# Patient Record
Sex: MALE | Race: WHITE | NOT HISPANIC OR LATINO | Employment: OTHER | URBAN - METROPOLITAN AREA
[De-identification: names, ages, dates, MRNs, and addresses within clinical notes are randomized per-mention and may not be internally consistent; named-entity substitution may affect disease eponyms.]

---

## 2017-01-05 ENCOUNTER — HOSPITAL ENCOUNTER (OUTPATIENT)
Dept: CARDIOLOGY | Facility: MEDICAL CENTER | Age: 72
End: 2017-01-05
Attending: INTERNAL MEDICINE
Payer: MEDICARE

## 2017-01-05 DIAGNOSIS — I48.4 ATYPICAL ATRIAL FLUTTER (HCC): ICD-10-CM

## 2017-01-05 LAB
LV EJECT FRACT  99904: 65
LV EJECT FRACT MOD 2C 99903: 55.7
LV EJECT FRACT MOD 4C 99902: 55.39
LV EJECT FRACT MOD BP 99901: 54.95

## 2017-01-05 PROCEDURE — 93306 TTE W/DOPPLER COMPLETE: CPT

## 2017-01-06 ENCOUNTER — TELEPHONE (OUTPATIENT)
Dept: CARDIOLOGY | Facility: MEDICAL CENTER | Age: 72
End: 2017-01-06

## 2017-01-07 NOTE — TELEPHONE ENCOUNTER
----- Message from Bere Larios M.D. sent at 1/6/2017  7:46 AM PST -----  Great heart function, only mild mitral leak - no concerns at all  F/u as planned

## 2017-02-09 ENCOUNTER — OFFICE VISIT (OUTPATIENT)
Dept: CARDIOLOGY | Facility: MEDICAL CENTER | Age: 72
End: 2017-02-09
Payer: MEDICARE

## 2017-02-09 VITALS
BODY MASS INDEX: 27.4 KG/M2 | SYSTOLIC BLOOD PRESSURE: 110 MMHG | HEART RATE: 78 BPM | HEIGHT: 69 IN | OXYGEN SATURATION: 94 % | DIASTOLIC BLOOD PRESSURE: 70 MMHG | WEIGHT: 185 LBS

## 2017-02-09 DIAGNOSIS — I48.4 ATYPICAL ATRIAL FLUTTER (HCC): ICD-10-CM

## 2017-02-09 PROCEDURE — G8420 CALC BMI NORM PARAMETERS: HCPCS | Performed by: INTERNAL MEDICINE

## 2017-02-09 PROCEDURE — 1101F PT FALLS ASSESS-DOCD LE1/YR: CPT | Mod: 8P | Performed by: INTERNAL MEDICINE

## 2017-02-09 PROCEDURE — G8482 FLU IMMUNIZE ORDER/ADMIN: HCPCS | Performed by: INTERNAL MEDICINE

## 2017-02-09 PROCEDURE — 3017F COLORECTAL CA SCREEN DOC REV: CPT | Mod: 8P | Performed by: INTERNAL MEDICINE

## 2017-02-09 PROCEDURE — 4040F PNEUMOC VAC/ADMIN/RCVD: CPT | Mod: 8P | Performed by: INTERNAL MEDICINE

## 2017-02-09 PROCEDURE — 1036F TOBACCO NON-USER: CPT | Performed by: INTERNAL MEDICINE

## 2017-02-09 PROCEDURE — 99214 OFFICE O/P EST MOD 30 MIN: CPT | Performed by: INTERNAL MEDICINE

## 2017-02-09 PROCEDURE — G8432 DEP SCR NOT DOC, RNG: HCPCS | Performed by: INTERNAL MEDICINE

## 2017-02-09 ASSESSMENT — ENCOUNTER SYMPTOMS
INSOMNIA: 0
DIZZINESS: 0
WEIGHT LOSS: 0
COUGH: 0
PALPITATIONS: 0
BRUISES/BLEEDS EASILY: 0
SHORTNESS OF BREATH: 0
GASTROINTESTINAL NEGATIVE: 1
EYES NEGATIVE: 1
LOSS OF CONSCIOUSNESS: 0
ABDOMINAL PAIN: 0

## 2017-02-09 NOTE — PROGRESS NOTES
Subjective:   Hardy Roca is a 71 y.o. male who presents today in follow-up in regards to his paroxysmal atrial flutter    Doing extremely well, golfing. No limitations and no rapid heart rates or flutters. Compliant with his medications which include aspirin    Past Medical History   Diagnosis Date   • Unspecified urinary incontinence      prostate enlargement , per patient   • CATARACT 2006, 2007     both done, one at a time   • BPH (benign prostatic hypertrophy)    • Atrial flutter (CMS-HCC)    • HLD (hyperlipidemia)      Past Surgical History   Procedure Laterality Date   • Cataract extraction with iol       bilateral   • Inguinal hernia repair  10/28/2009     Performed by ANGELI CHOI at SURGERY [facility] ORS   • Other  1982     tonsillectomy   • Hernia repair  2009   • Appendectomy     • Recovery  11/23/2015     Procedure: CATH LAB-CARDIOVERSION-KOZTOWSKI-ANESTHESIA-ICD 10: I48.91;  Surgeon: Recoveryonly Surgery;  Location: SURGERY PRE-POST PROC UNIT [facility] [place];  Service:      Family History   Problem Relation Age of Onset   • Heart Disease Neg Hx    • Heart Failure Neg Hx      History   Smoking status   • Never Smoker    Smokeless tobacco   • Never Used     No Known Allergies  Outpatient Encounter Prescriptions as of 2/9/2017   Medication Sig Dispense Refill   • desmopressin (DDAVP) 0.2 MG tablet every evening.     • terazosin (HYTRIN) 5 MG Cap every evening.     • simvastatin (ZOCOR) 20 MG Tab Take 1 Tab by mouth every evening. 30 Tab 11   • [DISCONTINUED] diltiazem (CARDIZEM) 30 MG Tab Take 1 Tab by mouth 2 Times a Day. 180 Tab 3     No facility-administered encounter medications on file as of 2/9/2017.     Review of Systems   Constitutional: Negative for weight loss and malaise/fatigue.   HENT: Negative for congestion.    Eyes: Negative.    Respiratory: Negative for cough and shortness of breath.    Cardiovascular: Negative for chest pain, palpitations and leg swelling.   Gastrointestinal: Negative.   "Negative for abdominal pain.   Genitourinary: Negative for dysuria.   Neurological: Negative for dizziness and loss of consciousness.   Endo/Heme/Allergies: Does not bruise/bleed easily.   Psychiatric/Behavioral: The patient does not have insomnia.    All other systems reviewed and are negative.       Objective:   /70 mmHg  Pulse 78  Ht 1.753 m (5' 9.02\")  Wt 83.915 kg (185 lb)  BMI 27.31 kg/m2  SpO2 94%    Physical Exam   Constitutional: He is oriented to person, place, and time. He appears well-developed.   HENT:   Mouth/Throat: Mucous membranes are normal.   Eyes: Conjunctivae and EOM are normal.   Neck: No JVD present. No thyroid mass present.   Cardiovascular: Normal rate, regular rhythm and intact distal pulses.    No murmur heard.  Pulmonary/Chest: Effort normal and breath sounds normal.   Musculoskeletal: Normal range of motion. He exhibits no edema.   Neurological: He is alert and oriented to person, place, and time. He has normal strength. He displays no tremor.   Skin: Skin is warm.   Psychiatric: He has a normal mood and affect. His behavior is normal.   Vitals reviewed.      Assessment:     1. Atypical atrial flutter (CMS-HCC)         Medical Decision Making:  Today's Assessment / Status / Plan:     Paroxysmal atrial flutter 2015. Symptomatic. Resolved    Atrial flutter 2015 at the same time due to rapid rates. We will review the echocardiogram images from last month. Normal and reassuring, mild mitral regurgitation. We will repeat in 1-2 years. Guidelines    Stent more than 20 minutes reviewing the chart and talking about his condition which is actually quite stable and in remission    Aspirin per guidelines, RTC one year  "

## 2017-02-09 NOTE — MR AVS SNAPSHOT
"        Hardy Roca   2017 7:45 AM   Office Visit   MRN: [id number]    Department:  [facility]   Dept Phone:  [phone]    Description:  Male : 1945   Provider:  Bere Larios M.D.           Reason for Visit     Follow-Up           Allergies as of 2017     No Known Allergies      You were diagnosed with     Atypical atrial flutter (CMS-HCC)   [023828]         Vital Signs     Blood Pressure Pulse Height Weight Body Mass Index Oxygen Saturation    110/70 mmHg 78 1.753 m (5' 9.02\") 83.915 kg (185 lb) 27.31 kg/m2 94%    Smoking Status                   Never Smoker            Basic Information     Date Of Birth Sex Race Ethnicity Preferred Language    1945 Male White Non- English      Problem List              ICD-10-CM Priority Class Noted - Resolved    Atrial flutter (CMS-HCC) I48.92 High  10/14/2015 - Present    Hyperlipidemia E78.5 High  10/26/2015 - Present    BPH (benign prostatic hypertrophy) N40.0 Low  10/26/2015 - Present      Health Maintenance        Date Due Completion Dates    IMM DTaP/Tdap/Td Vaccine (1 - Tdap) 11/15/1963 ---    COLONOSCOPY 11/15/1994 ---    IMM ZOSTER VACCINE 11/15/2004 ---    IMM PNEUMOCOCCAL 65+ (ADULT) LOW/MEDIUM RISK SERIES (1 of 2 - PCV13) 11/15/2009 ---            Current Immunizations     Influenza Vaccine Adult HD 10/11/2016  9:03 AM, 2015  9:22 AM      Below and/or attached are the medications your provider expects you to take. Review all of your home medications and newly ordered medications with your provider and/or pharmacist. Follow medication instructions as directed by your provider and/or pharmacist. Please keep your medication list with you and share with your provider. Update the information when medications are discontinued, doses are changed, or new medications (including over-the-counter products) are added; and carry medication information at all times in the event of emergency situations     Allergies:  No Known " Allergies          Medications  Valid as of: February 09, 2017 -  8:08 AM    Generic Name Brand Name Tablet Size Instructions for use    Desmopressin Acetate (Tab) DDAVP 0.2 MG every evening.        Simvastatin (Tab) ZOCOR 20 MG Take 1 Tab by mouth every evening.        Terazosin HCl (Cap) HYTRIN 5 MG every evening.        .                 Medicines prescribed today were sent to:     [facility] [address] [facility] [place] - [address] [facility]    [address] [facility] [place] [address]    Phone: [phone] Fax: [phone]    Open 24 Hours?: No      Medication refill instructions:       If your prescription bottle indicates you have medication refills left, it is not necessary to call your provider’s office. Please contact your pharmacy and they will refill your medication.    If your prescription bottle indicates you do not have any refills left, you may request refills at any time through one of the following ways: The online [employer] system (except Urgent Care), by calling your provider’s office, or by asking your pharmacy to contact your provider’s office with a refill request. Medication refills are processed only during regular business hours and may not be available until the next business day. Your provider may request additional information or to have a follow-up visit with you prior to refilling your medication.   *Please Note: Medication refills are assigned a new Rx number when refilled electronically. Your pharmacy may indicate that no refills were authorized even though a new prescription for the same medication is available at the pharmacy. Please request the medicine by name with the pharmacy before contacting your provider for a refill.           [employer] Access Code: [email or web address]  Expires: 3/11/2017  8:08 AM    [employer]  A secure, online tool to manage your health information     [employer]® is a secure, online tool that connects you to your personalized health information from the privacy of your  home -- day or night - making it very easy for you to manage your healthcare. Once the activation process is completed, you can even access your medical information using the [employer] pastora, which is available for free in the Apple Pastora store or Google Play store.     [employer] provides the following levels of access (as shown below):   My Chart Features   [facility] Primary [facility] Doctor [facility]  Specialists [facility]  Urgent  Care Non-[facility]  Primary Care  Doctor   Email your healthcare team securely and privately 24/7 X X X    Manage appointments: schedule your next appointment; view details of past/upcoming appointments X      Request prescription refills. X      View recent personal medical records, including lab and immunizations X X X X   View health record, including health history, allergies, medications X X X X   Read reports about your outpatient visits, procedures, consult and ER notes X X X X   See your discharge summary, which is a recap of your hospital and/or ER visit that includes your diagnosis, lab results, and care plan. X X       How to register for [employer]:  1. Go to  [email or web address].  2. Click on the Sign Up Now box, which takes you to the New Member Sign Up page. You will need to provide the following information:  a. Enter your [employer] Access Code exactly as it appears at the top of this page. (You will not need to use this code after you’ve completed the sign-up process. If you do not sign up before the expiration date, you must request a new code.)   b. Enter your date of birth.   c. Enter your home email address.   d. Click Submit, and follow the next screen’s instructions.  3. Create a [employer] ID. This will be your [employer] login ID and cannot be changed, so think of one that is secure and easy to remember.  4. Create a [employer] password. You can change your password at any time.  5. Enter your Password Reset Question and Answer. This can be used at a later time if you forget your password.    6. Enter your e-mail address. This allows you to receive e-mail notifications when new information is available in [employer].  7. Click Sign Up. You can now view your health information.    For assistance activating your [employer] account, call [phone]

## 2017-02-09 NOTE — Clinical Note
[facility]   [address]  [place] [address]  Phone: [phone]  Fax: [phone]              Hardy Roca  1945    Encounter Date: 2/9/2017    Bere Larios M.D.          PROGRESS NOTE:  Subjective:   Hardy Roca is a 71 y.o. male who presents today in follow-up in regards to his paroxysmal atrial flutter    Doing extremely well, golfing. No limitations and no rapid heart rates or flutters. Compliant with his medications which include aspirin    Past Medical History   Diagnosis Date   • Unspecified urinary incontinence      prostate enlargement , per patient   • CATARACT 2006, 2007     both done, one at a time   • BPH (benign prostatic hypertrophy)    • Atrial flutter (CMS-HCC)    • HLD (hyperlipidemia)      Past Surgical History   Procedure Laterality Date   • Cataract extraction with iol       bilateral   • Inguinal hernia repair  10/28/2009     Performed by ANGELI CHOI at SURGERY [facility] ORS   • Other  1982     tonsillectomy   • Hernia repair  2009   • Appendectomy     • Recovery  11/23/2015     Procedure: CATH LAB-CARDIOVERSION-KOZTOWSKI-ANESTHESIA-ICD 10: I48.91;  Surgeon: Recoveryonly Surgery;  [place] [facility] [place]  Service:      Family History   Problem Relation Age of Onset   • Heart Disease Neg Hx    • Heart Failure Neg Hx      History   Smoking status   • Never Smoker    Smokeless tobacco   • Never Used     No Known Allergies  Outpatient Encounter Prescriptions as of 2/9/2017   Medication Sig Dispense Refill   • desmopressin (DDAVP) 0.2 MG tablet every evening.     • terazosin (HYTRIN) 5 MG Cap every evening.     • simvastatin (ZOCOR) 20 MG Tab Take 1 Tab by mouth every evening. 30 Tab 11   • [DISCONTINUED] diltiazem (CARDIZEM) 30 MG Tab Take 1 Tab by mouth 2 Times a Day. 180 Tab 3     No facility-administered encounter medications on file as of 2/9/2017.     Review of Systems   Constitutional:  "Negative for weight loss and malaise/fatigue.   HENT: Negative for congestion.    Eyes: Negative.    Respiratory: Negative for cough and shortness of breath.    Cardiovascular: Negative for chest pain, palpitations and leg swelling.   Gastrointestinal: Negative.  Negative for abdominal pain.   Genitourinary: Negative for dysuria.   Neurological: Negative for dizziness and loss of consciousness.   Endo/Heme/Allergies: Does not bruise/bleed easily.   Psychiatric/Behavioral: The patient does not have insomnia.    All other systems reviewed and are negative.       Objective:   /70 mmHg  Pulse 78  Ht 1.753 m (5' 9.02\")  Wt 83.915 kg (185 lb)  BMI 27.31 kg/m2  SpO2 94%    Physical Exam   Constitutional: He is oriented to person, place, and time. He appears well-developed.   HENT:   Mouth/Throat: Mucous membranes are normal.   Eyes: Conjunctivae and EOM are normal.   Neck: No JVD present. No thyroid mass present.   Cardiovascular: Normal rate, regular rhythm and intact distal pulses.    No murmur heard.  Pulmonary/Chest: Effort normal and breath sounds normal.   Musculoskeletal: Normal range of motion. He exhibits no edema.   Neurological: He is alert and oriented to person, place, and time. He has normal strength. He displays no tremor.   Skin: Skin is warm.   Psychiatric: He has a normal mood and affect. His behavior is normal.   Vitals reviewed.      Assessment:     1. Atypical atrial flutter (CMS-[facility])         Medical Decision Making:  Today's Assessment / Status / Plan:     Paroxysmal atrial flutter 2015. Symptomatic. Resolved    Atrial flutter 2015 at the same time due to rapid rates. We will review the echocardiogram images from last month. Normal and reassuring, mild mitral regurgitation. We will repeat in 1-2 years. Guidelines    Stent more than 20 minutes reviewing the chart and talking about his condition which is actually quite stable and in remission    Aspirin per guidelines, RTC one year      Long " ARLEY Eddy D.O.  [address] [address]  VIA Facsimile: [phone]

## 2017-05-23 ENCOUNTER — HOSPITAL ENCOUNTER (OUTPATIENT)
Dept: LAB | Facility: MEDICAL CENTER | Age: 72
End: 2017-05-23
Attending: FAMILY MEDICINE
Payer: MEDICARE

## 2017-05-23 LAB
ALBUMIN SERPL BCP-MCNC: 4.1 G/DL (ref 3.2–4.9)
ALBUMIN/GLOB SERPL: 1.7 G/DL
ALP SERPL-CCNC: 30 U/L (ref 30–99)
ALT SERPL-CCNC: 12 U/L (ref 2–50)
ANION GAP SERPL CALC-SCNC: 7 MMOL/L (ref 0–11.9)
AST SERPL-CCNC: 16 U/L (ref 12–45)
BILIRUB SERPL-MCNC: 1.2 MG/DL (ref 0.1–1.5)
BUN SERPL-MCNC: 24 MG/DL (ref 8–22)
CALCIUM SERPL-MCNC: 9.1 MG/DL (ref 8.5–10.5)
CHLORIDE SERPL-SCNC: 107 MMOL/L (ref 96–112)
CHOLEST SERPL-MCNC: 106 MG/DL (ref 100–199)
CO2 SERPL-SCNC: 24 MMOL/L (ref 20–33)
CREAT SERPL-MCNC: 1.14 MG/DL (ref 0.5–1.4)
GFR SERPL CREATININE-BSD FRML MDRD: >60 ML/MIN/1.73 M 2
GLOBULIN SER CALC-MCNC: 2.4 G/DL (ref 1.9–3.5)
GLUCOSE SERPL-MCNC: 92 MG/DL (ref 65–99)
HDLC SERPL-MCNC: 34 MG/DL
LDLC SERPL CALC-MCNC: 51 MG/DL
POTASSIUM SERPL-SCNC: 4.1 MMOL/L (ref 3.6–5.5)
PROT SERPL-MCNC: 6.5 G/DL (ref 6–8.2)
SODIUM SERPL-SCNC: 138 MMOL/L (ref 135–145)
TRIGL SERPL-MCNC: 106 MG/DL (ref 0–149)

## 2017-05-23 PROCEDURE — 36415 COLL VENOUS BLD VENIPUNCTURE: CPT

## 2017-05-23 PROCEDURE — 80061 LIPID PANEL: CPT

## 2017-05-23 PROCEDURE — 80053 COMPREHEN METABOLIC PANEL: CPT

## 2017-11-14 ENCOUNTER — APPOINTMENT (OUTPATIENT)
Dept: SOCIAL WORK | Facility: CLINIC | Age: 72
End: 2017-11-14
Payer: MEDICARE

## 2017-11-14 PROCEDURE — G0008 ADMIN INFLUENZA VIRUS VAC: HCPCS | Performed by: REGISTERED NURSE

## 2017-11-14 PROCEDURE — 90662 IIV NO PRSV INCREASED AG IM: CPT | Performed by: REGISTERED NURSE

## 2017-11-21 ENCOUNTER — HOSPITAL ENCOUNTER (OUTPATIENT)
Dept: LAB | Facility: MEDICAL CENTER | Age: 72
End: 2017-11-21
Attending: FAMILY MEDICINE
Payer: MEDICARE

## 2017-11-21 LAB
ALBUMIN SERPL BCP-MCNC: 4 G/DL (ref 3.2–4.9)
ALBUMIN/GLOB SERPL: 1.5 G/DL
ALP SERPL-CCNC: 31 U/L (ref 30–99)
ALT SERPL-CCNC: 15 U/L (ref 2–50)
ANION GAP SERPL CALC-SCNC: 6 MMOL/L (ref 0–11.9)
AST SERPL-CCNC: 18 U/L (ref 12–45)
BILIRUB SERPL-MCNC: 1.1 MG/DL (ref 0.1–1.5)
BUN SERPL-MCNC: 24 MG/DL (ref 8–22)
CALCIUM SERPL-MCNC: 9.4 MG/DL (ref 8.5–10.5)
CHLORIDE SERPL-SCNC: 109 MMOL/L (ref 96–112)
CHOLEST SERPL-MCNC: 110 MG/DL (ref 100–199)
CO2 SERPL-SCNC: 25 MMOL/L (ref 20–33)
CREAT SERPL-MCNC: 1.18 MG/DL (ref 0.5–1.4)
GFR SERPL CREATININE-BSD FRML MDRD: >60 ML/MIN/1.73 M 2
GLOBULIN SER CALC-MCNC: 2.6 G/DL (ref 1.9–3.5)
GLUCOSE SERPL-MCNC: 92 MG/DL (ref 65–99)
HDLC SERPL-MCNC: 32 MG/DL
LDLC SERPL CALC-MCNC: 55 MG/DL
POTASSIUM SERPL-SCNC: 4.1 MMOL/L (ref 3.6–5.5)
PROT SERPL-MCNC: 6.6 G/DL (ref 6–8.2)
SODIUM SERPL-SCNC: 140 MMOL/L (ref 135–145)
TRIGL SERPL-MCNC: 114 MG/DL (ref 0–149)

## 2017-11-21 PROCEDURE — 36415 COLL VENOUS BLD VENIPUNCTURE: CPT

## 2017-11-21 PROCEDURE — 80061 LIPID PANEL: CPT

## 2017-11-21 PROCEDURE — 80053 COMPREHEN METABOLIC PANEL: CPT

## 2018-02-05 ENCOUNTER — HOSPITAL ENCOUNTER (OUTPATIENT)
Dept: LAB | Facility: MEDICAL CENTER | Age: 73
End: 2018-02-05
Attending: UROLOGY
Payer: MEDICARE

## 2018-02-05 LAB — PSA SERPL-MCNC: 7.38 NG/ML (ref 0–4)

## 2018-02-05 PROCEDURE — 84153 ASSAY OF PSA TOTAL: CPT

## 2018-02-05 PROCEDURE — 36415 COLL VENOUS BLD VENIPUNCTURE: CPT

## 2018-02-14 ENCOUNTER — OFFICE VISIT (OUTPATIENT)
Dept: CARDIOLOGY | Facility: MEDICAL CENTER | Age: 73
End: 2018-02-14
Payer: MEDICARE

## 2018-02-14 VITALS
OXYGEN SATURATION: 93 % | SYSTOLIC BLOOD PRESSURE: 110 MMHG | HEART RATE: 94 BPM | HEIGHT: 69 IN | BODY MASS INDEX: 26.66 KG/M2 | DIASTOLIC BLOOD PRESSURE: 70 MMHG | WEIGHT: 180 LBS

## 2018-02-14 DIAGNOSIS — I48.4 ATYPICAL ATRIAL FLUTTER (HCC): ICD-10-CM

## 2018-02-14 LAB — EKG IMPRESSION: NORMAL

## 2018-02-14 PROCEDURE — 99214 OFFICE O/P EST MOD 30 MIN: CPT | Performed by: INTERNAL MEDICINE

## 2018-02-14 PROCEDURE — 93000 ELECTROCARDIOGRAM COMPLETE: CPT | Performed by: INTERNAL MEDICINE

## 2018-02-14 ASSESSMENT — ENCOUNTER SYMPTOMS
COUGH: 0
GASTROINTESTINAL NEGATIVE: 1
SHORTNESS OF BREATH: 0
BRUISES/BLEEDS EASILY: 0
WEIGHT LOSS: 0
INSOMNIA: 0
DEPRESSION: 0
LOSS OF CONSCIOUSNESS: 0
PALPITATIONS: 0
ABDOMINAL PAIN: 0
EYES NEGATIVE: 1
DIZZINESS: 0

## 2018-02-14 NOTE — PROGRESS NOTES
Subjective:   Hardy Roca is a 72 y.o. male who presents today in follow-up in regards to his paroxysmal atrial flutter    Doing extremely well, golfing.   No limitations and no rapid heart rates or flutters.   Compliant with his medications which include aspirin - hasn't got sick, can walk 3-4 flights of stairs or a mile on the golf course without limiting symptoms    Past Medical History:   Diagnosis Date   • Atrial flutter (CMS-HCC)    • BPH (benign prostatic hypertrophy)    • CATARACT 2006, 2007    both done, one at a time   • HLD (hyperlipidemia)    • Unspecified urinary incontinence     prostate enlargement , per patient     Past Surgical History:   Procedure Laterality Date   • RECOVERY  11/23/2015    Procedure: CATH LAB-CARDIOVERSION-KOZTOWSKI-ANESTHESIA-ICD 10: I48.91;  Surgeon: Recoveryonly Surgery;  Location: SURGERY PRE-POST PROC UNIT [facility] [place];  Service:    • INGUINAL HERNIA REPAIR  10/28/2009    Performed by ANGELI CHOI at SURGERY [facility]   • HERNIA REPAIR  2009   • OTHER  1982    tonsillectomy   • APPENDECTOMY     • CATARACT EXTRACTION WITH IOL      bilateral     Family History   Problem Relation Age of Onset   • Heart Disease Neg Hx    • Heart Failure Neg Hx      History   Smoking Status   • Never Smoker   Smokeless Tobacco   • Never Used     No Known Allergies  Outpatient Encounter Prescriptions as of 2/14/2018   Medication Sig Dispense Refill   • aspirin EC (ECOTRIN) 81 MG Tablet Delayed Response Take 81 mg by mouth every day.     • desmopressin (DDAVP) 0.2 MG tablet every evening.     • terazosin (HYTRIN) 5 MG Cap every evening.     • simvastatin (ZOCOR) 20 MG Tab Take 1 Tab by mouth every evening. 30 Tab 11     No facility-administered encounter medications on file as of 2/14/2018.      Review of Systems   Constitutional: Negative for malaise/fatigue and weight loss.   HENT: Negative for congestion.    Eyes: Negative.    Respiratory: Negative for cough and shortness of breath.   "  Cardiovascular: Negative for chest pain, palpitations and leg swelling.   Gastrointestinal: Negative.  Negative for abdominal pain.   Genitourinary: Negative for dysuria.   Neurological: Negative for dizziness and loss of consciousness.   Endo/Heme/Allergies: Does not bruise/bleed easily.   Psychiatric/Behavioral: Negative for depression. The patient does not have insomnia.    All other systems reviewed and are negative.       Objective:   /70   Pulse 94   Ht 1.753 m (5' 9\")   Wt 81.6 kg (180 lb)   SpO2 93%   BMI 26.58 kg/m²     Physical Exam   Constitutional: He is oriented to person, place, and time. He appears well-developed and well-nourished.   HENT:   Head: Normocephalic and atraumatic.   Mouth/Throat: Mucous membranes are normal.   Eyes: EOM are normal. Pupils are equal, round, and reactive to light. No scleral icterus.   Neck: No JVD present. No thyroid mass and no thyromegaly present.   Cardiovascular: Normal rate, regular rhythm and intact distal pulses.    No murmur heard.  Pulmonary/Chest: Effort normal and breath sounds normal. He exhibits no tenderness.   Abdominal: Bowel sounds are normal.   Musculoskeletal: Normal range of motion. He exhibits no edema.   Neurological: He is alert and oriented to person, place, and time. He has normal strength. He displays no tremor. He exhibits normal muscle tone. Coordination normal.   Skin: Skin is warm. No rash noted.   Psychiatric: He has a normal mood and affect. His behavior is normal.   Vitals reviewed.      Assessment:     1. Atypical atrial flutter (CMS-[facility])  EKG       Medical Decision Making:  Today's Assessment / Status / Plan:     Paroxysmal atrial flutter 2015. Symptomatic. Resolved  Repeat EKG now  Aspirin   Echo last January 2017 normal outside mild mitral regurgitation, discussed. Repeat next year    Mitral regurgitation  Stable on last 2 echoes  Discussed physiology, discussed blood pressure and exercise  Echo as above, " reassurance    Reviewed labs, lipids excellent. He'll discuss lowering his statin with his PCP. LDL should be less than 100 and it is 55    RTC one year, questions answered  \

## 2018-02-14 NOTE — LETTER
[facility]   [address]  [place] [address]  Phone: [phone]  Fax: [phone]              Hardy Roca  1945    Encounter Date: 2/14/2018    Bere Larios M.D.          PROGRESS NOTE:  Subjective:   Hardy Roca is a 72 y.o. male who presents today in follow-up in regards to his paroxysmal atrial flutter    Doing extremely well, golfing.   No limitations and no rapid heart rates or flutters.   Compliant with his medications which include aspirin - hasn't got sick, can walk 3-4 flights of stairs or a mile on the golf course without limiting symptoms    Past Medical History:   Diagnosis Date   • Atrial flutter (CMS-HCC)    • BPH (benign prostatic hypertrophy)    • CATARACT 2006, 2007    both done, one at a time   • HLD (hyperlipidemia)    • Unspecified urinary incontinence     prostate enlargement , per patient     Past Surgical History:   Procedure Laterality Date   • RECOVERY  11/23/2015    Procedure: CATH LAB-CARDIOVERSION-KOZTOWSKI-ANESTHESIA-ICD 10: I48.91;  Surgeon: Recoveryonly Surgery;  [place] [facility] [place]  Service:    • INGUINAL HERNIA REPAIR  10/28/2009    Performed by ANGELI CHOI at SURGERY [facility] ORS   • HERNIA REPAIR  2009   • OTHER  1982    tonsillectomy   • APPENDECTOMY     • CATARACT EXTRACTION WITH IOL      bilateral     Family History   Problem Relation Age of Onset   • Heart Disease Neg Hx    • Heart Failure Neg Hx      History   Smoking Status   • Never Smoker   Smokeless Tobacco   • Never Used     No Known Allergies  Outpatient Encounter Prescriptions as of 2/14/2018   Medication Sig Dispense Refill   • aspirin EC (ECOTRIN) 81 MG Tablet Delayed Response Take 81 mg by mouth every day.     • desmopressin (DDAVP) 0.2 MG tablet every evening.     • terazosin (HYTRIN) 5 MG Cap every evening.     • simvastatin (ZOCOR) 20 MG Tab Take 1 Tab by mouth every evening. 30 Tab 11     No  "facility-administered encounter medications on file as of 2/14/2018.      Review of Systems   Constitutional: Negative for malaise/fatigue and weight loss.   HENT: Negative for congestion.    Eyes: Negative.    Respiratory: Negative for cough and shortness of breath.    Cardiovascular: Negative for chest pain, palpitations and leg swelling.   Gastrointestinal: Negative.  Negative for abdominal pain.   Genitourinary: Negative for dysuria.   Neurological: Negative for dizziness and loss of consciousness.   Endo/Heme/Allergies: Does not bruise/bleed easily.   Psychiatric/Behavioral: Negative for depression. The patient does not have insomnia.    All other systems reviewed and are negative.       Objective:   /70   Pulse 94   Ht 1.753 m (5' 9\")   Wt 81.6 kg (180 lb)   SpO2 93%   BMI 26.58 kg/m²      Physical Exam   Constitutional: He is oriented to person, place, and time. He appears well-developed and well-nourished.   HENT:   Head: Normocephalic and atraumatic.   Mouth/Throat: Mucous membranes are normal.   Eyes: EOM are normal. Pupils are equal, round, and reactive to light. No scleral icterus.   Neck: No JVD present. No thyroid mass and no thyromegaly present.   Cardiovascular: Normal rate, regular rhythm and intact distal pulses.    No murmur heard.  Pulmonary/Chest: Effort normal and breath sounds normal. He exhibits no tenderness.   Abdominal: Bowel sounds are normal.   Musculoskeletal: Normal range of motion. He exhibits no edema.   Neurological: He is alert and oriented to person, place, and time. He has normal strength. He displays no tremor. He exhibits normal muscle tone. Coordination normal.   Skin: Skin is warm. No rash noted.   Psychiatric: He has a normal mood and affect. His behavior is normal.   Vitals reviewed.      Assessment:     1. Atypical atrial flutter (CMS-[facility])  EKG       Medical Decision Making:  Today's Assessment / Status / Plan:     Paroxysmal atrial flutter 2015. Symptomatic. " Resolved  Repeat EKG now  Aspirin   Echo last January 2017 normal outside mild mitral regurgitation, discussed. Repeat next year    Mitral regurgitation  Stable on last 2 echoes  Discussed physiology, discussed blood pressure and exercise  Echo as above, reassurance    Reviewed labs, lipids excellent. He'll discuss lowering his statin with his PCP. LDL should be less than 100 and it is 55    RTC one year, questions answered  \      Long Eddy D.O.  [address] [address]  VIA Facsimile: [phone]

## 2018-06-07 ENCOUNTER — HOSPITAL ENCOUNTER (OUTPATIENT)
Dept: LAB | Facility: MEDICAL CENTER | Age: 73
End: 2018-06-07
Attending: FAMILY MEDICINE
Payer: MEDICARE

## 2018-06-07 LAB
ALBUMIN SERPL BCP-MCNC: 4.3 G/DL (ref 3.2–4.9)
ALBUMIN/GLOB SERPL: 1.7 G/DL
ALP SERPL-CCNC: 32 U/L (ref 30–99)
ALT SERPL-CCNC: 14 U/L (ref 2–50)
ANION GAP SERPL CALC-SCNC: 9 MMOL/L (ref 0–11.9)
AST SERPL-CCNC: 15 U/L (ref 12–45)
BILIRUB SERPL-MCNC: 1.1 MG/DL (ref 0.1–1.5)
BUN SERPL-MCNC: 21 MG/DL (ref 8–22)
CALCIUM SERPL-MCNC: 9.4 MG/DL (ref 8.5–10.5)
CHLORIDE SERPL-SCNC: 108 MMOL/L (ref 96–112)
CHOLEST SERPL-MCNC: 111 MG/DL (ref 100–199)
CO2 SERPL-SCNC: 24 MMOL/L (ref 20–33)
CREAT SERPL-MCNC: 1.15 MG/DL (ref 0.5–1.4)
GLOBULIN SER CALC-MCNC: 2.5 G/DL (ref 1.9–3.5)
GLUCOSE SERPL-MCNC: 100 MG/DL (ref 65–99)
HDLC SERPL-MCNC: 35 MG/DL
LDLC SERPL CALC-MCNC: 59 MG/DL
POTASSIUM SERPL-SCNC: 4.3 MMOL/L (ref 3.6–5.5)
PROT SERPL-MCNC: 6.8 G/DL (ref 6–8.2)
SODIUM SERPL-SCNC: 141 MMOL/L (ref 135–145)
TRIGL SERPL-MCNC: 87 MG/DL (ref 0–149)

## 2018-06-07 PROCEDURE — 36415 COLL VENOUS BLD VENIPUNCTURE: CPT

## 2018-06-07 PROCEDURE — 80061 LIPID PANEL: CPT

## 2018-06-07 PROCEDURE — 80053 COMPREHEN METABOLIC PANEL: CPT

## 2018-11-15 ENCOUNTER — IMMUNIZATION (OUTPATIENT)
Dept: SOCIAL WORK | Facility: CLINIC | Age: 73
End: 2018-11-15
Payer: MEDICARE

## 2018-11-15 DIAGNOSIS — Z23 NEED FOR VACCINATION: ICD-10-CM

## 2018-11-15 PROCEDURE — G0008 ADMIN INFLUENZA VIRUS VAC: HCPCS | Performed by: REGISTERED NURSE

## 2018-11-15 PROCEDURE — 90662 IIV NO PRSV INCREASED AG IM: CPT | Performed by: REGISTERED NURSE

## 2018-12-04 ENCOUNTER — HOSPITAL ENCOUNTER (OUTPATIENT)
Dept: LAB | Facility: MEDICAL CENTER | Age: 73
End: 2018-12-04
Attending: FAMILY MEDICINE
Payer: MEDICARE

## 2018-12-04 LAB
ALBUMIN SERPL BCP-MCNC: 4.2 G/DL (ref 3.2–4.9)
ALBUMIN/GLOB SERPL: 1.7 G/DL
ALP SERPL-CCNC: 34 U/L (ref 30–99)
ALT SERPL-CCNC: 14 U/L (ref 2–50)
ANION GAP SERPL CALC-SCNC: 7 MMOL/L (ref 0–11.9)
AST SERPL-CCNC: 15 U/L (ref 12–45)
BILIRUB SERPL-MCNC: 1 MG/DL (ref 0.1–1.5)
BUN SERPL-MCNC: 25 MG/DL (ref 8–22)
CALCIUM SERPL-MCNC: 9.4 MG/DL (ref 8.5–10.5)
CHLORIDE SERPL-SCNC: 107 MMOL/L (ref 96–112)
CHOLEST SERPL-MCNC: 120 MG/DL (ref 100–199)
CO2 SERPL-SCNC: 26 MMOL/L (ref 20–33)
CREAT SERPL-MCNC: 1.35 MG/DL (ref 0.5–1.4)
FASTING STATUS PATIENT QL REPORTED: NORMAL
GLOBULIN SER CALC-MCNC: 2.5 G/DL (ref 1.9–3.5)
GLUCOSE SERPL-MCNC: 90 MG/DL (ref 65–99)
HDLC SERPL-MCNC: 33 MG/DL
LDLC SERPL CALC-MCNC: 63 MG/DL
POTASSIUM SERPL-SCNC: 4.1 MMOL/L (ref 3.6–5.5)
PROT SERPL-MCNC: 6.7 G/DL (ref 6–8.2)
SODIUM SERPL-SCNC: 140 MMOL/L (ref 135–145)
TRIGL SERPL-MCNC: 119 MG/DL (ref 0–149)

## 2018-12-04 PROCEDURE — 80053 COMPREHEN METABOLIC PANEL: CPT

## 2018-12-04 PROCEDURE — 80061 LIPID PANEL: CPT

## 2018-12-04 PROCEDURE — 36415 COLL VENOUS BLD VENIPUNCTURE: CPT

## 2019-02-13 ENCOUNTER — OFFICE VISIT (OUTPATIENT)
Dept: CARDIOLOGY | Facility: MEDICAL CENTER | Age: 74
End: 2019-02-13
Payer: MEDICARE

## 2019-02-13 VITALS
DIASTOLIC BLOOD PRESSURE: 80 MMHG | HEIGHT: 69 IN | OXYGEN SATURATION: 95 % | SYSTOLIC BLOOD PRESSURE: 118 MMHG | HEART RATE: 88 BPM | WEIGHT: 180.89 LBS | BODY MASS INDEX: 26.79 KG/M2

## 2019-02-13 DIAGNOSIS — E78.2 MIXED HYPERLIPIDEMIA: ICD-10-CM

## 2019-02-13 DIAGNOSIS — I48.4 ATYPICAL ATRIAL FLUTTER (HCC): ICD-10-CM

## 2019-02-13 DIAGNOSIS — I34.0 MILD MITRAL REGURGITATION: ICD-10-CM

## 2019-02-13 PROCEDURE — 99214 OFFICE O/P EST MOD 30 MIN: CPT | Performed by: NURSE PRACTITIONER

## 2019-02-13 ASSESSMENT — ENCOUNTER SYMPTOMS
CLAUDICATION: 0
FEVER: 0
LOSS OF CONSCIOUSNESS: 0
SHORTNESS OF BREATH: 0
COUGH: 0
DIZZINESS: 0
WEIGHT LOSS: 0
NAUSEA: 0
CHILLS: 0
ORTHOPNEA: 0
WEAKNESS: 0
BLOOD IN STOOL: 0
PALPITATIONS: 0

## 2019-02-13 NOTE — PROGRESS NOTES
Chief Complaint   Patient presents with   • Atrial Fibrillation     follow up       Subjective:   Hardy Roca is a 73 y.o. male with history of paroxysmal atrial flutter s/p successful DCCV 2015, mild mitral regurgitation, HLD who presents today for annual follow up.      Last seen 1 year ago by Dr. KORINA Larios, he was stable, doing well.    Over the last year he has been well. No changes to his health. He is more active in the summer, golfing, although not walking the course. He has started using a stationary bike at home every other day.     Denies chest pain/pressure, palpitations, shortness of breath, dizziness, swelling.     Past Medical History:   Diagnosis Date   • Atrial flutter (HCC)    • BPH (benign prostatic hypertrophy)    • CATARACT 2006, 2007    both done, one at a time   • HLD (hyperlipidemia)    • Unspecified urinary incontinence     prostate enlargement , per patient     Past Surgical History:   Procedure Laterality Date   • RECOVERY  11/23/2015    Procedure: CATH LAB-CARDIOVERSION-KOZTOWSKI-ANESTHESIA-ICD 10: I48.91;  Surgeon: Recoveryonly Surgery;  Location: SURGERY PRE-POST PROC UNIT [place];  Service:    • INGUINAL HERNIA REPAIR  10/28/2009    Performed by ANGELI CHOI at SURGERY [facility]   • HERNIA REPAIR  2009   • OTHER  1982    tonsillectomy   • APPENDECTOMY     • CATARACT EXTRACTION WITH IOL      bilateral     Family History   Problem Relation Age of Onset   • Heart Disease Neg Hx    • Heart Failure Neg Hx      Social History     Social History   • Marital status:      Spouse name: N/A   • Number of children: N/A   • Years of education: N/A     Occupational History   • Not on file.     Social History Main Topics   • Smoking status: Never Smoker   • Smokeless tobacco: Never Used   • Alcohol use Yes      Comment: 2 per week   • Drug use: No   • Sexual activity: Not on file     Other Topics Concern   • Not on file     Social History Narrative   • No narrative on file     No  "Known Allergies  Outpatient Encounter Prescriptions as of 2/13/2019   Medication Sig Dispense Refill   • Multiple Vitamins-Minerals (OCUVITE PO) Take  by mouth.     • aspirin EC (ECOTRIN) 81 MG Tablet Delayed Response Take 81 mg by mouth every day.     • desmopressin (DDAVP) 0.2 MG tablet every evening.     • terazosin (HYTRIN) 5 MG Cap every evening.     • simvastatin (ZOCOR) 20 MG Tab Take 1 Tab by mouth every evening. 30 Tab 11     No facility-administered encounter medications on file as of 2/13/2019.      Review of Systems   Constitutional: Negative for chills, fever, malaise/fatigue and weight loss.   HENT: Negative for nosebleeds.    Respiratory: Negative for cough and shortness of breath.    Cardiovascular: Negative for chest pain, palpitations, orthopnea, claudication and leg swelling.   Gastrointestinal: Negative for blood in stool, melena and nausea.   Genitourinary:        BPH   Neurological: Negative for dizziness, loss of consciousness and weakness.        Objective:   /80 (BP Location: Left arm, Patient Position: Sitting, BP Cuff Size: Adult)   Pulse 88   Ht 1.753 m (5' 9\")   Wt 82.1 kg (180 lb 14.2 oz)   SpO2 95%   BMI 26.71 kg/m²     Physical Exam   Constitutional: He is oriented to person, place, and time. He appears well-developed and well-nourished. No distress.   HENT:   Head: Normocephalic and atraumatic.   Eyes: Conjunctivae are normal. No scleral icterus.   Neck: No JVD present.   Cardiovascular: Normal rate, regular rhythm and intact distal pulses.    Murmur heard.   Systolic murmur is present with a grade of 3/6   Pulses:       Radial pulses are 2+ on the right side, and 2+ on the left side.        Posterior tibial pulses are 2+ on the right side, and 2+ on the left side.   Pulmonary/Chest: Effort normal and breath sounds normal. No respiratory distress. He has no wheezes. He has no rales.   Abdominal: Soft. Bowel sounds are normal. He exhibits no distension. There is no " tenderness.   Musculoskeletal: He exhibits no edema.   Neurological: He is alert and oriented to person, place, and time.   Skin: Skin is warm and dry. He is not diaphoretic.   Psychiatric: Judgment normal.       Transthoracic Echo 1/5/2017  CONCLUSIONS  Left ventricular ejection fraction is visually estimated to be 65%.  Grade I diastolic dysfunction.  Normal inferior vena cava size and inspiratory collapse.  Structurally normal mitral valve without significant stenosis.   Mild mitral regurgitation. ERO by PISA method is 0.1  sq cm.  Mildly dilated left atrium. Left atrial volume index is 38  mL/sq m.  Estimated right ventricular systolic pressure  is 30 mmHg.      Lab Results   Component Value Date/Time    CHOLSTRLTOT 120 12/04/2018 07:14 AM    LDL 63 12/04/2018 07:14 AM    HDL 33 (A) 12/04/2018 07:14 AM    TRIGLYCERIDE 119 12/04/2018 07:14 AM       Lab Results   Component Value Date/Time    SODIUM 140 12/04/2018 07:14 AM    POTASSIUM 4.1 12/04/2018 07:14 AM    CHLORIDE 107 12/04/2018 07:14 AM    CO2 26 12/04/2018 07:14 AM    GLUCOSE 90 12/04/2018 07:14 AM    BUN 25 (H) 12/04/2018 07:14 AM    CREATININE 1.35 12/04/2018 07:14 AM     Lab Results   Component Value Date/Time    ALKPHOSPHAT 34 12/04/2018 07:14 AM    ASTSGOT 15 12/04/2018 07:14 AM    ALTSGPT 14 12/04/2018 07:14 AM    TBILIRUBIN 1.0 12/04/2018 07:14 AM        Assessment:     1. Atypical atrial flutter (HCC)     2. Mixed hyperlipidemia     3. Mild mitral regurgitation  EC-ECHOCARDIOGRAM COMPLETE W/O CONT       Medical Decision Making:  Today's Assessment / Status / Plan:   1. Atrial flutter, paroxysmal s/p successful DCCV  - CHADSVASc 1  - regular rhythm on exam today  - continue aspirin 81mg    2. Dyslipidemia  - continue simvastatin  - LDL 63 Dec 2018  - HDL 33  - reviewed AHA exercise recommendations of 150min/week of moderate-intensity exercise    3. Mild mitral regurg on echo 2017  -repeat echo in 1 year, follow up with Dr. KORINA Larios  afterwards    Collaborating MD: Dr. Raymond

## 2019-02-13 NOTE — LETTER
[facility]   [address], [address]  [place] [address]  Phone: [phone]  Fax: [phone]              Hardy Roca  1945    Encounter Date: 2/13/2019    Aziza Larios PA-C          PROGRESS NOTE:  Chief Complaint   Patient presents with   • Atrial Fibrillation     follow up       Subjective:   Hardy Roca is a 73 y.o. male with history of paroxysmal atrial flutter s/p successful DCCV 2015, mild mitral regurgitation, HLD who presents today for annual follow up.      Last seen 1 year ago by Dr. KORINA Larios, he was stable, doing well.    Over the last year he has been well. No changes to his health. He is more active in the summer, golfing, although not walking the course. He has started using a stationary bike at home every other day.     Denies chest pain/pressure, palpitations, shortness of breath, dizziness, swelling.     Past Medical History:   Diagnosis Date   • Atrial flutter (HCC)    • BPH (benign prostatic hypertrophy)    • CATARACT 2006, 2007    both done, one at a time   • HLD (hyperlipidemia)    • Unspecified urinary incontinence     prostate enlargement , per patient     Past Surgical History:   Procedure Laterality Date   • RECOVERY  11/23/2015    Procedure: CATH LAB-CARDIOVERSION-KOZTOWSKI-ANESTHESIA-ICD 10: I48.91;  Surgeon: Recoveryonly Surgery;  Location: SURGERY PRE-POST PROC UNIT [facility] [place];  Service:    • INGUINAL HERNIA REPAIR  10/28/2009    Performed by ANGELI CHOI at SURGERY [facility]   • HERNIA REPAIR  2009   • OTHER  1982    tonsillectomy   • APPENDECTOMY     • CATARACT EXTRACTION WITH IOL      bilateral     Family History   Problem Relation Age of Onset   • Heart Disease Neg Hx    • Heart Failure Neg Hx      Social History     Social History   • Marital status:      Spouse name: N/A   • Number of children: N/A   • Years of education: N/A     Occupational History   • Not on file.     Social History Main Topics    "  • Smoking status: Never Smoker   • Smokeless tobacco: Never Used   • Alcohol use Yes      Comment: 2 per week   • Drug use: No   • Sexual activity: Not on file     Other Topics Concern   • Not on file     Social History Narrative   • No narrative on file     No Known Allergies  Outpatient Encounter Prescriptions as of 2/13/2019   Medication Sig Dispense Refill   • Multiple Vitamins-Minerals (OCUVITE PO) Take  by mouth.     • aspirin EC (ECOTRIN) 81 MG Tablet Delayed Response Take 81 mg by mouth every day.     • desmopressin (DDAVP) 0.2 MG tablet every evening.     • terazosin (HYTRIN) 5 MG Cap every evening.     • simvastatin (ZOCOR) 20 MG Tab Take 1 Tab by mouth every evening. 30 Tab 11     No facility-administered encounter medications on file as of 2/13/2019.      Review of Systems   Constitutional: Negative for chills, fever, malaise/fatigue and weight loss.   HENT: Negative for nosebleeds.    Respiratory: Negative for cough and shortness of breath.    Cardiovascular: Negative for chest pain, palpitations, orthopnea, claudication and leg swelling.   Gastrointestinal: Negative for blood in stool, melena and nausea.   Genitourinary:        BPH   Neurological: Negative for dizziness, loss of consciousness and weakness.        Objective:   /80 (BP Location: Left arm, Patient Position: Sitting, BP Cuff Size: Adult)   Pulse 88   Ht 1.753 m (5' 9\")   Wt 82.1 kg (180 lb 14.2 oz)   SpO2 95%   BMI 26.71 kg/m²      Physical Exam   Constitutional: He is oriented to person, place, and time. He appears well-developed and well-nourished. No distress.   HENT:   Head: Normocephalic and atraumatic.   Eyes: Conjunctivae are normal. No scleral icterus.   Neck: No JVD present.   Cardiovascular: Normal rate, regular rhythm and intact distal pulses.    Murmur heard.   Systolic murmur is present with a grade of 3/6   Pulses:       Radial pulses are 2+ on the right side, and 2+ on the left side.        Posterior tibial " pulses are 2+ on the right side, and 2+ on the left side.   Pulmonary/Chest: Effort normal and breath sounds normal. No respiratory distress. He has no wheezes. He has no rales.   Abdominal: Soft. Bowel sounds are normal. He exhibits no distension. There is no tenderness.   Musculoskeletal: He exhibits no edema.   Neurological: He is alert and oriented to person, place, and time.   Skin: Skin is warm and dry. He is not diaphoretic.   Psychiatric: Judgment normal.       Transthoracic Echo 1/5/2017  CONCLUSIONS  Left ventricular ejection fraction is visually estimated to be 65%.  Grade I diastolic dysfunction.  Normal inferior vena cava size and inspiratory collapse.  Structurally normal mitral valve without significant stenosis.   Mild mitral regurgitation. ERO by PISA method is 0.1  sq cm.  Mildly dilated left atrium. Left atrial volume index is 38  mL/sq m.  Estimated right ventricular systolic pressure  is 30 mmHg.      Lab Results   Component Value Date/Time    CHOLSTRLTOT 120 12/04/2018 07:14 AM    LDL 63 12/04/2018 07:14 AM    HDL 33 (A) 12/04/2018 07:14 AM    TRIGLYCERIDE 119 12/04/2018 07:14 AM       Lab Results   Component Value Date/Time    SODIUM 140 12/04/2018 07:14 AM    POTASSIUM 4.1 12/04/2018 07:14 AM    CHLORIDE 107 12/04/2018 07:14 AM    CO2 26 12/04/2018 07:14 AM    GLUCOSE 90 12/04/2018 07:14 AM    BUN 25 (H) 12/04/2018 07:14 AM    CREATININE 1.35 12/04/2018 07:14 AM     Lab Results   Component Value Date/Time    ALKPHOSPHAT 34 12/04/2018 07:14 AM    ASTSGOT 15 12/04/2018 07:14 AM    ALTSGPT 14 12/04/2018 07:14 AM    TBILIRUBIN 1.0 12/04/2018 07:14 AM        Assessment:     1. Atypical atrial flutter (HCC)     2. Mixed hyperlipidemia     3. Mild mitral regurgitation  EC-ECHOCARDIOGRAM COMPLETE W/O CONT       Medical Decision Making:  Today's Assessment / Status / Plan:   1. Atrial flutter, paroxysmal s/p successful DCCV  - CHADSVASc 1  - regular rhythm on exam today  - continue aspirin 81mg    2.  Dyslipidemia  - continue simvastatin  - LDL 63 Dec 2018  - HDL 33  - reviewed AHA exercise recommendations of 150min/week of moderate-intensity exercise    3. Mild mitral regurg on echo 2017  -repeat echo in 1 year, follow up with Dr. KORINA Larios afterwards    Collaborating MD: Dr. Raymond      No Recipients

## 2019-02-27 ENCOUNTER — HOSPITAL ENCOUNTER (OUTPATIENT)
Dept: CARDIOLOGY | Facility: MEDICAL CENTER | Age: 74
End: 2019-02-27
Attending: PHYSICIAN ASSISTANT
Payer: MEDICARE

## 2019-02-27 ENCOUNTER — HOSPITAL ENCOUNTER (OUTPATIENT)
Dept: LAB | Facility: MEDICAL CENTER | Age: 74
End: 2019-02-27
Attending: FAMILY MEDICINE
Payer: MEDICARE

## 2019-02-27 DIAGNOSIS — I34.0 MILD MITRAL REGURGITATION: ICD-10-CM

## 2019-02-27 LAB
LV EJECT FRACT  99904: 60
LV EJECT FRACT MOD 2C 99903: 60.22
LV EJECT FRACT MOD 4C 99902: 63.73
LV EJECT FRACT MOD BP 99901: 62.59
PSA SERPL-MCNC: 7.54 NG/ML (ref 0–4)

## 2019-02-27 PROCEDURE — 36415 COLL VENOUS BLD VENIPUNCTURE: CPT

## 2019-02-27 PROCEDURE — 93306 TTE W/DOPPLER COMPLETE: CPT | Mod: 26 | Performed by: INTERNAL MEDICINE

## 2019-02-27 PROCEDURE — 93306 TTE W/DOPPLER COMPLETE: CPT

## 2019-02-27 PROCEDURE — 84153 ASSAY OF PSA TOTAL: CPT

## 2019-07-09 ENCOUNTER — HOSPITAL ENCOUNTER (OUTPATIENT)
Dept: LAB | Facility: MEDICAL CENTER | Age: 74
End: 2019-07-09
Attending: FAMILY MEDICINE
Payer: MEDICARE

## 2019-07-09 LAB
ALBUMIN SERPL BCP-MCNC: 4.2 G/DL (ref 3.2–4.9)
ALBUMIN/GLOB SERPL: 1.8 G/DL
ALP SERPL-CCNC: 30 U/L (ref 30–99)
ALT SERPL-CCNC: 12 U/L (ref 2–50)
ANION GAP SERPL CALC-SCNC: 6 MMOL/L (ref 0–11.9)
AST SERPL-CCNC: 14 U/L (ref 12–45)
BILIRUB SERPL-MCNC: 1.1 MG/DL (ref 0.1–1.5)
BUN SERPL-MCNC: 29 MG/DL (ref 8–22)
CALCIUM SERPL-MCNC: 9.7 MG/DL (ref 8.5–10.5)
CHLORIDE SERPL-SCNC: 109 MMOL/L (ref 96–112)
CHOLEST SERPL-MCNC: 115 MG/DL (ref 100–199)
CO2 SERPL-SCNC: 24 MMOL/L (ref 20–33)
CREAT SERPL-MCNC: 1.31 MG/DL (ref 0.5–1.4)
FASTING STATUS PATIENT QL REPORTED: NORMAL
GLOBULIN SER CALC-MCNC: 2.4 G/DL (ref 1.9–3.5)
GLUCOSE SERPL-MCNC: 100 MG/DL (ref 65–99)
HDLC SERPL-MCNC: 30 MG/DL
LDLC SERPL CALC-MCNC: 68 MG/DL
POTASSIUM SERPL-SCNC: 4.1 MMOL/L (ref 3.6–5.5)
PROT SERPL-MCNC: 6.6 G/DL (ref 6–8.2)
SODIUM SERPL-SCNC: 139 MMOL/L (ref 135–145)
TRIGL SERPL-MCNC: 85 MG/DL (ref 0–149)

## 2019-07-09 PROCEDURE — 36415 COLL VENOUS BLD VENIPUNCTURE: CPT

## 2019-07-09 PROCEDURE — 80061 LIPID PANEL: CPT

## 2019-07-09 PROCEDURE — 80053 COMPREHEN METABOLIC PANEL: CPT

## 2019-11-13 ENCOUNTER — IMMUNIZATION (OUTPATIENT)
Dept: SOCIAL WORK | Facility: CLINIC | Age: 74
End: 2019-11-13
Payer: MEDICARE

## 2019-11-13 DIAGNOSIS — Z23 NEED FOR VACCINATION: ICD-10-CM

## 2019-11-13 PROCEDURE — G0008 ADMIN INFLUENZA VIRUS VAC: HCPCS | Performed by: REGISTERED NURSE

## 2019-11-13 PROCEDURE — 90662 IIV NO PRSV INCREASED AG IM: CPT | Performed by: REGISTERED NURSE

## 2020-01-08 ASSESSMENT — ENCOUNTER SYMPTOMS
SHORTNESS OF BREATH: 0
NAUSEA: 0
WEIGHT LOSS: 0
COUGH: 0
BLOOD IN STOOL: 0
DIZZINESS: 0
CHILLS: 0
FEVER: 0
ORTHOPNEA: 0
PALPITATIONS: 0

## 2020-01-08 NOTE — PROGRESS NOTES
Chief Complaint   Patient presents with   • Atrial Flutter     F/V: 1 YR       Subjective:   Hardy Roca is a 73 y.o. male with history of paroxysmal atrial flutter s/p successful DCCV 2015, mild mitral regurgitation, HLD who presents today for annual follow up.      Last seen 1 year ago by by myself. Previous patient of Dr. KORINA Larios.     Over the last year he has been well. Denies chest pain/pressure, palpitations, shortness of breath, dizziness, swelling. He uses a stationary bike, goes for about 30min 3 times a week.    He doesn't check BP very often, blood pressure are usually 140/80s at home.     No changes to his health. He minimizes his salt intake and rarely uses alcohol.    Past Medical History:   Diagnosis Date   • Atrial flutter (HCC)    • BPH (benign prostatic hypertrophy)    • CATARACT 2006, 2007    both done, one at a time   • HLD (hyperlipidemia)    • Unspecified urinary incontinence     prostate enlargement , per patient     Past Surgical History:   Procedure Laterality Date   • RECOVERY  11/23/2015    Procedure: CATH LAB-CARDIOVERSION-KOZTOWSKI-ANESTHESIA-ICD 10: I48.91;  Surgeon: Recoveryonly Surgery;  [place] [facility] [place]:    • INGUINAL HERNIA REPAIR  10/28/2009    Performed by ANGELI CHOI at SURGERY [facility]   • HERNIA REPAIR  2009   • OTHER  1982    tonsillectomy   • APPENDECTOMY     • CATARACT EXTRACTION WITH IOL      bilateral     Family History   Problem Relation Age of Onset   • Heart Disease Neg Hx    • Heart Failure Neg Hx      Social History     Socioeconomic History   • Marital status:      Spouse name: Not on file   • Number of children: Not on file   • Years of education: Not on file   • Highest education level: Not on file   Occupational History   • Not on file   Social Needs   • Financial resource strain: Not on file   • Food insecurity:     Worry: Not on file     Inability: Not on file   • Transportation needs:     Medical: Not  on file     Non-medical: Not on file   Tobacco Use   • Smoking status: Never Smoker   • Smokeless tobacco: Never Used   Substance and Sexual Activity   • Alcohol use: Yes     Comment: 2 per week   • Drug use: No   • Sexual activity: Not on file   Lifestyle   • Physical activity:     Days per week: Not on file     Minutes per session: Not on file   • Stress: Not on file   Relationships   • Social connections:     Talks on phone: Not on file     Gets together: Not on file     Attends Jewish service: Not on file     Active member of club or organization: Not on file     Attends meetings of clubs or organizations: Not on file     Relationship status: Not on file   • Intimate partner violence:     Fear of current or ex partner: Not on file     Emotionally abused: Not on file     Physically abused: Not on file     Forced sexual activity: Not on file   Other Topics Concern   • Not on file   Social History Narrative   • Not on file     No Known Allergies  Outpatient Encounter Medications as of 1/9/2020   Medication Sig Dispense Refill   • Multiple Vitamins-Minerals (OCUVITE PO) Take  by mouth.     • aspirin EC (ECOTRIN) 81 MG Tablet Delayed Response Take 81 mg by mouth every day.     • desmopressin (DDAVP) 0.2 MG tablet every evening.     • terazosin (HYTRIN) 5 MG Cap every evening.     • simvastatin (ZOCOR) 20 MG Tab Take 1 Tab by mouth every evening. 30 Tab 11     No facility-administered encounter medications on file as of 1/9/2020.      Review of Systems   Constitutional: Negative for chills, fever and weight loss.   HENT: Negative for nosebleeds.    Respiratory: Negative for cough and shortness of breath.    Cardiovascular: Negative for chest pain, palpitations, orthopnea, leg swelling and PND.   Gastrointestinal: Negative for blood in stool, melena and nausea.   Genitourinary: Negative for hematuria.   Neurological: Negative for dizziness.   Psychiatric/Behavioral: Negative for depression.        Objective:   BP  "112/80 (BP Location: Left arm, Patient Position: Sitting, BP Cuff Size: Adult)   Pulse 74   Ht 1.753 m (5' 9\")   Wt 81.6 kg (180 lb)   SpO2 95%   BMI 26.58 kg/m²     Physical Exam   Constitutional: He is oriented to person, place, and time. He appears well-developed and well-nourished. No distress.   HENT:   Head: Normocephalic and atraumatic.   Eyes: Conjunctivae are normal. No scleral icterus.   Clear drainage from right eye   Neck: No JVD present.   Cardiovascular: Normal rate and intact distal pulses. An irregularly irregular rhythm present.   Murmur (2/6 apical systolic murmur) heard.  Pulmonary/Chest: Effort normal and breath sounds normal. No respiratory distress. He has no rales.   Abdominal: Soft. Bowel sounds are normal. He exhibits no distension. There is no tenderness.   Musculoskeletal:         General: No edema.   Neurological: He is alert and oriented to person, place, and time.   Skin: Skin is warm and dry. He is not diaphoretic.   Psychiatric: Judgment normal.   Vitals reviewed.      Transthoracic Echo 1/5/2017  CONCLUSIONS  Left ventricular ejection fraction is visually estimated to be 65%.  Grade I diastolic dysfunction.  Normal inferior vena cava size and inspiratory collapse.  Structurally normal mitral valve without significant stenosis.   Mild mitral regurgitation. ERO by PISA method is 0.1  sq cm.  Mildly dilated left atrium. Left atrial volume index is 38  mL/sq m.  Estimated right ventricular systolic pressure  is 30 mmHg.    TTE 2/27/19  Normal left ventricular chamber size.  Left ventricular ejection fraction is visually estimated to be 60%.  Severely dilated left atrium.  Mildly dilated right ventricle.  Normal right ventricular systolic function.  Mild aortic insufficiency.  Mild mitral regurgitation.  Estimated right ventricular systolic pressure  is 30 mmHg.  Normal inferior vena cava size and inspiratory collapse.  Assessment:     1. Paroxysmal atrial fibrillation (HCC)  EKG "   2. Nonrheumatic mitral valve regurgitation     3. Mixed hyperlipidemia         Medical Decision Making:  Today's Assessment / Status / Plan:   1. Atrial flutter, paroxysmal s/p successful DCCV  - CHADSVASc 2 (age)  - ekg in office today shows sinus rhythm with frequent ectopy.   - No documented episodes since 2015, continue aspirin 81mg. With LA enlargement, consider holter/OAC discussion at next visit    2. Dyslipidemia  - continue simvastatin  - LDL controlled  - reviewed AHA exercise recommendations of 150min/week of moderate-intensity exercise    3. Mild mitral regurg and aortic regurg on echo 2019  - repeat echo 2-3 years  - discussed blood pressure management, if BP is consistently >140 at home, recommend starting a BP medication with lifestyle changes, he will address this with his PCP next month.      Collaborating MD: Dr. Millard

## 2020-01-09 ENCOUNTER — OFFICE VISIT (OUTPATIENT)
Dept: CARDIOLOGY | Facility: MEDICAL CENTER | Age: 75
End: 2020-01-09
Payer: MEDICARE

## 2020-01-09 VITALS
SYSTOLIC BLOOD PRESSURE: 112 MMHG | HEART RATE: 74 BPM | WEIGHT: 180 LBS | OXYGEN SATURATION: 95 % | HEIGHT: 69 IN | BODY MASS INDEX: 26.66 KG/M2 | DIASTOLIC BLOOD PRESSURE: 80 MMHG

## 2020-01-09 DIAGNOSIS — I34.0 NONRHEUMATIC MITRAL VALVE REGURGITATION: ICD-10-CM

## 2020-01-09 DIAGNOSIS — I48.0 PAROXYSMAL ATRIAL FIBRILLATION (HCC): ICD-10-CM

## 2020-01-09 DIAGNOSIS — E78.2 MIXED HYPERLIPIDEMIA: ICD-10-CM

## 2020-01-09 LAB — EKG IMPRESSION: NORMAL

## 2020-01-09 PROCEDURE — 93000 ELECTROCARDIOGRAM COMPLETE: CPT | Performed by: INTERNAL MEDICINE

## 2020-01-09 PROCEDURE — 99213 OFFICE O/P EST LOW 20 MIN: CPT | Performed by: PHYSICIAN ASSISTANT

## 2020-01-09 ASSESSMENT — ENCOUNTER SYMPTOMS
PND: 0
DEPRESSION: 0

## 2020-01-28 ENCOUNTER — HOSPITAL ENCOUNTER (OUTPATIENT)
Dept: LAB | Facility: MEDICAL CENTER | Age: 75
End: 2020-01-28
Attending: FAMILY MEDICINE
Payer: MEDICARE

## 2020-01-28 LAB
ANISOCYTOSIS BLD QL SMEAR: ABNORMAL
BASOPHILS # BLD AUTO: 0 % (ref 0–1.8)
BASOPHILS # BLD: 0 K/UL (ref 0–0.12)
CHOLEST SERPL-MCNC: 109 MG/DL (ref 100–199)
DACRYOCYTES BLD QL SMEAR: NORMAL
EOSINOPHIL # BLD AUTO: 0.46 K/UL (ref 0–0.51)
EOSINOPHIL NFR BLD: 4.3 % (ref 0–6.9)
ERYTHROCYTE [DISTWIDTH] IN BLOOD BY AUTOMATED COUNT: 36.2 FL (ref 35.9–50)
FASTING STATUS PATIENT QL REPORTED: NORMAL
HCT VFR BLD AUTO: 41.9 % (ref 42–52)
HDLC SERPL-MCNC: 30 MG/DL
HGB BLD-MCNC: 12.4 G/DL (ref 14–18)
LDLC SERPL CALC-MCNC: 53 MG/DL
LYMPHOCYTES # BLD AUTO: 5.67 K/UL (ref 1–4.8)
LYMPHOCYTES NFR BLD: 53.5 % (ref 22–41)
MANUAL DIFF BLD: NORMAL
MCH RBC QN AUTO: 19.6 PG (ref 27–33)
MCHC RBC AUTO-ENTMCNC: 29.6 G/DL (ref 33.7–35.3)
MCV RBC AUTO: 66.2 FL (ref 81.4–97.8)
MICROCYTES BLD QL SMEAR: ABNORMAL
MONOCYTES # BLD AUTO: 0.73 K/UL (ref 0–0.85)
MONOCYTES NFR BLD AUTO: 6.9 % (ref 0–13.4)
MORPHOLOGY BLD-IMP: NORMAL
NEUTROPHILS # BLD AUTO: 3.74 K/UL (ref 1.82–7.42)
NEUTROPHILS NFR BLD: 35.3 % (ref 44–72)
NRBC # BLD AUTO: 0 K/UL
NRBC BLD-RTO: 0 /100 WBC
OVALOCYTES BLD QL SMEAR: NORMAL
PLATELET # BLD AUTO: 183 K/UL (ref 164–446)
PLATELET BLD QL SMEAR: NORMAL
POIKILOCYTOSIS BLD QL SMEAR: NORMAL
RBC # BLD AUTO: 6.33 M/UL (ref 4.7–6.1)
RBC BLD AUTO: PRESENT
TRIGL SERPL-MCNC: 132 MG/DL (ref 0–149)
TSH SERPL DL<=0.005 MIU/L-ACNC: 5.15 UIU/ML (ref 0.38–5.33)
VARIANT LYMPHS BLD QL SMEAR: NORMAL
WBC # BLD AUTO: 10.6 K/UL (ref 4.8–10.8)

## 2020-01-28 PROCEDURE — 84443 ASSAY THYROID STIM HORMONE: CPT

## 2020-01-28 PROCEDURE — 80061 LIPID PANEL: CPT

## 2020-01-28 PROCEDURE — 80053 COMPREHEN METABOLIC PANEL: CPT

## 2020-01-28 PROCEDURE — 85007 BL SMEAR W/DIFF WBC COUNT: CPT

## 2020-01-28 PROCEDURE — 85027 COMPLETE CBC AUTOMATED: CPT

## 2020-01-28 PROCEDURE — 36415 COLL VENOUS BLD VENIPUNCTURE: CPT

## 2020-01-29 LAB
ALBUMIN SERPL BCP-MCNC: 4.4 G/DL (ref 3.2–4.9)
ALBUMIN/GLOB SERPL: 1.6 G/DL
ALP SERPL-CCNC: 31 U/L (ref 30–99)
ALT SERPL-CCNC: 14 U/L (ref 2–50)
ANION GAP SERPL CALC-SCNC: 7 MMOL/L (ref 0–11.9)
AST SERPL-CCNC: 16 U/L (ref 12–45)
BILIRUB SERPL-MCNC: 1.2 MG/DL (ref 0.1–1.5)
BUN SERPL-MCNC: 25 MG/DL (ref 8–22)
CALCIUM SERPL-MCNC: 9.9 MG/DL (ref 8.5–10.5)
CHLORIDE SERPL-SCNC: 109 MMOL/L (ref 96–112)
CO2 SERPL-SCNC: 25 MMOL/L (ref 20–33)
CREAT SERPL-MCNC: 1.4 MG/DL (ref 0.5–1.4)
GLOBULIN SER CALC-MCNC: 2.7 G/DL (ref 1.9–3.5)
GLUCOSE SERPL-MCNC: 96 MG/DL (ref 65–99)
POTASSIUM SERPL-SCNC: 4.2 MMOL/L (ref 3.6–5.5)
PROT SERPL-MCNC: 7.1 G/DL (ref 6–8.2)
SODIUM SERPL-SCNC: 141 MMOL/L (ref 135–145)

## 2020-08-04 ENCOUNTER — HOSPITAL ENCOUNTER (OUTPATIENT)
Dept: LAB | Facility: MEDICAL CENTER | Age: 75
End: 2020-08-04
Attending: FAMILY MEDICINE
Payer: MEDICARE

## 2020-08-04 LAB
ALBUMIN SERPL BCP-MCNC: 4.3 G/DL (ref 3.2–4.9)
ALBUMIN/GLOB SERPL: 1.9 G/DL
ALP SERPL-CCNC: 36 U/L (ref 30–99)
ALT SERPL-CCNC: 13 U/L (ref 2–50)
ANION GAP SERPL CALC-SCNC: 10 MMOL/L (ref 7–16)
AST SERPL-CCNC: 15 U/L (ref 12–45)
BILIRUB SERPL-MCNC: 1 MG/DL (ref 0.1–1.5)
BUN SERPL-MCNC: 19 MG/DL (ref 8–22)
CALCIUM SERPL-MCNC: 9.3 MG/DL (ref 8.4–10.2)
CHLORIDE SERPL-SCNC: 108 MMOL/L (ref 96–112)
CHOLEST SERPL-MCNC: 110 MG/DL (ref 100–199)
CO2 SERPL-SCNC: 23 MMOL/L (ref 20–33)
CREAT SERPL-MCNC: 1.25 MG/DL (ref 0.5–1.4)
FASTING STATUS PATIENT QL REPORTED: NORMAL
GLOBULIN SER CALC-MCNC: 2.3 G/DL (ref 1.9–3.5)
GLUCOSE SERPL-MCNC: 104 MG/DL (ref 65–99)
HDLC SERPL-MCNC: 35 MG/DL
LDLC SERPL CALC-MCNC: 57 MG/DL
POTASSIUM SERPL-SCNC: 4.6 MMOL/L (ref 3.6–5.5)
PROT SERPL-MCNC: 6.6 G/DL (ref 6–8.2)
SODIUM SERPL-SCNC: 141 MMOL/L (ref 135–145)
T4 FREE SERPL-MCNC: 1.27 NG/DL (ref 0.93–1.7)
TRIGL SERPL-MCNC: 92 MG/DL (ref 0–149)
TSH SERPL DL<=0.005 MIU/L-ACNC: 3.95 UIU/ML (ref 0.38–5.33)

## 2020-08-04 PROCEDURE — 36415 COLL VENOUS BLD VENIPUNCTURE: CPT

## 2020-08-04 PROCEDURE — 80053 COMPREHEN METABOLIC PANEL: CPT

## 2020-08-04 PROCEDURE — 84443 ASSAY THYROID STIM HORMONE: CPT

## 2020-08-04 PROCEDURE — 84439 ASSAY OF FREE THYROXINE: CPT

## 2020-08-04 PROCEDURE — 80061 LIPID PANEL: CPT

## 2020-10-09 ENCOUNTER — HOSPITAL ENCOUNTER (OUTPATIENT)
Dept: LAB | Facility: MEDICAL CENTER | Age: 75
End: 2020-10-09
Attending: PHYSICIAN ASSISTANT
Payer: MEDICARE

## 2020-10-09 PROCEDURE — 84153 ASSAY OF PSA TOTAL: CPT

## 2020-10-09 PROCEDURE — 36415 COLL VENOUS BLD VENIPUNCTURE: CPT

## 2020-10-09 PROCEDURE — 84154 ASSAY OF PSA FREE: CPT

## 2020-10-10 ENCOUNTER — IMMUNIZATION (OUTPATIENT)
Dept: SOCIAL WORK | Facility: CLINIC | Age: 75
End: 2020-10-10
Payer: MEDICARE

## 2020-10-10 DIAGNOSIS — Z23 NEED FOR VACCINATION: ICD-10-CM

## 2020-10-10 PROCEDURE — G0008 ADMIN INFLUENZA VIRUS VAC: HCPCS | Performed by: REGISTERED NURSE

## 2020-10-10 PROCEDURE — 90662 IIV NO PRSV INCREASED AG IM: CPT | Performed by: REGISTERED NURSE

## 2020-10-12 LAB
PSA FREE MFR SERPL: 31 %
PSA FREE SERPL-MCNC: 2.5 NG/ML
PSA SERPL-MCNC: 8 NG/ML (ref 0–4)

## 2021-01-08 ENCOUNTER — OFFICE VISIT (OUTPATIENT)
Dept: CARDIOLOGY | Facility: MEDICAL CENTER | Age: 76
End: 2021-01-08
Payer: MEDICARE

## 2021-01-08 VITALS
BODY MASS INDEX: 26.57 KG/M2 | HEART RATE: 82 BPM | SYSTOLIC BLOOD PRESSURE: 130 MMHG | WEIGHT: 179.4 LBS | DIASTOLIC BLOOD PRESSURE: 70 MMHG | RESPIRATION RATE: 16 BRPM | OXYGEN SATURATION: 95 % | HEIGHT: 69 IN

## 2021-01-08 DIAGNOSIS — E78.2 MIXED HYPERLIPIDEMIA: ICD-10-CM

## 2021-01-08 DIAGNOSIS — I48.92 PAROXYSMAL ATRIAL FLUTTER (HCC): ICD-10-CM

## 2021-01-08 DIAGNOSIS — Z79.82 ENCOUNTER FOR MONITORING OF CHRONIC ASPIRIN THERAPY: ICD-10-CM

## 2021-01-08 DIAGNOSIS — Z51.81 ENCOUNTER FOR MONITORING OF CHRONIC ASPIRIN THERAPY: ICD-10-CM

## 2021-01-08 PROCEDURE — 99214 OFFICE O/P EST MOD 30 MIN: CPT | Performed by: INTERNAL MEDICINE

## 2021-01-08 SDOH — HEALTH STABILITY: MENTAL HEALTH: HOW OFTEN DO YOU HAVE A DRINK CONTAINING ALCOHOL?: 2-4 TIMES A MONTH

## 2021-01-08 ASSESSMENT — ENCOUNTER SYMPTOMS
MYALGIAS: 0
SHORTNESS OF BREATH: 0
PALPITATIONS: 0
DIZZINESS: 0
COUGH: 0
LOSS OF CONSCIOUSNESS: 0

## 2021-01-08 ASSESSMENT — FIBROSIS 4 INDEX: FIB4 SCORE: 1.73

## 2021-01-08 NOTE — PROGRESS NOTES
Chief Complaint   Patient presents with   • Atrial Fibrillation   • Hyperlipidemia       Subjective:   Hardy Roca is a 75 y.o. male who presents today for follow-up evaluation of paroxysmal atrial flutter, chronic aspirin therapy, S/P cardioversion and dyslipidemia.    Last seen 1/9/2020 Aziza GRIMALDO    Since 1/9/2020 the patient denies any cardiac problems or symptoms.  No palpitations.  No bleeding problems.    Past Medical History:   Diagnosis Date   • Atrial flutter (HCC)    • BPH (benign prostatic hypertrophy)    • CATARACT 2006, 2007    both done, one at a time   • HLD (hyperlipidemia)    • Unspecified urinary incontinence     prostate enlargement , per patient     Past Surgical History:   Procedure Laterality Date   • RECOVERY  11/23/2015    Procedure: CATH LAB-CARDIOVERSION-KOZTOWSKI-ANESTHESIA-ICD 10: I48.91;  Surgeon: [facility];  Location: SURGERY PRE-POST PROC UNIT [facility] [place];  Service:    • INGUINAL HERNIA REPAIR  10/28/2009    Performed by ANGELI CHOI at SURGERY [facility]   • HERNIA REPAIR  2009   • OTHER  1982    tonsillectomy   • APPENDECTOMY     • CATARACT EXTRACTION WITH IOL      bilateral     Family History   Problem Relation Age of Onset   • Heart Disease Neg Hx    • Heart Failure Neg Hx      Social History     Socioeconomic History   • Marital status:      Spouse name: Not on file   • Number of children: Not on file   • Years of education: Not on file   • Highest education level: Not on file   Occupational History   • Not on file   Social Needs   • Financial resource strain: Not on file   • Food insecurity     Worry: Not on file     Inability: Not on file   • Transportation needs     Medical: Not on file     Non-medical: Not on file   Tobacco Use   • Smoking status: Never Smoker   • Smokeless tobacco: Never Used   Substance and Sexual Activity   • Alcohol use: Yes     Alcohol/week: 1.2 oz     Types: 2 Cans of beer per week     Frequency: 2-4 times a month      "Comment: 2 per month   • Drug use: No   • Sexual activity: Not on file   Lifestyle   • Physical activity     Days per week: Not on file     Minutes per session: Not on file   • Stress: Not on file   Relationships   • Social connections     Talks on phone: Not on file     Gets together: Not on file     Attends Christian service: Not on file     Active member of club or organization: Not on file     Attends meetings of clubs or organizations: Not on file     Relationship status: Not on file   • Intimate partner violence     Fear of current or ex partner: Not on file     Emotionally abused: Not on file     Physically abused: Not on file     Forced sexual activity: Not on file   Other Topics Concern   • Not on file   Social History Narrative   • Not on file     No Known Allergies  Outpatient Encounter Medications as of 1/8/2021   Medication Sig Dispense Refill   • Multiple Vitamins-Minerals (OCUVITE PO) Take  by mouth.     • aspirin EC (ECOTRIN) 81 MG Tablet Delayed Response Take 81 mg by mouth every day.     • terazosin (HYTRIN) 5 MG Cap every evening.     • simvastatin (ZOCOR) 20 MG Tab Take 1 Tab by mouth every evening. 30 Tab 11   • desmopressin (DDAVP) 0.2 MG tablet every evening.       No facility-administered encounter medications on file as of 1/8/2021.      Review of Systems   Respiratory: Negative for cough and shortness of breath.    Cardiovascular: Negative for chest pain and palpitations.   Musculoskeletal: Negative for myalgias.   Neurological: Negative for dizziness and loss of consciousness.        Objective:   /70 (BP Location: Left arm, Patient Position: Sitting, BP Cuff Size: Adult)   Pulse 82   Resp 16   Ht 1.753 m (5' 9\")   Wt 81.4 kg (179 lb 6.4 oz)   SpO2 95%   BMI 26.49 kg/m²     Physical Exam   Constitutional: He is oriented to person, place, and time. He appears well-developed and well-nourished. No distress.   Eyes: Pupils are equal, round, and reactive to light. Conjunctivae and " EOM are normal.   Neck: Normal range of motion. Neck supple. No JVD present.   Cardiovascular: Normal rate, regular rhythm, normal heart sounds and intact distal pulses. Exam reveals no gallop and no friction rub.   No murmur heard.  Pulses:       Carotid pulses are 2+ on the right side and 2+ on the left side.  Pulmonary/Chest: Effort normal and breath sounds normal. No accessory muscle usage. No respiratory distress. He has no wheezes. He has no rales.   Abdominal: Soft. He exhibits no distension and no mass. There is no hepatosplenomegaly. There is no abdominal tenderness.   Musculoskeletal:         General: No edema.   Neurological: He is alert and oriented to person, place, and time.   Skin: Skin is warm, dry and intact. No rash noted. No cyanosis. Nails show no clubbing.   Psychiatric: He has a normal mood and affect. His behavior is normal.   Vitals reviewed.    EKG 1/9/2020 normal sinus rhythm, rate 60.  PVC.  PAC.  Personally reviewed.    Assessment:     1. Paroxysmal atrial flutter (HCC)     2. Encounter for monitoring of chronic aspirin therapy     3. Mixed hyperlipidemia         Medical Decision Making:  Today's Assessment / Status / Plan:     Assessment  1.  Paroxysmal atrial flutter.  2.  S/P electrical cardioversion 2015.  3.  Chronic aspirin therapy.  4.  Dyslipidemia.  5.  Elevated PSA.    Recommendation Discussion  1.  The patient is clinically doing well with regards to his PAF and dyslipidemia.  2.  [id number] Score is 2 and reviewed using the scoring system to explain the National Recommendation for anticoagulation in his particular case.  Discussed risks and benefits, thromboembolic protection versus bleeding of anticoagulation versus aspirin therapy.  The patient ultimately decided to start anticoagulation.  Instructed to discontinue aspirin.  3.  Eliquis 5 mg twice daily.  4.  Reviewed recent blood work LDL at goal.  5.  RTC 1 year.

## 2021-01-11 DIAGNOSIS — Z23 NEED FOR VACCINATION: ICD-10-CM

## 2021-01-19 ENCOUNTER — HOSPITAL ENCOUNTER (OUTPATIENT)
Dept: LAB | Facility: MEDICAL CENTER | Age: 76
End: 2021-01-19
Attending: FAMILY MEDICINE
Payer: MEDICARE

## 2021-01-19 LAB
ALBUMIN SERPL BCP-MCNC: 4.3 G/DL (ref 3.2–4.9)
ALBUMIN/GLOB SERPL: 1.8 G/DL
ALP SERPL-CCNC: 40 U/L (ref 30–99)
ALT SERPL-CCNC: 16 U/L (ref 2–50)
ANION GAP SERPL CALC-SCNC: 11 MMOL/L (ref 7–16)
AST SERPL-CCNC: 15 U/L (ref 12–45)
BILIRUB SERPL-MCNC: 0.9 MG/DL (ref 0.1–1.5)
BUN SERPL-MCNC: 21 MG/DL (ref 8–22)
CALCIUM SERPL-MCNC: 9.4 MG/DL (ref 8.4–10.2)
CHLORIDE SERPL-SCNC: 105 MMOL/L (ref 96–112)
CHOLEST SERPL-MCNC: 116 MG/DL (ref 100–199)
CO2 SERPL-SCNC: 24 MMOL/L (ref 20–33)
CREAT SERPL-MCNC: 1.2 MG/DL (ref 0.5–1.4)
FASTING STATUS PATIENT QL REPORTED: NORMAL
GLOBULIN SER CALC-MCNC: 2.4 G/DL (ref 1.9–3.5)
GLUCOSE SERPL-MCNC: 101 MG/DL (ref 65–99)
HDLC SERPL-MCNC: 36 MG/DL
LDLC SERPL CALC-MCNC: 62 MG/DL
POTASSIUM SERPL-SCNC: 4.3 MMOL/L (ref 3.6–5.5)
PROT SERPL-MCNC: 6.7 G/DL (ref 6–8.2)
SODIUM SERPL-SCNC: 140 MMOL/L (ref 135–145)
TRIGL SERPL-MCNC: 90 MG/DL (ref 0–149)

## 2021-01-19 PROCEDURE — 80053 COMPREHEN METABOLIC PANEL: CPT

## 2021-01-19 PROCEDURE — 80061 LIPID PANEL: CPT

## 2021-01-19 PROCEDURE — 36415 COLL VENOUS BLD VENIPUNCTURE: CPT

## 2021-01-23 ENCOUNTER — IMMUNIZATION (OUTPATIENT)
Dept: FAMILY PLANNING/WOMEN'S HEALTH CLINIC | Facility: IMMUNIZATION CENTER | Age: 76
End: 2021-01-23
Attending: INTERNAL MEDICINE
Payer: MEDICARE

## 2021-01-23 DIAGNOSIS — Z23 ENCOUNTER FOR VACCINATION: Primary | ICD-10-CM

## 2021-01-23 DIAGNOSIS — Z23 NEED FOR VACCINATION: ICD-10-CM

## 2021-01-23 PROCEDURE — 91300 PFIZER SARS-COV-2 VACCINE: CPT

## 2021-01-23 PROCEDURE — 0001A PFIZER SARS-COV-2 VACCINE: CPT

## 2021-02-13 ENCOUNTER — IMMUNIZATION (OUTPATIENT)
Dept: FAMILY PLANNING/WOMEN'S HEALTH CLINIC | Facility: IMMUNIZATION CENTER | Age: 76
End: 2021-02-13
Attending: INTERNAL MEDICINE
Payer: MEDICARE

## 2021-02-13 DIAGNOSIS — Z23 ENCOUNTER FOR VACCINATION: Primary | ICD-10-CM

## 2021-02-13 PROCEDURE — 91300 PFIZER SARS-COV-2 VACCINE: CPT

## 2021-02-13 PROCEDURE — 0002A PFIZER SARS-COV-2 VACCINE: CPT

## 2021-03-19 ENCOUNTER — TELEPHONE (OUTPATIENT)
Dept: CARDIOLOGY | Facility: MEDICAL CENTER | Age: 76
End: 2021-03-19

## 2021-03-19 DIAGNOSIS — I48.0 PAROXYSMAL ATRIAL FIBRILLATION (HCC): ICD-10-CM

## 2021-03-19 DIAGNOSIS — I48.92 PAROXYSMAL ATRIAL FLUTTER (HCC): ICD-10-CM

## 2021-03-19 NOTE — TELEPHONE ENCOUNTER
Called and spoke to Joanna and she stated he has not started the medication yet he wanted to wait until he received his vaccinations.    RX ordered to preferred pharmacy per last OV note.  Advised to discontinue ASA.   Verbalized understanding and appreciative of call.

## 2021-03-19 NOTE — TELEPHONE ENCOUNTER
SW    Pts wife Joanna called asking if Pts prescription of Eliquis could be sent [facility].  Please call Joanna back when the prescription has been sent at [phone].    Thank you

## 2021-07-14 ENCOUNTER — HOSPITAL ENCOUNTER (OUTPATIENT)
Dept: LAB | Facility: MEDICAL CENTER | Age: 76
End: 2021-07-14
Attending: FAMILY MEDICINE
Payer: MEDICARE

## 2021-07-14 LAB
ALBUMIN SERPL BCP-MCNC: 4.5 G/DL (ref 3.2–4.9)
ALBUMIN/GLOB SERPL: 2 G/DL
ALP SERPL-CCNC: 35 U/L (ref 30–99)
ALT SERPL-CCNC: 11 U/L (ref 2–50)
ANION GAP SERPL CALC-SCNC: 10 MMOL/L (ref 7–16)
AST SERPL-CCNC: 14 U/L (ref 12–45)
BILIRUB SERPL-MCNC: 1.1 MG/DL (ref 0.1–1.5)
BUN SERPL-MCNC: 22 MG/DL (ref 8–22)
CALCIUM SERPL-MCNC: 9.2 MG/DL (ref 8.4–10.2)
CHLORIDE SERPL-SCNC: 107 MMOL/L (ref 96–112)
CHOLEST SERPL-MCNC: 115 MG/DL (ref 100–199)
CO2 SERPL-SCNC: 23 MMOL/L (ref 20–33)
CREAT SERPL-MCNC: 1.41 MG/DL (ref 0.5–1.4)
FASTING STATUS PATIENT QL REPORTED: NORMAL
GLOBULIN SER CALC-MCNC: 2.2 G/DL (ref 1.9–3.5)
GLUCOSE SERPL-MCNC: 104 MG/DL (ref 65–99)
HDLC SERPL-MCNC: 35 MG/DL
LDLC SERPL CALC-MCNC: 61 MG/DL
POTASSIUM SERPL-SCNC: 4.3 MMOL/L (ref 3.6–5.5)
PROT SERPL-MCNC: 6.7 G/DL (ref 6–8.2)
SODIUM SERPL-SCNC: 140 MMOL/L (ref 135–145)
TRIGL SERPL-MCNC: 97 MG/DL (ref 0–149)

## 2021-07-14 PROCEDURE — 80053 COMPREHEN METABOLIC PANEL: CPT

## 2021-07-14 PROCEDURE — 36415 COLL VENOUS BLD VENIPUNCTURE: CPT

## 2021-07-14 PROCEDURE — 80061 LIPID PANEL: CPT

## 2021-08-24 ENCOUNTER — PATIENT MESSAGE (OUTPATIENT)
Dept: HEALTH INFORMATION MANAGEMENT | Facility: OTHER | Age: 76
End: 2021-08-24

## 2021-10-14 ENCOUNTER — HOSPITAL ENCOUNTER (OUTPATIENT)
Dept: LAB | Facility: MEDICAL CENTER | Age: 76
End: 2021-10-14
Attending: PHYSICIAN ASSISTANT
Payer: MEDICARE

## 2021-10-14 PROCEDURE — 84153 ASSAY OF PSA TOTAL: CPT

## 2021-10-14 PROCEDURE — 84154 ASSAY OF PSA FREE: CPT

## 2021-10-14 PROCEDURE — 36415 COLL VENOUS BLD VENIPUNCTURE: CPT

## 2021-10-16 LAB
PSA FREE MFR SERPL: 32 %
PSA FREE SERPL-MCNC: 2.7 NG/ML
PSA SERPL-MCNC: 8.5 NG/ML (ref 0–4)

## 2021-11-30 ENCOUNTER — TELEPHONE (OUTPATIENT)
Dept: HEALTH INFORMATION MANAGEMENT | Facility: OTHER | Age: 76
End: 2021-11-30

## 2022-01-13 ENCOUNTER — HOSPITAL ENCOUNTER (OUTPATIENT)
Dept: LAB | Facility: MEDICAL CENTER | Age: 77
End: 2022-01-13
Attending: PHYSICIAN ASSISTANT
Payer: MEDICARE

## 2022-01-13 LAB
ALBUMIN SERPL BCP-MCNC: 4.4 G/DL (ref 3.2–4.9)
ALBUMIN/GLOB SERPL: 1.8 G/DL
ALP SERPL-CCNC: 39 U/L (ref 30–99)
ALT SERPL-CCNC: 20 U/L (ref 2–50)
ANION GAP SERPL CALC-SCNC: 8 MMOL/L (ref 7–16)
AST SERPL-CCNC: 18 U/L (ref 12–45)
BASOPHILS # BLD AUTO: 1 % (ref 0–1.8)
BASOPHILS # BLD: 0.12 K/UL (ref 0–0.12)
BILIRUB SERPL-MCNC: 0.9 MG/DL (ref 0.1–1.5)
BUN SERPL-MCNC: 24 MG/DL (ref 8–22)
CALCIUM SERPL-MCNC: 9 MG/DL (ref 8.4–10.2)
CHLORIDE SERPL-SCNC: 106 MMOL/L (ref 96–112)
CHOLEST SERPL-MCNC: 116 MG/DL (ref 100–199)
CO2 SERPL-SCNC: 24 MMOL/L (ref 20–33)
CREAT SERPL-MCNC: 1.3 MG/DL (ref 0.5–1.4)
EOSINOPHIL # BLD AUTO: 0 K/UL (ref 0–0.51)
EOSINOPHIL NFR BLD: 0 % (ref 0–6.9)
ERYTHROCYTE [DISTWIDTH] IN BLOOD BY AUTOMATED COUNT: 36.3 FL (ref 35.9–50)
EST. AVERAGE GLUCOSE BLD GHB EST-MCNC: 114 MG/DL
FASTING STATUS PATIENT QL REPORTED: NORMAL
GLOBULIN SER CALC-MCNC: 2.4 G/DL (ref 1.9–3.5)
GLUCOSE SERPL-MCNC: 105 MG/DL (ref 65–99)
HBA1C MFR BLD: 5.6 % (ref 4–5.6)
HCT VFR BLD AUTO: 38.9 % (ref 42–52)
HDLC SERPL-MCNC: 37 MG/DL
HGB BLD-MCNC: 11.7 G/DL (ref 14–18)
HYPOCHROMIA BLD QL SMEAR: ABNORMAL
LDLC SERPL CALC-MCNC: 63 MG/DL
LYMPHOCYTES # BLD AUTO: 8.47 K/UL (ref 1–4.8)
LYMPHOCYTES NFR BLD: 73 % (ref 22–41)
MANUAL DIFF BLD: NORMAL
MCH RBC QN AUTO: 19.4 PG (ref 27–33)
MCHC RBC AUTO-ENTMCNC: 30.1 G/DL (ref 33.7–35.3)
MCV RBC AUTO: 64.6 FL (ref 81.4–97.8)
MONOCYTES # BLD AUTO: 0.35 K/UL (ref 0–0.85)
MONOCYTES NFR BLD AUTO: 3 % (ref 0–13.4)
NEUTROPHILS # BLD AUTO: 2.67 K/UL (ref 1.82–7.42)
NEUTROPHILS NFR BLD: 23 % (ref 44–72)
NRBC # BLD AUTO: 0 K/UL
NRBC BLD-RTO: 0 /100 WBC
PLATELET # BLD AUTO: 194 K/UL (ref 164–446)
PLATELET BLD QL SMEAR: NORMAL
PMV BLD AUTO: 10.7 FL (ref 9–12.9)
POLYCHROMASIA BLD QL SMEAR: NORMAL
POTASSIUM SERPL-SCNC: 4.2 MMOL/L (ref 3.6–5.5)
PROT SERPL-MCNC: 6.8 G/DL (ref 6–8.2)
PSA SERPL-MCNC: 6.79 NG/ML (ref 0–4)
RBC # BLD AUTO: 6.02 M/UL (ref 4.7–6.1)
RBC BLD AUTO: PRESENT
SODIUM SERPL-SCNC: 138 MMOL/L (ref 135–145)
TRIGL SERPL-MCNC: 80 MG/DL (ref 0–149)
TSH SERPL DL<=0.005 MIU/L-ACNC: 3.99 UIU/ML (ref 0.38–5.33)
VARIANT LYMPHS BLD QL SMEAR: NORMAL
WBC # BLD AUTO: 11.6 K/UL (ref 4.8–10.8)

## 2022-01-13 PROCEDURE — 83036 HEMOGLOBIN GLYCOSYLATED A1C: CPT

## 2022-01-13 PROCEDURE — 36415 COLL VENOUS BLD VENIPUNCTURE: CPT

## 2022-01-13 PROCEDURE — 80053 COMPREHEN METABOLIC PANEL: CPT

## 2022-01-13 PROCEDURE — 85027 COMPLETE CBC AUTOMATED: CPT

## 2022-01-13 PROCEDURE — 85007 BL SMEAR W/DIFF WBC COUNT: CPT

## 2022-01-13 PROCEDURE — 80061 LIPID PANEL: CPT

## 2022-01-13 PROCEDURE — 84153 ASSAY OF PSA TOTAL: CPT

## 2022-01-13 PROCEDURE — 84443 ASSAY THYROID STIM HORMONE: CPT

## 2022-02-08 DIAGNOSIS — I48.0 PAROXYSMAL ATRIAL FIBRILLATION (HCC): ICD-10-CM

## 2022-02-08 DIAGNOSIS — I48.92 PAROXYSMAL ATRIAL FLUTTER (HCC): ICD-10-CM

## 2022-02-11 RX ORDER — APIXABAN 5 MG/1
TABLET, FILM COATED ORAL
Qty: 180 TABLET | Refills: 0 | Status: SHIPPED | OUTPATIENT
Start: 2022-02-11 | End: 2022-02-15 | Stop reason: SDUPTHER

## 2022-02-15 ENCOUNTER — OFFICE VISIT (OUTPATIENT)
Dept: CARDIOLOGY | Facility: MEDICAL CENTER | Age: 77
End: 2022-02-15
Payer: MEDICARE

## 2022-02-15 VITALS
RESPIRATION RATE: 16 BRPM | WEIGHT: 180 LBS | HEIGHT: 68 IN | BODY MASS INDEX: 27.28 KG/M2 | SYSTOLIC BLOOD PRESSURE: 112 MMHG | HEART RATE: 92 BPM | OXYGEN SATURATION: 96 % | DIASTOLIC BLOOD PRESSURE: 64 MMHG

## 2022-02-15 DIAGNOSIS — I48.0 PAROXYSMAL ATRIAL FIBRILLATION (HCC): ICD-10-CM

## 2022-02-15 DIAGNOSIS — E78.2 MIXED HYPERLIPIDEMIA: ICD-10-CM

## 2022-02-15 DIAGNOSIS — Z79.01 ANTICOAGULATED: ICD-10-CM

## 2022-02-15 DIAGNOSIS — I48.92 PAROXYSMAL ATRIAL FLUTTER (HCC): ICD-10-CM

## 2022-02-15 PROBLEM — Z51.81 ENCOUNTER FOR MONITORING OF CHRONIC ASPIRIN THERAPY: Status: RESOLVED | Noted: 2021-01-08 | Resolved: 2022-02-15

## 2022-02-15 PROBLEM — Z79.82 ENCOUNTER FOR MONITORING OF CHRONIC ASPIRIN THERAPY: Status: RESOLVED | Noted: 2021-01-08 | Resolved: 2022-02-15

## 2022-02-15 PROCEDURE — 99214 OFFICE O/P EST MOD 30 MIN: CPT | Performed by: INTERNAL MEDICINE

## 2022-02-15 ASSESSMENT — ENCOUNTER SYMPTOMS
BRUISES/BLEEDS EASILY: 0
SHORTNESS OF BREATH: 0
MYALGIAS: 0
DIZZINESS: 0
COUGH: 0
PALPITATIONS: 0
LOSS OF CONSCIOUSNESS: 0

## 2022-02-15 ASSESSMENT — FIBROSIS 4 INDEX: FIB4 SCORE: 1.6

## 2022-02-15 NOTE — PROGRESS NOTES
Chief Complaint   Patient presents with   • Atrial Flutter     F/V Dx: Paroxysmal atrial flutter (HCC)   • Hyperlipidemia       Subjective     Hardy Roca is a 76 y.o. male who presents today for follow-up evaluation of paroxysmal atrial flutter, chronic aspirin therapy, S/P cardioversion and dyslipidemia.     Since 1/8/2021 appointment the patient denies any cardiac problems or symptoms including chest pain, shortness of breath or palpitations.  No bleeding problems on apixaban.    Past Medical History:   Diagnosis Date   • Atrial flutter (HCC)    • BPH (benign prostatic hypertrophy)    • CATARACT 2006, 2007    both done, one at a time   • HLD (hyperlipidemia)    • Unspecified urinary incontinence     prostate enlargement , per patient     Past Surgical History:   Procedure Laterality Date   • RECOVERY  11/23/2015    Procedure: CATH LAB-CARDIOVERSION-KOZTOWSKI-ANESTHESIA-ICD 10: I48.91;  Surgeon: [facility];  Location: SURGERY PRE-POST PROC UNIT [facility];  Service:    • INGUINAL HERNIA REPAIR  10/28/2009    Performed by ANGELI CHOI at SURGERY [facility]   • HERNIA REPAIR  2009   • OTHER  1982    tonsillectomy   • APPENDECTOMY     • CATARACT EXTRACTION WITH IOL      bilateral     Family History   Problem Relation Age of Onset   • Heart Disease Neg Hx    • Heart Failure Neg Hx      Social History     Socioeconomic History   • Marital status:      Spouse name: Not on file   • Number of children: Not on file   • Years of education: Not on file   • Highest education level: Not on file   Occupational History   • Not on file   Tobacco Use   • Smoking status: Never Smoker   • Smokeless tobacco: Never Used   Substance and Sexual Activity   • Alcohol use: Yes     Comment: rarely   • Drug use: No   • Sexual activity: Not on file   Other Topics Concern   • Not on file   Social History Narrative   • Not on file     Social Determinants of Health     Financial Resource Strain:    • Difficulty of Paying  Living Expenses: Not on file   Food Insecurity:    • Worried About Running Out of Food in the Last Year: Not on file   • Ran Out of Food in the Last Year: Not on file   Transportation Needs:    • Lack of Transportation (Medical): Not on file   • Lack of Transportation (Non-Medical): Not on file   Physical Activity:    • Days of Exercise per Week: Not on file   • Minutes of Exercise per Session: Not on file   Stress:    • Feeling of Stress : Not on file   Social Connections:    • Frequency of Communication with Friends and Family: Not on file   • Frequency of Social Gatherings with Friends and Family: Not on file   • Attends Gnosticism Services: Not on file   • Active Member of Clubs or Organizations: Not on file   • Attends Club or Organization Meetings: Not on file   • Marital Status: Not on file   Intimate Partner Violence:    • Fear of Current or Ex-Partner: Not on file   • Emotionally Abused: Not on file   • Physically Abused: Not on file   • Sexually Abused: Not on file   Housing Stability:    • Unable to Pay for Housing in the Last Year: Not on file   • Number of Places Lived in the Last Year: Not on file   • Unstable Housing in the Last Year: Not on file     No Known Allergies  Outpatient Encounter Medications as of 2/15/2022   Medication Sig Dispense Refill   • apixaban (ELIQUIS) 5mg Tab Take 1 Tablet by mouth 2 times a day. 180 Tablet 3   • Multiple Vitamins-Minerals (OCUVITE PO) Take  by mouth.     • terazosin (HYTRIN) 5 MG Cap Take 5 mg by mouth every evening.     • simvastatin (ZOCOR) 20 MG Tab Take 1 Tab by mouth every evening. 30 Tab 11   • [DISCONTINUED] ELIQUIS 5 MG Tab TAKE ONE TABLET BY MOUTH TWICE A  Tablet 0   • [DISCONTINUED] desmopressin (DDAVP) 0.2 MG tablet every evening. (Patient not taking: Reported on 2/15/2022)       No facility-administered encounter medications on file as of 2/15/2022.     Review of Systems   Respiratory: Negative for cough and shortness of breath.   "  Cardiovascular: Negative for chest pain and palpitations.   Musculoskeletal: Negative for myalgias.   Neurological: Negative for dizziness and loss of consciousness.   Endo/Heme/Allergies: Does not bruise/bleed easily.              Objective     /64 (BP Location: Left arm, Patient Position: Sitting, BP Cuff Size: Adult)   Pulse 92   Resp 16   Ht 1.727 m (5' 8\")   Wt 81.6 kg (180 lb)   SpO2 96%   BMI 27.37 kg/m²     Physical Exam  Vitals reviewed.   Constitutional:       General: He is not in acute distress.     Appearance: He is well-developed.   Eyes:      Conjunctiva/sclera: Conjunctivae normal.      Pupils: Pupils are equal, round, and reactive to light.   Neck:      Vascular: No JVD.   Cardiovascular:      Rate and Rhythm: Normal rate and regular rhythm.      Pulses:           Carotid pulses are 2+ on the right side and 2+ on the left side.     Heart sounds: Normal heart sounds. No murmur heard.  No friction rub. No gallop.    Pulmonary:      Effort: Pulmonary effort is normal. No accessory muscle usage or respiratory distress.      Breath sounds: Normal breath sounds. No wheezing or rales.   Abdominal:      General: There is no distension.      Palpations: Abdomen is soft. There is no mass.      Tenderness: There is no abdominal tenderness.   Musculoskeletal:      Cervical back: Normal range of motion and neck supple.      Right lower leg: No edema.      Left lower leg: No edema.   Skin:     General: Skin is warm and dry.      Findings: No rash.      Nails: There is no clubbing.   Neurological:      Mental Status: He is alert and oriented to person, place, and time.   Psychiatric:         Behavior: Behavior normal.            EKG 1/9/2020 normal sinus rhythm, rate 60. PVC. PAC. Personally reviewed.       Assessment & Plan     1. Paroxysmal atrial flutter (HCC)  apixaban (ELIQUIS) 5mg Tab   2. Paroxysmal atrial fibrillation (HCC)  apixaban (ELIQUIS) 5mg Tab   3. Anticoagulated     4. Mixed " hyperlipidemia         Medical Decision Making: Today's Assessment/Status/Plan:   Assessment  1.  Paroxysmal atrial flutter.  2.  S/P electrical cardioversion 2015.  3.  Anticoagulation, apixaban  4.  Dyslipidemia.  5.  Elevated PSA.     Recommendation Discussion  1.  PAF (flutter): Clinically stable, no recurrence, continue Eliquis  2.  Anticoagulation: Continue Eliquis dosed appropriately for renal function, reviewed lab creatinine 1.3.  3.  Dyslipidemia: LDL 64, at goal, continue simvastatin.  4.  RTC 1 year, sooner if necessary.

## 2022-03-03 ENCOUNTER — PATIENT MESSAGE (OUTPATIENT)
Dept: HEALTH INFORMATION MANAGEMENT | Facility: OTHER | Age: 77
End: 2022-03-03
Payer: MEDICARE

## 2022-07-05 ENCOUNTER — TELEPHONE (OUTPATIENT)
Dept: HEALTH INFORMATION MANAGEMENT | Facility: OTHER | Age: 77
End: 2022-07-05

## 2022-07-06 ENCOUNTER — HOSPITAL ENCOUNTER (OUTPATIENT)
Dept: LAB | Facility: MEDICAL CENTER | Age: 77
End: 2022-07-06
Attending: PHYSICIAN ASSISTANT
Payer: MEDICARE

## 2022-07-06 LAB
ALBUMIN SERPL BCP-MCNC: 4.4 G/DL (ref 3.2–4.9)
ALBUMIN/GLOB SERPL: 1.9 G/DL
ALP SERPL-CCNC: 35 U/L (ref 30–99)
ALT SERPL-CCNC: 12 U/L (ref 2–50)
ANION GAP SERPL CALC-SCNC: 9 MMOL/L (ref 7–16)
AST SERPL-CCNC: 15 U/L (ref 12–45)
BILIRUB SERPL-MCNC: 0.8 MG/DL (ref 0.1–1.5)
BUN SERPL-MCNC: 23 MG/DL (ref 8–22)
CALCIUM SERPL-MCNC: 9.1 MG/DL (ref 8.4–10.2)
CHLORIDE SERPL-SCNC: 107 MMOL/L (ref 96–112)
CHOLEST SERPL-MCNC: 112 MG/DL (ref 100–199)
CO2 SERPL-SCNC: 22 MMOL/L (ref 20–33)
CREAT SERPL-MCNC: 1.35 MG/DL (ref 0.5–1.4)
FASTING STATUS PATIENT QL REPORTED: NORMAL
GFR SERPLBLD CREATININE-BSD FMLA CKD-EPI: 54 ML/MIN/1.73 M 2
GLOBULIN SER CALC-MCNC: 2.3 G/DL (ref 1.9–3.5)
GLUCOSE SERPL-MCNC: 107 MG/DL (ref 65–99)
HDLC SERPL-MCNC: 34 MG/DL
LDLC SERPL CALC-MCNC: 63 MG/DL
POTASSIUM SERPL-SCNC: 4.3 MMOL/L (ref 3.6–5.5)
PROT SERPL-MCNC: 6.7 G/DL (ref 6–8.2)
SODIUM SERPL-SCNC: 138 MMOL/L (ref 135–145)
TRIGL SERPL-MCNC: 76 MG/DL (ref 0–149)

## 2022-07-06 PROCEDURE — 80053 COMPREHEN METABOLIC PANEL: CPT

## 2022-07-06 PROCEDURE — 80061 LIPID PANEL: CPT

## 2022-07-06 PROCEDURE — 36415 COLL VENOUS BLD VENIPUNCTURE: CPT

## 2022-10-07 ENCOUNTER — HOSPITAL ENCOUNTER (OUTPATIENT)
Dept: LAB | Facility: MEDICAL CENTER | Age: 77
End: 2022-10-07
Attending: PHYSICIAN ASSISTANT
Payer: MEDICARE

## 2022-10-07 PROCEDURE — 36415 COLL VENOUS BLD VENIPUNCTURE: CPT

## 2022-10-07 PROCEDURE — 84153 ASSAY OF PSA TOTAL: CPT

## 2022-10-07 PROCEDURE — 84154 ASSAY OF PSA FREE: CPT

## 2022-10-09 LAB
PSA FREE MFR SERPL: 33 %
PSA FREE SERPL-MCNC: 2.8 NG/ML
PSA SERPL-MCNC: 8.5 NG/ML (ref 0–4)

## 2022-10-11 ENCOUNTER — DOCUMENTATION (OUTPATIENT)
Dept: HEALTH INFORMATION MANAGEMENT | Facility: OTHER | Age: 77
End: 2022-10-11
Payer: MEDICARE

## 2023-01-03 ENCOUNTER — HOSPITAL ENCOUNTER (OUTPATIENT)
Dept: LAB | Facility: MEDICAL CENTER | Age: 78
End: 2023-01-03
Attending: PHYSICIAN ASSISTANT
Payer: MEDICARE

## 2023-01-03 LAB
ALBUMIN SERPL BCP-MCNC: 4.5 G/DL (ref 3.2–4.9)
ALBUMIN/GLOB SERPL: 1.9 G/DL
ALP SERPL-CCNC: 38 U/L (ref 30–99)
ALT SERPL-CCNC: 15 U/L (ref 2–50)
ANION GAP SERPL CALC-SCNC: 10 MMOL/L (ref 7–16)
AST SERPL-CCNC: 15 U/L (ref 12–45)
BILIRUB SERPL-MCNC: 1 MG/DL (ref 0.1–1.5)
BUN SERPL-MCNC: 21 MG/DL (ref 8–22)
CALCIUM ALBUM COR SERPL-MCNC: 9.1 MG/DL (ref 8.5–10.5)
CALCIUM SERPL-MCNC: 9.5 MG/DL (ref 8.4–10.2)
CHLORIDE SERPL-SCNC: 108 MMOL/L (ref 96–112)
CHOLEST SERPL-MCNC: 117 MG/DL (ref 100–199)
CO2 SERPL-SCNC: 22 MMOL/L (ref 20–33)
CREAT SERPL-MCNC: 1.47 MG/DL (ref 0.5–1.4)
FASTING STATUS PATIENT QL REPORTED: NORMAL
GFR SERPLBLD CREATININE-BSD FMLA CKD-EPI: 48 ML/MIN/1.73 M 2
GLOBULIN SER CALC-MCNC: 2.4 G/DL (ref 1.9–3.5)
GLUCOSE SERPL-MCNC: 104 MG/DL (ref 65–99)
HDLC SERPL-MCNC: 30 MG/DL
LDLC SERPL CALC-MCNC: 59 MG/DL
POTASSIUM SERPL-SCNC: 4.2 MMOL/L (ref 3.6–5.5)
PROT SERPL-MCNC: 6.9 G/DL (ref 6–8.2)
SODIUM SERPL-SCNC: 140 MMOL/L (ref 135–145)
TRIGL SERPL-MCNC: 138 MG/DL (ref 0–149)

## 2023-01-03 PROCEDURE — 36415 COLL VENOUS BLD VENIPUNCTURE: CPT

## 2023-01-03 PROCEDURE — 80053 COMPREHEN METABOLIC PANEL: CPT

## 2023-01-03 PROCEDURE — 80061 LIPID PANEL: CPT

## 2023-02-23 DIAGNOSIS — I48.92 PAROXYSMAL ATRIAL FLUTTER (HCC): ICD-10-CM

## 2023-02-23 DIAGNOSIS — I48.0 PAROXYSMAL ATRIAL FIBRILLATION (HCC): ICD-10-CM

## 2023-03-01 RX ORDER — APIXABAN 5 MG/1
TABLET, FILM COATED ORAL
Qty: 180 TABLET | Refills: 3 | Status: SHIPPED | OUTPATIENT
Start: 2023-03-01 | End: 2024-01-04 | Stop reason: SDUPTHER

## 2023-03-03 ENCOUNTER — OFFICE VISIT (OUTPATIENT)
Dept: CARDIOLOGY | Facility: MEDICAL CENTER | Age: 78
End: 2023-03-03
Payer: MEDICARE

## 2023-03-03 VITALS
RESPIRATION RATE: 16 BRPM | SYSTOLIC BLOOD PRESSURE: 110 MMHG | DIASTOLIC BLOOD PRESSURE: 70 MMHG | OXYGEN SATURATION: 95 % | BODY MASS INDEX: 27.25 KG/M2 | WEIGHT: 184 LBS | HEIGHT: 69 IN | HEART RATE: 83 BPM

## 2023-03-03 DIAGNOSIS — E78.5 DYSLIPIDEMIA: ICD-10-CM

## 2023-03-03 DIAGNOSIS — I49.1 PAC (PREMATURE ATRIAL CONTRACTION): ICD-10-CM

## 2023-03-03 DIAGNOSIS — Z79.01 ANTICOAGULATED: ICD-10-CM

## 2023-03-03 DIAGNOSIS — I48.92 PAROXYSMAL ATRIAL FLUTTER (HCC): ICD-10-CM

## 2023-03-03 DIAGNOSIS — I49.3 PVCS (PREMATURE VENTRICULAR CONTRACTIONS): ICD-10-CM

## 2023-03-03 DIAGNOSIS — D68.59 HYPERCOAGULABLE STATE (HCC): ICD-10-CM

## 2023-03-03 PROCEDURE — 99214 OFFICE O/P EST MOD 30 MIN: CPT | Performed by: INTERNAL MEDICINE

## 2023-03-03 ASSESSMENT — ENCOUNTER SYMPTOMS
LOSS OF CONSCIOUSNESS: 0
MYALGIAS: 0
PALPITATIONS: 0
COUGH: 0
SHORTNESS OF BREATH: 0
BRUISES/BLEEDS EASILY: 0
DIZZINESS: 0

## 2023-03-03 ASSESSMENT — FIBROSIS 4 INDEX: FIB4 SCORE: 1.56

## 2023-03-03 NOTE — PROGRESS NOTES
Chief Complaint   Patient presents with    Atrial Flutter     F/v dx: Paroxysmal atrial flutter (HCC)       Subjective     Hardy Roca is a 77 y.o. male who presents today for follow-up cardiac care    The patient has PAF (flutter), OAC on apixaban, DCCV 11/23/2015, dyslipidemia, CKD, elevated PSA, microcytic anemia apparently due to thalassemia.    Since 2/15/2022 appointment the patient has had no cardiac symptoms including chest pain, palpitations, shortness of breath.  No bleeding problems.  Has microcytic anemia.  He thinks he has been told he has had thalassemia in the past.  Prior colonoscopies 1994 in 2013 apparently unremarkable.    Past Medical History:   Diagnosis Date    Atrial flutter (HCC)     BPH (benign prostatic hypertrophy)     CATARACT 2006, 2007    both done, one at a time    HLD (hyperlipidemia)     Unspecified urinary incontinence     prostate enlargement , per patient     Past Surgical History:   Procedure Laterality Date    RECOVERY  11/23/2015    Procedure: CATH LAB-CARDIOVERSION-KOZTOWSKI-ANESTHESIA-ICD 10: I48.91;  [facility];  [place] [facility] [place]  Service:     INGUINAL HERNIA REPAIR  10/28/2009    Performed by ANGELI CHOI at SURGERY [facility]    HERNIA REPAIR  2009    OTHER  1982    tonsillectomy    APPENDECTOMY      CATARACT EXTRACTION WITH IOL      bilateral     Family History   Problem Relation Age of Onset    Heart Disease Neg Hx     Heart Failure Neg Hx      Social History     Socioeconomic History    Marital status:      Spouse name: Not on file    Number of children: Not on file    Years of education: Not on file    Highest education level: Not on file   Occupational History    Not on file   Tobacco Use    Smoking status: Never    Smokeless tobacco: Never   Substance and Sexual Activity    Alcohol use: Yes     Comment: rarely    Drug use: No    Sexual activity: Not on file   Other Topics Concern    Not on file   Social  "History Narrative    Not on file     Social Determinants of Health     Financial Resource Strain: Not on file   Food Insecurity: Not on file   Transportation Needs: Not on file   Physical Activity: Not on file   Stress: Not on file   Social Connections: Not on file   Intimate Partner Violence: Not on file   Housing Stability: Not on file     No Known Allergies  Outpatient Encounter Medications as of 3/3/2023   Medication Sig Dispense Refill    ELIQUIS 5 MG Tab TAKE ONE TABLET BY MOUTH TWICE A  Tablet 3    Multiple Vitamins-Minerals (OCUVITE PO) Take  by mouth.      terazosin (HYTRIN) 5 MG Cap Take 5 mg by mouth every evening.      simvastatin (ZOCOR) 20 MG Tab Take 1 Tab by mouth every evening. 30 Tab 11     No facility-administered encounter medications on file as of 3/3/2023.     Review of Systems   Respiratory:  Negative for cough and shortness of breath.    Cardiovascular:  Negative for chest pain and palpitations.   Musculoskeletal:  Negative for myalgias.   Neurological:  Negative for dizziness and loss of consciousness.   Endo/Heme/Allergies:  Does not bruise/bleed easily.            Objective     /70 (BP Location: Left arm, Patient Position: Sitting, BP Cuff Size: Adult)   Pulse 83   Resp 16   Ht 1.753 m (5' 9\")   Wt 83.5 kg (184 lb)   SpO2 95%   BMI 27.17 kg/m²     Physical Exam  Vitals reviewed.   Constitutional:       General: He is not in acute distress.     Appearance: He is well-developed.   Neck:      Vascular: No carotid bruit or JVD.   Cardiovascular:      Rate and Rhythm: Normal rate and regular rhythm.      Pulses:           Carotid pulses are 2+ on the right side and 2+ on the left side.       Radial pulses are 2+ on the right side and 2+ on the left side.      Heart sounds: Normal heart sounds. No murmur heard.    No friction rub. No gallop.   Pulmonary:      Effort: Pulmonary effort is normal. No accessory muscle usage or respiratory distress.      Breath sounds: Normal " breath sounds. No wheezing or rales.   Abdominal:      General: There is no distension.      Palpations: Abdomen is soft. There is no mass.      Tenderness: There is no abdominal tenderness.   Musculoskeletal:      Right lower leg: No edema.      Left lower leg: No edema.   Skin:     General: Skin is warm and dry.      Findings: No rash.      Nails: There is no clubbing.   Neurological:      Mental Status: He is alert and oriented to person, place, and time.   Psychiatric:         Behavior: Behavior normal.          ECHOCARDIOGRAM 2/27/2019  Normal left ventricular chamber size.  Left ventricular ejection fraction is visually estimated to be 60%.  Severely dilated left atrium.  Mildly dilated right ventricle.  Normal right ventricular systolic function.  Mild aortic insufficiency.  Mild mitral regurgitation.  Estimated right ventricular systolic pressure  is 30 mmHg.  Normal inferior vena cava size and inspiratory collapse.    EKG 1/9/2020 normal sinus rhythm, rate 60. PVC. PAC. Personally reviewed.       Assessment & Plan     1. Paroxysmal atrial flutter (HCC)        2. Hypercoagulable state (HCC)        3. Anticoagulated        4. PVCs (premature ventricular contractions)        5. PAC (premature atrial contraction)        6. Dyslipidemia            Medical Decision Making: Today's Assessment/Status/Plan:   Assessment  Paroxysmal atrial flutter.  S/P electrical cardioversion 2015.  Anticoagulation, with apixaban  PVCs  PACs  Dyslipidemia.  CKD  Elevated PSA.  Thalassemia     Recommendation Discussion  PAF (flutter): Clinically stable, no recurrence, continue apixaban  Anticoagulation: [id number] score 2, continue apixaban dosed appropriately for renal function, reviewed lab creatinine 1.3.  PVCs, PACs, asymptomatic  Dyslipidemia: LDL 59, at goal, continue simvastatin.  RTC 1 year, sooner if necessary.

## 2023-06-28 ENCOUNTER — HOSPITAL ENCOUNTER (OUTPATIENT)
Dept: LAB | Facility: MEDICAL CENTER | Age: 78
End: 2023-06-28
Attending: FAMILY MEDICINE
Payer: MEDICARE

## 2023-06-28 LAB
ALBUMIN SERPL BCP-MCNC: 4.3 G/DL (ref 3.2–4.9)
ALBUMIN/GLOB SERPL: 2 G/DL
ALP SERPL-CCNC: 36 U/L (ref 30–99)
ALT SERPL-CCNC: 13 U/L (ref 2–50)
ANION GAP SERPL CALC-SCNC: 12 MMOL/L (ref 7–16)
AST SERPL-CCNC: 11 U/L (ref 12–45)
BILIRUB SERPL-MCNC: 0.9 MG/DL (ref 0.1–1.5)
BUN SERPL-MCNC: 29 MG/DL (ref 8–22)
CALCIUM ALBUM COR SERPL-MCNC: 9.1 MG/DL (ref 8.5–10.5)
CALCIUM SERPL-MCNC: 9.3 MG/DL (ref 8.4–10.2)
CHLORIDE SERPL-SCNC: 108 MMOL/L (ref 96–112)
CHOLEST SERPL-MCNC: 116 MG/DL (ref 100–199)
CO2 SERPL-SCNC: 20 MMOL/L (ref 20–33)
CREAT SERPL-MCNC: 1.3 MG/DL (ref 0.5–1.4)
FASTING STATUS PATIENT QL REPORTED: NORMAL
GFR SERPLBLD CREATININE-BSD FMLA CKD-EPI: 56 ML/MIN/1.73 M 2
GLOBULIN SER CALC-MCNC: 2.2 G/DL (ref 1.9–3.5)
GLUCOSE SERPL-MCNC: 103 MG/DL (ref 65–99)
HDLC SERPL-MCNC: 33 MG/DL
LDLC SERPL CALC-MCNC: 64 MG/DL
POTASSIUM SERPL-SCNC: 4.1 MMOL/L (ref 3.6–5.5)
PROT SERPL-MCNC: 6.5 G/DL (ref 6–8.2)
SODIUM SERPL-SCNC: 140 MMOL/L (ref 135–145)
TRIGL SERPL-MCNC: 95 MG/DL (ref 0–149)

## 2023-06-28 PROCEDURE — 80053 COMPREHEN METABOLIC PANEL: CPT

## 2023-06-28 PROCEDURE — 36415 COLL VENOUS BLD VENIPUNCTURE: CPT

## 2023-06-28 PROCEDURE — 80061 LIPID PANEL: CPT

## 2023-07-14 ENCOUNTER — HOSPITAL ENCOUNTER (OUTPATIENT)
Dept: LAB | Facility: MEDICAL CENTER | Age: 78
End: 2023-07-14
Attending: FAMILY MEDICINE
Payer: MEDICARE

## 2023-07-14 LAB — TSH SERPL DL<=0.005 MIU/L-ACNC: 3.61 UIU/ML (ref 0.38–5.33)

## 2023-07-14 PROCEDURE — 84443 ASSAY THYROID STIM HORMONE: CPT

## 2023-07-14 PROCEDURE — 36415 COLL VENOUS BLD VENIPUNCTURE: CPT

## 2023-10-09 ENCOUNTER — TELEPHONE (OUTPATIENT)
Dept: HEALTH INFORMATION MANAGEMENT | Facility: OTHER | Age: 78
End: 2023-10-09

## 2023-10-19 ENCOUNTER — HOSPITAL ENCOUNTER (OUTPATIENT)
Dept: LAB | Facility: MEDICAL CENTER | Age: 78
End: 2023-10-19
Attending: PHYSICIAN ASSISTANT
Payer: MEDICARE

## 2023-10-19 LAB — PSA SERPL-MCNC: 6.86 NG/ML (ref 0–4)

## 2023-10-19 PROCEDURE — 36415 COLL VENOUS BLD VENIPUNCTURE: CPT

## 2023-10-19 PROCEDURE — 84153 ASSAY OF PSA TOTAL: CPT

## 2023-12-28 ENCOUNTER — HOSPITAL ENCOUNTER (OUTPATIENT)
Dept: LAB | Facility: MEDICAL CENTER | Age: 78
End: 2023-12-28
Attending: PHYSICIAN ASSISTANT
Payer: MEDICARE

## 2023-12-28 LAB
ALBUMIN SERPL BCP-MCNC: 4.3 G/DL (ref 3.2–4.9)
ALBUMIN/GLOB SERPL: 1.7 G/DL
ALP SERPL-CCNC: 34 U/L (ref 30–99)
ALT SERPL-CCNC: 16 U/L (ref 2–50)
ANION GAP SERPL CALC-SCNC: 11 MMOL/L (ref 7–16)
AST SERPL-CCNC: 15 U/L (ref 12–45)
BILIRUB SERPL-MCNC: 0.7 MG/DL (ref 0.1–1.5)
BUN SERPL-MCNC: 34 MG/DL (ref 8–22)
CALCIUM ALBUM COR SERPL-MCNC: 9 MG/DL (ref 8.5–10.5)
CALCIUM SERPL-MCNC: 9.2 MG/DL (ref 8.4–10.2)
CHLORIDE SERPL-SCNC: 105 MMOL/L (ref 96–112)
CHOLEST SERPL-MCNC: 120 MG/DL (ref 100–199)
CO2 SERPL-SCNC: 21 MMOL/L (ref 20–33)
CREAT SERPL-MCNC: 1.33 MG/DL (ref 0.5–1.4)
FASTING STATUS PATIENT QL REPORTED: NORMAL
GFR SERPLBLD CREATININE-BSD FMLA CKD-EPI: 54 ML/MIN/1.73 M 2
GLOBULIN SER CALC-MCNC: 2.5 G/DL (ref 1.9–3.5)
GLUCOSE SERPL-MCNC: 99 MG/DL (ref 65–99)
HDLC SERPL-MCNC: 35 MG/DL
LDLC SERPL CALC-MCNC: 65 MG/DL
POTASSIUM SERPL-SCNC: 4.1 MMOL/L (ref 3.6–5.5)
PROT SERPL-MCNC: 6.8 G/DL (ref 6–8.2)
SODIUM SERPL-SCNC: 137 MMOL/L (ref 135–145)
TRIGL SERPL-MCNC: 100 MG/DL (ref 0–149)

## 2023-12-28 PROCEDURE — 80061 LIPID PANEL: CPT

## 2023-12-28 PROCEDURE — 80053 COMPREHEN METABOLIC PANEL: CPT

## 2023-12-28 PROCEDURE — 36415 COLL VENOUS BLD VENIPUNCTURE: CPT

## 2024-01-04 DIAGNOSIS — I48.0 PAROXYSMAL ATRIAL FIBRILLATION (HCC): ICD-10-CM

## 2024-01-04 DIAGNOSIS — I48.92 PAROXYSMAL ATRIAL FLUTTER (HCC): ICD-10-CM

## 2024-01-08 DIAGNOSIS — I48.0 PAROXYSMAL ATRIAL FIBRILLATION (HCC): ICD-10-CM

## 2024-01-08 DIAGNOSIS — I48.92 PAROXYSMAL ATRIAL FLUTTER (HCC): ICD-10-CM

## 2024-03-14 ENCOUNTER — OFFICE VISIT (OUTPATIENT)
Dept: CARDIOLOGY | Facility: MEDICAL CENTER | Age: 79
End: 2024-03-14
Attending: INTERNAL MEDICINE
Payer: MEDICARE

## 2024-03-14 VITALS
WEIGHT: 204 LBS | SYSTOLIC BLOOD PRESSURE: 112 MMHG | HEART RATE: 92 BPM | RESPIRATION RATE: 16 BRPM | BODY MASS INDEX: 30.92 KG/M2 | HEIGHT: 68 IN | OXYGEN SATURATION: 95 % | DIASTOLIC BLOOD PRESSURE: 62 MMHG

## 2024-03-14 DIAGNOSIS — R00.2 PALPITATIONS: ICD-10-CM

## 2024-03-14 DIAGNOSIS — R42 ORTHOSTATIC LIGHTHEADEDNESS: ICD-10-CM

## 2024-03-14 DIAGNOSIS — E78.5 DYSLIPIDEMIA: ICD-10-CM

## 2024-03-14 DIAGNOSIS — Z79.01 ANTICOAGULATED: ICD-10-CM

## 2024-03-14 DIAGNOSIS — I48.92 PAROXYSMAL ATRIAL FLUTTER (HCC): ICD-10-CM

## 2024-03-14 DIAGNOSIS — I48.0 PAROXYSMAL ATRIAL FIBRILLATION (HCC): ICD-10-CM

## 2024-03-14 PROBLEM — D68.69 SECONDARY HYPERCOAGULABLE STATE (HCC): Status: ACTIVE | Noted: 2023-03-03

## 2024-03-14 LAB — EKG IMPRESSION: NORMAL

## 2024-03-14 PROCEDURE — 93005 ELECTROCARDIOGRAM TRACING: CPT | Performed by: INTERNAL MEDICINE

## 2024-03-14 PROCEDURE — 99211 OFF/OP EST MAY X REQ PHY/QHP: CPT | Performed by: INTERNAL MEDICINE

## 2024-03-14 PROCEDURE — 99214 OFFICE O/P EST MOD 30 MIN: CPT | Performed by: INTERNAL MEDICINE

## 2024-03-14 PROCEDURE — 93010 ELECTROCARDIOGRAM REPORT: CPT | Performed by: INTERNAL MEDICINE

## 2024-03-14 PROCEDURE — 3078F DIAST BP <80 MM HG: CPT | Performed by: INTERNAL MEDICINE

## 2024-03-14 PROCEDURE — 3074F SYST BP LT 130 MM HG: CPT | Performed by: INTERNAL MEDICINE

## 2024-03-14 ASSESSMENT — ENCOUNTER SYMPTOMS
MYALGIAS: 0
DIZZINESS: 0
BRUISES/BLEEDS EASILY: 0
LOSS OF CONSCIOUSNESS: 0
SHORTNESS OF BREATH: 0
COUGH: 0
PALPITATIONS: 0

## 2024-03-14 NOTE — PROGRESS NOTES
Chief Complaint   Patient presents with    Atrial Fibrillation     F/V Dx: Paroxysmal atrial flutter (HCC)    Atrial Flutter    Premature Ventricular Contractions (PVCs)       Subjective     Hardy Roca is a 78 y.o. male who presents today for follow-up cardiac care    Here today with his wife    The patient has PAF (flutter), OAC on apixaban, DCCV 11/23/2015, dyslipidemia, CKD, elevated PSA, microcytic anemia apparently due to thalassemia.    Since 3/3/2023 appointment the patient has had some intermittent palpitations possibly once a week, brief but causes him to lie down to rest in the past.  No associated lightheadedness.  He does however have positional lightheadedness when he gets up to go to the bathroom at night or when he bends over and stands up.  These have resulted in him falling.  No injury.  Also takes terazosin.  Also has developed an anxiety syndrome with a slight aversion to driving started on antianxiety therapy    Since 2/15/2022 appointment the patient has had no cardiac symptoms including chest pain, palpitations, shortness of breath.  No bleeding problems.  Has microcytic anemia.  He thinks he has been told he has had thalassemia in the past.  Prior colonoscopies 1994 in 2013 apparently unremarkable.    Past Medical History:   Diagnosis Date    Atrial flutter (HCC)     BPH (benign prostatic hypertrophy)     CATARACT 2006, 2007    both done, one at a time    HLD (hyperlipidemia)     Unspecified urinary incontinence     prostate enlargement , per patient     Past Surgical History:   Procedure Laterality Date    RECOVERY  11/23/2015    Procedure: CATH LAB-CARDIOVERSION-KOZTOWSKI-ANESTHESIA-ICD 10: I48.91;  Surgeon: [facility];  Location: SURGERY PRE-POST PROC UNIT [facility] [place];  Service:     INGUINAL HERNIA REPAIR  10/28/2009    Performed by ANGELI CHOI at SURGERY [facility]    HERNIA REPAIR  2009    OTHER  1982    tonsillectomy    APPENDECTOMY      CATARACT EXTRACTION WITH IOL       "bilateral     Family History   Problem Relation Age of Onset    Heart Disease Neg Hx     Heart Failure Neg Hx      Social History     Socioeconomic History    Marital status:      Spouse name: Not on file    Number of children: Not on file    Years of education: Not on file    Highest education level: Not on file   Occupational History    Not on file   Tobacco Use    Smoking status: Never    Smokeless tobacco: Never   Substance and Sexual Activity    Alcohol use: Yes     Comment: rarely    Drug use: No    Sexual activity: Not on file   Other Topics Concern    Not on file   Social History Narrative    Not on file     Social Determinants of Health     Financial Resource Strain: Not on file   Food Insecurity: Not on file   Transportation Needs: Not on file   Physical Activity: Not on file   Stress: Not on file   Social Connections: Not on file   Intimate Partner Violence: Not on file   Housing Stability: Not on file     No Known Allergies  Outpatient Encounter Medications as of 3/14/2024   Medication Sig Dispense Refill    apixaban (ELIQUIS) 5mg Tab Take 1 Tablet by mouth 2 times a day. 200 Tablet 3    Multiple Vitamins-Minerals (OCUVITE PO) Take  by mouth.      terazosin (HYTRIN) 5 MG Cap Take 5 mg by mouth every evening.      simvastatin (ZOCOR) 20 MG Tab Take 1 Tab by mouth every evening. 30 Tab 11     No facility-administered encounter medications on file as of 3/14/2024.     Review of Systems   Respiratory:  Negative for cough and shortness of breath.    Cardiovascular:  Negative for chest pain and palpitations.   Musculoskeletal:  Negative for myalgias.   Neurological:  Negative for dizziness and loss of consciousness.   Endo/Heme/Allergies:  Does not bruise/bleed easily.              Objective     /62 (BP Location: Left arm, Patient Position: Sitting, BP Cuff Size: Adult)   Pulse 92   Resp 16   Ht 1.727 m (5' 8\")   Wt 92.5 kg (204 lb)   SpO2 95%   BMI 31.02 kg/m²     Physical Exam  Vitals " reviewed.   Constitutional:       General: He is not in acute distress.     Appearance: He is well-developed.   Neck:      Vascular: No carotid bruit or JVD.   Cardiovascular:      Rate and Rhythm: Normal rate and regular rhythm.      Pulses:           Carotid pulses are 2+ on the right side and 2+ on the left side.       Radial pulses are 2+ on the right side and 2+ on the left side.      Heart sounds: Normal heart sounds. No murmur heard.     No friction rub. No gallop.   Pulmonary:      Effort: Pulmonary effort is normal. No accessory muscle usage or respiratory distress.      Breath sounds: Normal breath sounds. No wheezing or rales.   Musculoskeletal:      Right lower leg: No edema.      Left lower leg: No edema.   Skin:     General: Skin is warm and dry.      Findings: No rash.      Nails: There is no clubbing.   Neurological:      Mental Status: He is alert and oriented to person, place, and time.   Psychiatric:         Behavior: Behavior normal.            ECHOCARDIOGRAM 2/27/2019  Normal left ventricular chamber size.  Left ventricular ejection fraction is visually estimated to be 60%.  Severely dilated left atrium.  Mildly dilated right ventricle.  Normal right ventricular systolic function.  Mild aortic insufficiency.  Mild mitral regurgitation.  Estimated right ventricular systolic pressure  is 30 mmHg.  Normal inferior vena cava size and inspiratory collapse.    EKG 1/9/2020 normal sinus rhythm, rate 60. PVC. PAC. Personally reviewed.     EKG 3/14/2024 sinus rhythm left anterior fascicular block, personally interpreted    Assessment & Plan     1. Palpitations  Cardiac Event Monitor      2. Orthostatic lightheadedness        3. Paroxysmal atrial fibrillation (HCC)  EKG    Cardiac Event Monitor      4. Paroxysmal atrial flutter (HCC)  Cardiac Event Monitor      5. Anticoagulated        6. Dyslipidemia            Medical Decision Making: Today's  Assessment/Status/Plan:   Assessment  Palpitations  Orthostatic dizziness  Paroxysmal atrial flutter.  S/P electrical cardioversion 2015.  Anticoagulation, with apixaban  PVCs  PACs  Dyslipidemia.  CKD  Elevated PSA.  Thalassemia     Recommendation Discussion  Palpitations, EKG today shows sinus rhythm, rate 79, after further discussion we will get a 14-day Zio patch monitor.  PAF (flutter), being evaluated with a Zio patch monitor continue apixaban  Anticoagulation: [id number] score 2, on apixaban  PVCs, PAC.  Dyslipidemia: LDL 65, at goal, continue simvastatin.  Orthostatic dizziness, reviewed reasons for this occurring, encouraged early and daily consistent hydration with electrolyte supplements  RTC 1 year, sooner if necessary with further recommendations based on Zio patch monitor

## 2024-03-18 ENCOUNTER — NON-PROVIDER VISIT (OUTPATIENT)
Dept: CARDIOLOGY | Facility: MEDICAL CENTER | Age: 79
End: 2024-03-18
Attending: INTERNAL MEDICINE
Payer: MEDICARE

## 2024-03-18 DIAGNOSIS — I48.0 PAROXYSMAL ATRIAL FIBRILLATION (HCC): ICD-10-CM

## 2024-03-18 DIAGNOSIS — R00.2 PALPITATIONS: ICD-10-CM

## 2024-03-18 DIAGNOSIS — I48.92 PAROXYSMAL ATRIAL FLUTTER (HCC): ICD-10-CM

## 2024-03-18 PROCEDURE — 93246 EXT ECG>7D<15D RECORDING: CPT

## 2024-03-18 NOTE — PROGRESS NOTES
Patient enrolled in the 14 day Zio Holter monitoring program per Eamon Raymond MD.  >Office hook-up, serial # [id number]  >Currently pending EOS.

## 2024-04-05 ENCOUNTER — TELEPHONE (OUTPATIENT)
Dept: CARDIOLOGY | Facility: MEDICAL CENTER | Age: 79
End: 2024-04-05
Payer: MEDICARE

## 2024-04-15 DIAGNOSIS — I48.0 PAROXYSMAL ATRIAL FIBRILLATION (HCC): ICD-10-CM

## 2024-04-15 RX ORDER — METOPROLOL SUCCINATE 25 MG/1
25 TABLET, EXTENDED RELEASE ORAL DAILY
Qty: 90 TABLET | Refills: 3 | Status: SHIPPED | OUTPATIENT
Start: 2024-04-15

## 2024-04-16 ENCOUNTER — APPOINTMENT (OUTPATIENT)
Dept: RADIOLOGY | Facility: MEDICAL CENTER | Age: 79
DRG: 149 | End: 2024-04-16
Attending: EMERGENCY MEDICINE
Payer: MEDICARE

## 2024-04-16 ENCOUNTER — HOSPITAL ENCOUNTER (INPATIENT)
Facility: MEDICAL CENTER | Age: 79
LOS: 5 days | DRG: 149 | End: 2024-04-24
Attending: EMERGENCY MEDICINE | Admitting: INTERNAL MEDICINE
Payer: MEDICARE

## 2024-04-16 DIAGNOSIS — R42 ORTHOSTATIC LIGHTHEADEDNESS: ICD-10-CM

## 2024-04-16 DIAGNOSIS — E78.5 DYSLIPIDEMIA: ICD-10-CM

## 2024-04-16 DIAGNOSIS — I49.3 PVCS (PREMATURE VENTRICULAR CONTRACTIONS): ICD-10-CM

## 2024-04-16 DIAGNOSIS — R42 DIZZINESS: ICD-10-CM

## 2024-04-16 DIAGNOSIS — I48.92 PAROXYSMAL ATRIAL FLUTTER (HCC): ICD-10-CM

## 2024-04-16 DIAGNOSIS — D68.69 SECONDARY HYPERCOAGULABLE STATE (HCC): ICD-10-CM

## 2024-04-16 DIAGNOSIS — Z97.8 FOLEY CATHETER IN PLACE: ICD-10-CM

## 2024-04-16 DIAGNOSIS — I49.1 PAC (PREMATURE ATRIAL CONTRACTION): ICD-10-CM

## 2024-04-16 DIAGNOSIS — D72.829 LEUKOCYTOSIS, UNSPECIFIED TYPE: ICD-10-CM

## 2024-04-16 DIAGNOSIS — Z79.01 ANTICOAGULATED: ICD-10-CM

## 2024-04-16 DIAGNOSIS — Z86.79 HISTORY OF ATRIAL FIBRILLATION: ICD-10-CM

## 2024-04-16 DIAGNOSIS — R55 NEAR SYNCOPE: ICD-10-CM

## 2024-04-16 LAB
ALBUMIN SERPL BCP-MCNC: 4 G/DL (ref 3.2–4.9)
ALBUMIN/GLOB SERPL: 1.7 G/DL
ALP SERPL-CCNC: 35 U/L (ref 30–99)
ALT SERPL-CCNC: 12 U/L (ref 2–50)
ANION GAP SERPL CALC-SCNC: 12 MMOL/L (ref 7–16)
ANISOCYTOSIS BLD QL SMEAR: ABNORMAL
APPEARANCE UR: CLEAR
APTT PPP: 29.9 SEC (ref 24.7–36)
AST SERPL-CCNC: 13 U/L (ref 12–45)
BASOPHILS # BLD AUTO: 0 % (ref 0–1.8)
BASOPHILS # BLD: 0 K/UL (ref 0–0.12)
BILIRUB SERPL-MCNC: 0.4 MG/DL (ref 0.1–1.5)
BILIRUB UR QL STRIP.AUTO: NEGATIVE
BUN SERPL-MCNC: 31 MG/DL (ref 8–22)
CALCIUM ALBUM COR SERPL-MCNC: 9.2 MG/DL (ref 8.5–10.5)
CALCIUM SERPL-MCNC: 9.2 MG/DL (ref 8.4–10.2)
CHLORIDE SERPL-SCNC: 105 MMOL/L (ref 96–112)
CO2 SERPL-SCNC: 20 MMOL/L (ref 20–33)
COLOR UR: YELLOW
CREAT SERPL-MCNC: 1.27 MG/DL (ref 0.5–1.4)
EKG IMPRESSION: NORMAL
EOSINOPHIL # BLD AUTO: 0.31 K/UL (ref 0–0.51)
EOSINOPHIL NFR BLD: 2 % (ref 0–6.9)
ERYTHROCYTE [DISTWIDTH] IN BLOOD BY AUTOMATED COUNT: 34.7 FL (ref 35.9–50)
GFR SERPLBLD CREATININE-BSD FMLA CKD-EPI: 57 ML/MIN/1.73 M 2
GLOBULIN SER CALC-MCNC: 2.3 G/DL (ref 1.9–3.5)
GLUCOSE SERPL-MCNC: 107 MG/DL (ref 65–99)
GLUCOSE UR STRIP.AUTO-MCNC: NEGATIVE MG/DL
HCT VFR BLD AUTO: 36.9 % (ref 42–52)
HGB BLD-MCNC: 11.6 G/DL (ref 14–18)
INR PPP: 1.21 (ref 0.87–1.13)
KETONES UR STRIP.AUTO-MCNC: NEGATIVE MG/DL
LEUKOCYTE ESTERASE UR QL STRIP.AUTO: ABNORMAL
LG PLATELETS BLD QL SMEAR: NORMAL
LYMPHOCYTES # BLD AUTO: 8.53 K/UL (ref 1–4.8)
LYMPHOCYTES NFR BLD: 55 % (ref 22–41)
MANUAL DIFF BLD: NORMAL
MCH RBC QN AUTO: 20.1 PG (ref 27–33)
MCHC RBC AUTO-ENTMCNC: 31.4 G/DL (ref 32.3–36.5)
MCV RBC AUTO: 64 FL (ref 81.4–97.8)
MICRO URNS: ABNORMAL
MICROCYTES BLD QL SMEAR: ABNORMAL
MONOCYTES # BLD AUTO: 1.55 K/UL (ref 0–0.85)
MONOCYTES NFR BLD AUTO: 10 % (ref 0–13.4)
NEUTROPHILS # BLD AUTO: 5.12 K/UL (ref 1.82–7.42)
NEUTROPHILS NFR BLD: 33 % (ref 44–72)
NITRITE UR QL STRIP.AUTO: NEGATIVE
NRBC # BLD AUTO: 0 K/UL
NRBC BLD-RTO: 0 /100 WBC (ref 0–0.2)
PH UR STRIP.AUTO: 6 [PH] (ref 5–8)
PLATELET # BLD AUTO: 202 K/UL (ref 164–446)
PLATELET BLD QL SMEAR: NORMAL
PMV BLD AUTO: 11.4 FL (ref 9–12.9)
POTASSIUM SERPL-SCNC: 3.8 MMOL/L (ref 3.6–5.5)
PROT SERPL-MCNC: 6.3 G/DL (ref 6–8.2)
PROT UR QL STRIP: NEGATIVE MG/DL
PROTHROMBIN TIME: 15.9 SEC (ref 12–14.6)
RBC # BLD AUTO: 5.77 M/UL (ref 4.7–6.1)
RBC # URNS HPF: ABNORMAL /HPF
RBC BLD AUTO: PRESENT
RBC UR QL AUTO: NEGATIVE
SODIUM SERPL-SCNC: 137 MMOL/L (ref 135–145)
SP GR UR STRIP.AUTO: 1.01
T4 FREE SERPL-MCNC: 1.03 NG/DL (ref 0.93–1.7)
TROPONIN T SERPL-MCNC: 19 NG/L (ref 6–19)
TSH SERPL DL<=0.005 MIU/L-ACNC: 3.05 UIU/ML (ref 0.38–5.33)
VARIANT LYMPHS BLD QL SMEAR: NORMAL
WBC # BLD AUTO: 15.5 K/UL (ref 4.8–10.8)
WBC #/AREA URNS HPF: ABNORMAL /HPF

## 2024-04-16 PROCEDURE — 85027 COMPLETE CBC AUTOMATED: CPT

## 2024-04-16 PROCEDURE — 80053 COMPREHEN METABOLIC PANEL: CPT

## 2024-04-16 PROCEDURE — A9270 NON-COVERED ITEM OR SERVICE: HCPCS | Performed by: EMERGENCY MEDICINE

## 2024-04-16 PROCEDURE — 700102 HCHG RX REV CODE 250 W/ 637 OVERRIDE(OP): Performed by: HOSPITALIST

## 2024-04-16 PROCEDURE — 99285 EMERGENCY DEPT VISIT HI MDM: CPT

## 2024-04-16 PROCEDURE — 99223 1ST HOSP IP/OBS HIGH 75: CPT | Mod: AI | Performed by: HOSPITALIST

## 2024-04-16 PROCEDURE — 36415 COLL VENOUS BLD VENIPUNCTURE: CPT

## 2024-04-16 PROCEDURE — 85730 THROMBOPLASTIN TIME PARTIAL: CPT

## 2024-04-16 PROCEDURE — 700105 HCHG RX REV CODE 258: Performed by: HOSPITALIST

## 2024-04-16 PROCEDURE — 93005 ELECTROCARDIOGRAM TRACING: CPT | Performed by: EMERGENCY MEDICINE

## 2024-04-16 PROCEDURE — 700102 HCHG RX REV CODE 250 W/ 637 OVERRIDE(OP): Performed by: EMERGENCY MEDICINE

## 2024-04-16 PROCEDURE — 81001 URINALYSIS AUTO W/SCOPE: CPT

## 2024-04-16 PROCEDURE — 85007 BL SMEAR W/DIFF WBC COUNT: CPT

## 2024-04-16 PROCEDURE — 94760 N-INVAS EAR/PLS OXIMETRY 1: CPT

## 2024-04-16 PROCEDURE — 84443 ASSAY THYROID STIM HORMONE: CPT

## 2024-04-16 PROCEDURE — 70496 CT ANGIOGRAPHY HEAD: CPT

## 2024-04-16 PROCEDURE — 84484 ASSAY OF TROPONIN QUANT: CPT

## 2024-04-16 PROCEDURE — G0378 HOSPITAL OBSERVATION PER HR: HCPCS

## 2024-04-16 PROCEDURE — 85610 PROTHROMBIN TIME: CPT

## 2024-04-16 PROCEDURE — A9270 NON-COVERED ITEM OR SERVICE: HCPCS | Performed by: HOSPITALIST

## 2024-04-16 PROCEDURE — 700117 HCHG RX CONTRAST REV CODE 255: Performed by: EMERGENCY MEDICINE

## 2024-04-16 PROCEDURE — 71045 X-RAY EXAM CHEST 1 VIEW: CPT

## 2024-04-16 PROCEDURE — 84439 ASSAY OF FREE THYROXINE: CPT

## 2024-04-16 RX ORDER — POLYETHYLENE GLYCOL 3350 17 G/17G
1 POWDER, FOR SOLUTION ORAL
Status: DISCONTINUED | OUTPATIENT
Start: 2024-04-16 | End: 2024-04-24 | Stop reason: HOSPADM

## 2024-04-16 RX ORDER — ACETAMINOPHEN 325 MG/1
650 TABLET ORAL EVERY 6 HOURS PRN
Status: DISCONTINUED | OUTPATIENT
Start: 2024-04-16 | End: 2024-04-24 | Stop reason: HOSPADM

## 2024-04-16 RX ORDER — MECLIZINE HYDROCHLORIDE 25 MG/1
50 TABLET ORAL ONCE
Status: COMPLETED | OUTPATIENT
Start: 2024-04-16 | End: 2024-04-16

## 2024-04-16 RX ORDER — SODIUM CHLORIDE 9 MG/ML
INJECTION, SOLUTION INTRAVENOUS ONCE
Status: COMPLETED | OUTPATIENT
Start: 2024-04-16 | End: 2024-04-17

## 2024-04-16 RX ORDER — ONDANSETRON 2 MG/ML
4 INJECTION INTRAMUSCULAR; INTRAVENOUS EVERY 4 HOURS PRN
Status: DISCONTINUED | OUTPATIENT
Start: 2024-04-16 | End: 2024-04-24 | Stop reason: HOSPADM

## 2024-04-16 RX ORDER — SIMVASTATIN 20 MG
20 TABLET ORAL EVERY EVENING
Status: DISCONTINUED | OUTPATIENT
Start: 2024-04-16 | End: 2024-04-17

## 2024-04-16 RX ORDER — ONDANSETRON 4 MG/1
4 TABLET, ORALLY DISINTEGRATING ORAL EVERY 4 HOURS PRN
Status: DISCONTINUED | OUTPATIENT
Start: 2024-04-16 | End: 2024-04-24 | Stop reason: HOSPADM

## 2024-04-16 RX ORDER — AMOXICILLIN 250 MG
2 CAPSULE ORAL 2 TIMES DAILY
Status: DISCONTINUED | OUTPATIENT
Start: 2024-04-16 | End: 2024-04-24 | Stop reason: HOSPADM

## 2024-04-16 RX ADMIN — SODIUM CHLORIDE: 9 INJECTION, SOLUTION INTRAVENOUS at 23:15

## 2024-04-16 RX ADMIN — APIXABAN 5 MG: 5 TABLET, FILM COATED ORAL at 23:01

## 2024-04-16 RX ADMIN — SIMVASTATIN 20 MG: 20 TABLET, FILM COATED ORAL at 23:01

## 2024-04-16 RX ADMIN — MECLIZINE HYDROCHLORIDE 50 MG: 25 TABLET ORAL at 19:43

## 2024-04-16 RX ADMIN — IOHEXOL 91 ML: 350 INJECTION, SOLUTION INTRAVENOUS at 20:58

## 2024-04-16 ASSESSMENT — CHA2DS2 SCORE
CHA2DS2 VASC SCORE: 2
AGE 65 TO 74: NO
SEX: MALE
AGE 75 OR GREATER: YES
VASCULAR DISEASE: NO
CHF OR LEFT VENTRICULAR DYSFUNCTION: NO
PRIOR STROKE OR TIA OR THROMBOEMBOLISM: NO
HYPERTENSION: NO
DIABETES: NO

## 2024-04-17 ENCOUNTER — APPOINTMENT (OUTPATIENT)
Dept: CARDIOLOGY | Facility: MEDICAL CENTER | Age: 79
DRG: 149 | End: 2024-04-17
Attending: INTERNAL MEDICINE
Payer: MEDICARE

## 2024-04-17 ENCOUNTER — APPOINTMENT (OUTPATIENT)
Dept: RADIOLOGY | Facility: MEDICAL CENTER | Age: 79
DRG: 149 | End: 2024-04-17
Attending: HOSPITALIST
Payer: MEDICARE

## 2024-04-17 PROBLEM — Z71.89 ADVANCE CARE PLANNING: Status: ACTIVE | Noted: 2024-04-17

## 2024-04-17 PROBLEM — D50.9 MICROCYTIC ANEMIA: Status: ACTIVE | Noted: 2024-04-17

## 2024-04-17 PROBLEM — D72.829 LEUKOCYTOSIS: Status: ACTIVE | Noted: 2024-04-17

## 2024-04-17 LAB
ANION GAP SERPL CALC-SCNC: 10 MMOL/L (ref 7–16)
APPEARANCE UR: CLEAR
BACTERIA #/AREA URNS HPF: ABNORMAL /HPF
BILIRUB UR QL STRIP.AUTO: NEGATIVE
BUN SERPL-MCNC: 28 MG/DL (ref 8–22)
CALCIUM SERPL-MCNC: 8.7 MG/DL (ref 8.4–10.2)
CHLORIDE SERPL-SCNC: 104 MMOL/L (ref 96–112)
CO2 SERPL-SCNC: 21 MMOL/L (ref 20–33)
COLOR UR: YELLOW
CREAT SERPL-MCNC: 1.27 MG/DL (ref 0.5–1.4)
EPI CELLS #/AREA URNS HPF: ABNORMAL /HPF
ERYTHROCYTE [DISTWIDTH] IN BLOOD BY AUTOMATED COUNT: 34.8 FL (ref 35.9–50)
EST. AVERAGE GLUCOSE BLD GHB EST-MCNC: 117 MG/DL
FLUAV RNA SPEC QL NAA+PROBE: NEGATIVE
FLUBV RNA SPEC QL NAA+PROBE: NEGATIVE
GFR SERPLBLD CREATININE-BSD FMLA CKD-EPI: 57 ML/MIN/1.73 M 2
GLUCOSE SERPL-MCNC: 101 MG/DL (ref 65–99)
GLUCOSE UR STRIP.AUTO-MCNC: NEGATIVE MG/DL
HBA1C MFR BLD: 5.7 % (ref 4–5.6)
HCT VFR BLD AUTO: 35 % (ref 42–52)
HGB BLD-MCNC: 11 G/DL (ref 14–18)
KETONES UR STRIP.AUTO-MCNC: NEGATIVE MG/DL
LEUKOCYTE ESTERASE UR QL STRIP.AUTO: ABNORMAL
LV EJECT FRACT  99904: 72
LV EJECT FRACT MOD 2C 99903: 76.2
LV EJECT FRACT MOD 4C 99902: 68.3
LV EJECT FRACT MOD BP 99901: 71.83
MCH RBC QN AUTO: 20 PG (ref 27–33)
MCHC RBC AUTO-ENTMCNC: 31.4 G/DL (ref 32.3–36.5)
MCV RBC AUTO: 63.6 FL (ref 81.4–97.8)
MICRO URNS: ABNORMAL
MUCOUS THREADS #/AREA URNS HPF: ABNORMAL /HPF
NITRITE UR QL STRIP.AUTO: NEGATIVE
PH UR STRIP.AUTO: 5.5 [PH] (ref 5–8)
PLATELET # BLD AUTO: 179 K/UL (ref 164–446)
PMV BLD AUTO: 11.5 FL (ref 9–12.9)
POTASSIUM SERPL-SCNC: 3.9 MMOL/L (ref 3.6–5.5)
PROCALCITONIN SERPL-MCNC: 0.07 NG/ML
PROT UR QL STRIP: NEGATIVE MG/DL
RBC # BLD AUTO: 5.5 M/UL (ref 4.7–6.1)
RBC # URNS HPF: ABNORMAL /HPF
RBC UR QL AUTO: ABNORMAL
RSV RNA SPEC QL NAA+PROBE: NEGATIVE
SARS-COV-2 RNA RESP QL NAA+PROBE: NOTDETECTED
SODIUM SERPL-SCNC: 135 MMOL/L (ref 135–145)
SP GR UR STRIP.AUTO: 1.02
SPECIMEN SOURCE: NORMAL
T4 FREE SERPL-MCNC: 1.16 NG/DL (ref 0.93–1.7)
TSH SERPL DL<=0.005 MIU/L-ACNC: 6.05 UIU/ML (ref 0.38–5.33)
WBC # BLD AUTO: 17.2 K/UL (ref 4.8–10.8)
WBC #/AREA URNS HPF: ABNORMAL /HPF

## 2024-04-17 PROCEDURE — 70551 MRI BRAIN STEM W/O DYE: CPT

## 2024-04-17 PROCEDURE — 81001 URINALYSIS AUTO W/SCOPE: CPT

## 2024-04-17 PROCEDURE — 80048 BASIC METABOLIC PNL TOTAL CA: CPT

## 2024-04-17 PROCEDURE — 93306 TTE W/DOPPLER COMPLETE: CPT | Mod: 26 | Performed by: INTERNAL MEDICINE

## 2024-04-17 PROCEDURE — G0378 HOSPITAL OBSERVATION PER HR: HCPCS

## 2024-04-17 PROCEDURE — 700102 HCHG RX REV CODE 250 W/ 637 OVERRIDE(OP): Performed by: HOSPITALIST

## 2024-04-17 PROCEDURE — 36415 COLL VENOUS BLD VENIPUNCTURE: CPT

## 2024-04-17 PROCEDURE — 700105 HCHG RX REV CODE 258: Performed by: INTERNAL MEDICINE

## 2024-04-17 PROCEDURE — 97162 PT EVAL MOD COMPLEX 30 MIN: CPT

## 2024-04-17 PROCEDURE — 87040 BLOOD CULTURE FOR BACTERIA: CPT | Mod: 91

## 2024-04-17 PROCEDURE — A9270 NON-COVERED ITEM OR SERVICE: HCPCS | Performed by: HOSPITALIST

## 2024-04-17 PROCEDURE — 0241U HCHG SARS-COV-2 COVID-19 NFCT DS RESP RNA 4 TRGT MIC: CPT

## 2024-04-17 PROCEDURE — 84439 ASSAY OF FREE THYROXINE: CPT

## 2024-04-17 PROCEDURE — 99233 SBSQ HOSP IP/OBS HIGH 50: CPT | Mod: 25 | Performed by: INTERNAL MEDICINE

## 2024-04-17 PROCEDURE — A9270 NON-COVERED ITEM OR SERVICE: HCPCS | Performed by: INTERNAL MEDICINE

## 2024-04-17 PROCEDURE — 85027 COMPLETE CBC AUTOMATED: CPT

## 2024-04-17 PROCEDURE — 99497 ADVNCD CARE PLAN 30 MIN: CPT | Performed by: INTERNAL MEDICINE

## 2024-04-17 PROCEDURE — 97535 SELF CARE MNGMENT TRAINING: CPT

## 2024-04-17 PROCEDURE — 84443 ASSAY THYROID STIM HORMONE: CPT

## 2024-04-17 PROCEDURE — 700102 HCHG RX REV CODE 250 W/ 637 OVERRIDE(OP): Performed by: INTERNAL MEDICINE

## 2024-04-17 PROCEDURE — 93306 TTE W/DOPPLER COMPLETE: CPT

## 2024-04-17 PROCEDURE — 84145 PROCALCITONIN (PCT): CPT

## 2024-04-17 PROCEDURE — 83036 HEMOGLOBIN GLYCOSYLATED A1C: CPT

## 2024-04-17 RX ORDER — HYDRALAZINE HYDROCHLORIDE 20 MG/ML
10 INJECTION INTRAMUSCULAR; INTRAVENOUS
Status: DISCONTINUED | OUTPATIENT
Start: 2024-04-17 | End: 2024-04-24 | Stop reason: HOSPADM

## 2024-04-17 RX ORDER — ASPIRIN 81 MG/1
81 TABLET ORAL DAILY
Status: DISCONTINUED | OUTPATIENT
Start: 2024-04-17 | End: 2024-04-17

## 2024-04-17 RX ORDER — SODIUM CHLORIDE 9 MG/ML
500 INJECTION, SOLUTION INTRAVENOUS
Status: DISCONTINUED | OUTPATIENT
Start: 2024-04-17 | End: 2024-04-18

## 2024-04-17 RX ORDER — ESCITALOPRAM OXALATE 10 MG/1
10 TABLET ORAL DAILY
COMMUNITY

## 2024-04-17 RX ORDER — ASPIRIN 325 MG
325 TABLET ORAL EVERY 6 HOURS PRN
COMMUNITY
End: 2024-04-26 | Stop reason: ALTCHOICE

## 2024-04-17 RX ORDER — LABETALOL HYDROCHLORIDE 5 MG/ML
10 INJECTION, SOLUTION INTRAVENOUS
Status: DISCONTINUED | OUTPATIENT
Start: 2024-04-17 | End: 2024-04-24 | Stop reason: HOSPADM

## 2024-04-17 RX ORDER — SODIUM CHLORIDE 9 MG/ML
500 INJECTION, SOLUTION INTRAVENOUS ONCE
Status: COMPLETED | OUTPATIENT
Start: 2024-04-17 | End: 2024-04-17

## 2024-04-17 RX ORDER — ATORVASTATIN CALCIUM 40 MG/1
40 TABLET, FILM COATED ORAL EVERY EVENING
Status: DISCONTINUED | OUTPATIENT
Start: 2024-04-17 | End: 2024-04-24 | Stop reason: HOSPADM

## 2024-04-17 RX ADMIN — ATORVASTATIN CALCIUM 40 MG: 40 TABLET, FILM COATED ORAL at 17:05

## 2024-04-17 RX ADMIN — APIXABAN 5 MG: 5 TABLET, FILM COATED ORAL at 17:06

## 2024-04-17 RX ADMIN — ASPIRIN 81 MG: 81 TABLET, COATED ORAL at 09:55

## 2024-04-17 RX ADMIN — SODIUM CHLORIDE 500 ML: 9 INJECTION, SOLUTION INTRAVENOUS at 14:44

## 2024-04-17 RX ADMIN — APIXABAN 5 MG: 5 TABLET, FILM COATED ORAL at 06:00

## 2024-04-17 RX ADMIN — DOCUSATE SODIUM 50MG AND SENNOSIDES 8.6MG 2 TABLET: 8.6; 5 TABLET, FILM COATED ORAL at 17:06

## 2024-04-17 ASSESSMENT — FIBROSIS 4 INDEX: FIB4 SCORE: 1.66

## 2024-04-17 ASSESSMENT — ENCOUNTER SYMPTOMS
FEVER: 0
VOMITING: 0
SHORTNESS OF BREATH: 0
BLURRED VISION: 0
DEPRESSION: 0
NERVOUS/ANXIOUS: 0
DOUBLE VISION: 0
BACK PAIN: 0
INSOMNIA: 0
DIZZINESS: 1
COUGH: 0
WEAKNESS: 0
HEADACHES: 0
MYALGIAS: 0
NECK PAIN: 0
FALLS: 0
CHILLS: 0
ABDOMINAL PAIN: 0
NAUSEA: 0
SORE THROAT: 0
HEARTBURN: 0
PALPITATIONS: 0
BRUISES/BLEEDS EASILY: 0

## 2024-04-17 ASSESSMENT — COGNITIVE AND FUNCTIONAL STATUS - GENERAL
STANDING UP FROM CHAIR USING ARMS: A LITTLE
MOVING FROM LYING ON BACK TO SITTING ON SIDE OF FLAT BED: A LITTLE
CLIMB 3 TO 5 STEPS WITH RAILING: A LITTLE
MOBILITY SCORE: 19
MOBILITY SCORE: 20
CLIMB 3 TO 5 STEPS WITH RAILING: A LITTLE
DAILY ACTIVITIY SCORE: 24
SUGGESTED CMS G CODE MODIFIER MOBILITY: CJ
SUGGESTED CMS G CODE MODIFIER MOBILITY: CK
WALKING IN HOSPITAL ROOM: A LITTLE
WALKING IN HOSPITAL ROOM: A LITTLE
SUGGESTED CMS G CODE MODIFIER DAILY ACTIVITY: CH
MOVING TO AND FROM BED TO CHAIR: A LITTLE
MOVING TO AND FROM BED TO CHAIR: A LITTLE
STANDING UP FROM CHAIR USING ARMS: A LITTLE

## 2024-04-17 ASSESSMENT — LIFESTYLE VARIABLES
SUBSTANCE_ABUSE: 0
ALCOHOL_USE: NO
AVERAGE NUMBER OF DAYS PER WEEK YOU HAVE A DRINK CONTAINING ALCOHOL: 0
HOW MANY TIMES IN THE PAST YEAR HAVE YOU HAD 5 OR MORE DRINKS IN A DAY: 0
HAVE PEOPLE ANNOYED YOU BY CRITICIZING YOUR DRINKING: NO
TOTAL SCORE: 0
CONSUMPTION TOTAL: NEGATIVE
TOTAL SCORE: 0
EVER HAD A DRINK FIRST THING IN THE MORNING TO STEADY YOUR NERVES TO GET RID OF A HANGOVER: NO
HAVE YOU EVER FELT YOU SHOULD CUT DOWN ON YOUR DRINKING: NO
ON A TYPICAL DAY WHEN YOU DRINK ALCOHOL HOW MANY DRINKS DO YOU HAVE: 0
TOTAL SCORE: 0
EVER FELT BAD OR GUILTY ABOUT YOUR DRINKING: NO

## 2024-04-17 ASSESSMENT — GAIT ASSESSMENTS
DISTANCE (FEET): 100
DEVIATION: STEP TO
GAIT LEVEL OF ASSIST: STANDBY ASSIST

## 2024-04-17 ASSESSMENT — PATIENT HEALTH QUESTIONNAIRE - PHQ9
SUM OF ALL RESPONSES TO PHQ9 QUESTIONS 1 AND 2: 0
2. FEELING DOWN, DEPRESSED, IRRITABLE, OR HOPELESS: NOT AT ALL
1. LITTLE INTEREST OR PLEASURE IN DOING THINGS: NOT AT ALL

## 2024-04-17 NOTE — ASSESSMENT & PLAN NOTE
No improvement with digoxin, discontinue    Continue patient on amiodarone p.o., metoprolol and apixaban.

## 2024-04-17 NOTE — DISCHARGE PLANNING
Care Transition Team Assessment    At 1425,  RN met with patient and his wife at bedside to complete assessment.   Joanna Roca, spouse, is his emergency contact. [phone] cell. Updated face sheet.   Conf[place] [address]. Patient phone number is [phone].   PCP is Dr. Long Eddy.   Pharmacy is [facility].   Patient has a cane. Wife is borrowing a walker from a friend. I advised we can provide this if patient needs it.       Information Source  Orientation Level: Oriented to person, Oriented to place, Oriented to situation, Disoriented to time  Information Given By: Patient  Who is responsible for making decisions for patient? : Patient    Readmission Evaluation  Is this a readmission?: No    Elopement Risk  Legal Hold: No  Ambulatory or Self Mobile in Wheelchair: Yes  Disoriented: No  Psychiatric Symptoms: None  History of Wandering: No  Elopement this Admit: No  Vocalizing Wanting to Leave: No  Displays Behaviors, Body Language Wanting to Leave: No-Not at Risk for Elopement    Interdisciplinary Discharge Planning  Lives with - Patient's Self Care Capacity: Significant Other  Patient or legal guardian wants to designate a caregiver: No  Support Systems: Spouse / Significant Other  Housing / Facility: 2 Story House  Durable Medical Equipment: Not Applicable    Discharge Preparedness  What is your plan after discharge?: Home with help  What are your discharge supports?: Spouse  Prior Functional Level: Ambulatory  Difficulity with ADLs: None  Difficulity with IADLs: None    Functional Assesment  Prior Functional Level: Ambulatory    Finances  Financial Barriers to Discharge: No  Prescription Coverage: Yes    Vision / Hearing Impairment  Vision Impairment : No  Hearing Impairment : No              Domestic Abuse  Have you ever been the victim of abuse or violence?: No  Physical Abuse or Sexual Abuse: No  Verbal Abuse or Emotional Abuse:  No  Possible Abuse/Neglect Reported to:: Not Applicable    Psychological Assessment  History of Substance Abuse: None  History of Psychiatric Problems: No  Non-compliant with Treatment: No  Newly Diagnosed Illness: Yes         Anticipated Discharge Information  Discharge Disposition: Discharged to home/self care (01)  Discharge Address: [address]; [place] [address]  Discharge Contact Phone Number: [phone]

## 2024-04-17 NOTE — THERAPY
Physical Therapy   Initial Evaluation     Patient Name: Hardy Roca  Age:  78 y.o., Sex:  male  Medical Record #: [id number]  Today's Date: 4/17/2024     Precautions  Precautions: (P) Fall Risk  Comments: (P) mod    Assessment  Patient is 78 y.o. male with a diagnosis of vertigo.Pt lives at home with wife and is usually active.Pt is safe with transfers and ambulation with supervision.Pt was positive for L post BPPV. Epley maneuver applied and Pt and wife taught how to do with written instructions      Plan    DC Equipment Recommendations: (P) None  Discharge Recommendations: (P)  (out Pt vestibular rehab if does not resolve)        Objective       04/17/24 1600   Charge Group   PT Evaluation PT Evaluation Mod   PT Self Care / Home Evaluation (Units) 1   Total Time Spent   PT Evaluation Time Spent (Mins) 30   Initial Contact Note    Initial Contact Note Order Received and Verified, Physical Therapy Evaluation in Progress with Full Report to Follow.   Precautions   Precautions Fall Risk   Comments mod   Prior Living Situation   Prior Services None   Housing / Facility 2 Story House   Steps Into Home 0   Steps In Home 14   Equipment Owned None   Lives with - Patient's Self Care Capacity Spouse   Prior Level of Functional Mobility   Bed Mobility Independent   Transfer Status Independent   Ambulation Independent   Ambulation Distance community amb   Assistive Devices Used None   History of Falls   History of Falls No   Cognition    Cognition / Consciousness WDL   Passive ROM Lower Body   Passive ROM Lower Body WDL   Active ROM Lower Body    Active ROM Lower Body  WDL   Strength Lower Body   Lower Body Strength  WDL   Coordination Lower Body    Coordination Lower Body  WDL   Balance Assessment   Sitting Balance (Static) Good   Sitting Balance (Dynamic) Fair +   Standing Balance (Static) Fair +   Standing Balance (Dynamic) Fair   Weight Shift Sitting Good   Weight Shift Standing Good   Bed Mobility    Supine to Sit  Modified Independent   Sit to Supine Modified Independent   Scooting Modified Independent   Gait Analysis   Gait Level Of Assist Standby Assist   Assistive Device None   Distance (Feet) 100   # of Times Distance was Traveled 2   Deviation Step To   # of Stairs Climbed 0   Weight Bearing Status full   Functional Mobility   Sit to Stand Standby Assist   Bed, Chair, Wheelchair Transfer Standby Assist   Toilet Transfers Standby Assist   6 Clicks Assessment - How much HELP from from another person do you currently need... (If the patient hasn't done an activity recently, how much help from another person do you think he/she would need if he/she tried?)   Turning from your back to your side while in a flat bed without using bedrails? 4   Moving from lying on your back to sitting on the side of a flat bed without using bedrails? 4   Moving to and from a bed to a chair (including a wheelchair)? 3   Standing up from a chair using your arms (e.g., wheelchair, or bedside chair)? 3   Walking in hospital room? 3   Climbing 3-5 steps with a railing? 3   6 clicks Mobility Score 20   Activity Tolerance   Sitting Edge of Bed 10   Standing 2 x 4 mins   Patient / Family Goals    Patient / Family Goal #1 Home   Anticipated Discharge Equipment and Recommendations   DC Equipment Recommendations None   Discharge Recommendations   (out Pt vestibular rehab if does not resolve)   Interdisciplinary Plan of Care Collaboration   IDT Collaboration with  Nursing   Session Information   Date / Session Number  4/17   Priority 0

## 2024-04-17 NOTE — ED PROVIDER NOTES
ER Provider Note    Scribed for Dr. Vernell Hagan D.O. by Sanchez Christianson. 4/16/2024  6:55 PM    Primary Care Provider: Long Eddy D.O.    CHIEF COMPLAINT  Chief Complaint   Patient presents with    Dizziness     Woke up this am dizzy and lightheaded. Laid in bed all day. Pt gets more dizzy with movement. Denies neuro deficits, unilateral weakness, no recent falls, trauma or illness. Pt has had vertigo in past but this feels different per patient.      EXTERNAL RECORDS REVIEWED  Outpatient Notes Seen by cardiology in March 2024 for palpitations    HPI/ROS    LIMITATION TO HISTORY   Select: : None  OUTSIDE HISTORIAN(S):  Significant other at bedside    Hardy Roca is a 78 y.o. male who presents to the ED for dizziness onset earlier today. He states that all day he has been in bed, he has been unable to do anything. He tried to get out of bed in the morning but could not due to the dizziness. Wife states that he does not have any strength in his legs. He denies any facial droop, slurred speech, headache, chest pain, or shortness of breath. He denies any recent nausea, vomiting, fevers, chills or other flu like symptoms. The dizziness is described as a lightheadedness, when he moves his head side to side he does not have an increase in his symptoms. He does take Eliquis but does not take an anti-hypertensive. He is followed by cardiology for Afib/Aflutter. He recently had a heart monitor that did not show anything. He was just seen yesterday and placed on metoprolol, he has not started this yet. No history of diabetes, cancer or ulcers. He currently takes Eliquis, terazosin, simvastatin, Ecitalopram and hydroxyzine.    PAST MEDICAL HISTORY  Past Medical History:   Diagnosis Date    Atrial flutter (HCC)     BPH (benign prostatic hypertrophy)     CATARACT 2006, 2007    both done, one at a time    HLD (hyperlipidemia)     Unspecified urinary incontinence     prostate enlargement , per patient       SURGICAL  HISTORY  Past Surgical History:   Procedure Laterality Date    RECOVERY  11/23/2015    Procedure: CATH LAB-CARDIOVERSION-KOZTOWSKI-ANESTHESIA-ICD 10: I48.91;  Surgeon: Recoveryonly Surgery;  Location: SURGERY PRE-POST PROC UNIT [facility] [place];  Service:     INGUINAL HERNIA REPAIR  10/28/2009    Performed by ANGELI CHOI at SURGERY [facility] ORS    HERNIA REPAIR  2009    OTHER  1982    tonsillectomy    APPENDECTOMY      CATARACT EXTRACTION WITH IOL      bilateral       FAMILY HISTORY  Family History   Problem Relation Age of Onset    Heart Disease Neg Hx     Heart Failure Neg Hx        SOCIAL HISTORY   reports that he has never smoked. He has never used smokeless tobacco. He reports current alcohol use. He reports that he does not use drugs.    CURRENT MEDICATIONS  Previous Medications    APIXABAN (ELIQUIS) 5MG TAB    Take 1 Tablet by mouth 2 times a day.    METOPROLOL SR (TOPROL XL) 25 MG TABLET SR 24 HR    Take 1 Tablet by mouth every day.    MULTIPLE VITAMINS-MINERALS (OCUVITE PO)    Take  by mouth.    SIMVASTATIN (ZOCOR) 20 MG TAB    Take 1 Tab by mouth every evening.    TERAZOSIN (HYTRIN) 5 MG CAP    Take 5 mg by mouth every evening.       ALLERGIES  Patient has no known allergies.    PHYSICAL EXAM  BP (!) 151/71   Pulse 64   Temp 37.2 °C (98.9 °F) (Temporal)   Resp 18   Wt 90.7 kg (200 lb)   SpO2 94%   BMI 30.41 kg/m²   Constitutional: Patient is well developed, well nourished. Non-toxic appearing. Resting comfortably  HENT: Normocephalic, atraumatic.  Moist oral mucosa, nares are patent and clear.  Cardiovascular: Normal heart rate and Regular rhythm. No murmur,  Thorax & Lungs: Clear and equal breath sounds with good excursion. No respiratory distress, no rhonchi, wheezing or rales.   Abdomen: Bowel sounds normal in all four quadrants. Soft,nontender, no rebound , guarding, palpable masses.   Skin: Warm, Dry, No rashes.    Extremities: Peripheral pulses 4/4 No edema, No tenderness,     Neurologic: Alert &  oriented x 3, Normal motor function, Normal sensory function, No lateralizing or focal deficits noted. No facial droop, no slurred speech. Normal biceps-triceps, normal dorsi plantar flexion and extension.   Psychiatric: Affect normal, Judgment normal, Mood normal.     DIAGNOSTIC STUDIES & PROCEDURES    Labs:   Results for orders placed or performed during the hospital encounter of 04/16/24   COMP METABOLIC PANEL   Result Value Ref Range    Sodium 137 135 - 145 mmol/L    Potassium 3.8 3.6 - 5.5 mmol/L    Chloride 105 96 - 112 mmol/L    Co2 20 20 - 33 mmol/L    Anion Gap 12.0 7.0 - 16.0    Glucose 107 (H) 65 - 99 mg/dL    Bun 31 (H) 8 - 22 mg/dL    Creatinine 1.27 0.50 - 1.40 mg/dL    Calcium 9.2 8.4 - 10.2 mg/dL    Correct Calcium 9.2 8.5 - 10.5 mg/dL    AST(SGOT) 13 12 - 45 U/L    ALT(SGPT) 12 2 - 50 U/L    Alkaline Phosphatase 35 30 - 99 U/L    Total Bilirubin 0.4 0.1 - 1.5 mg/dL    Albumin 4.0 3.2 - 4.9 g/dL    Total Protein 6.3 6.0 - 8.2 g/dL    Globulin 2.3 1.9 - 3.5 g/dL    A-G Ratio 1.7 g/dL   TROPONIN   Result Value Ref Range    Troponin T 19 6 - 19 ng/L   APTT   Result Value Ref Range    APTT 29.9 24.7 - 36.0 sec   PROTHROMBIN TIME (INR)   Result Value Ref Range    PT 15.9 (H) 12.0 - 14.6 sec    INR 1.21 (H) 0.87 - 1.13   CBC WITH DIFFERENTIAL   Result Value Ref Range    WBC 15.5 (H) 4.8 - 10.8 K/uL    RBC 5.77 4.70 - 6.10 M/uL    Hemoglobin 11.6 (L) 14.0 - 18.0 g/dL    Hematocrit 36.9 (L) 42.0 - 52.0 %    MCV 64.0 (L) 81.4 - 97.8 fL    MCH 20.1 (L) 27.0 - 33.0 pg    MCHC 31.4 (L) 32.3 - 36.5 g/dL    RDW 34.7 (L) 35.9 - 50.0 fL    Platelet Count 202 164 - 446 K/uL    MPV 11.4 9.0 - 12.9 fL    Neutrophils-Polys 33.00 (L) 44.00 - 72.00 %    Lymphocytes 55.00 (H) 22.00 - 41.00 %    Monocytes 10.00 0.00 - 13.40 %    Eosinophils 2.00 0.00 - 6.90 %    Basophils 0.00 0.00 - 1.80 %    Nucleated RBC 0.00 0.00 - 0.20 /100 WBC    Neutrophils (Absolute) 5.12 1.82 - 7.42 K/uL    Lymphs (Absolute) 8.53 (H) 1.00 - 4.80  K/uL    Monos (Absolute) 1.55 (H) 0.00 - 0.85 K/uL    Eos (Absolute) 0.31 0.00 - 0.51 K/uL    Baso (Absolute) 0.00 0.00 - 0.12 K/uL    NRBC (Absolute) 0.00 K/uL    Anisocytosis 1+     Microcytosis 2+ (A)    ESTIMATED GFR   Result Value Ref Range    GFR (CKD-EPI) 57 (A) >60 mL/min/1.73 m 2   FREE THYROXINE   Result Value Ref Range    Free T-4 1.03 0.93 - 1.70 ng/dL   TSH   Result Value Ref Range    TSH 3.050 0.380 - 5.330 uIU/mL   URINALYSIS (UA)    Specimen: Urine   Result Value Ref Range    Color Yellow     Character Clear     Specific Gravity 1.015 <1.035    Ph 6.0 5.0 - 8.0    Glucose Negative Negative mg/dL    Ketones Negative Negative mg/dL    Protein Negative Negative mg/dL    Bilirubin Negative Negative    Nitrite Negative Negative    Leukocyte Esterase Trace (A) Negative    Occult Blood Negative Negative    Micro Urine Req Microscopic    DIFFERENTIAL MANUAL   Result Value Ref Range    Manual Diff Status PERFORMED    PLATELET ESTIMATE   Result Value Ref Range    Plt Estimation Normal    MORPHOLOGY   Result Value Ref Range    RBC Morphology Present     Large Platelets 1+     Reactive Lymphocytes Moderate    URINE MICROSCOPIC (W/UA)   Result Value Ref Range    WBC 0-2 (A) /hpf    RBC 0-2 (A) /hpf      Results for orders placed or performed during the hospital encounter of 04/16/24   COMP METABOLIC PANEL   Result Value Ref Range    Sodium 137 135 - 145 mmol/L    Potassium 3.8 3.6 - 5.5 mmol/L    Chloride 105 96 - 112 mmol/L    Co2 20 20 - 33 mmol/L    Anion Gap 12.0 7.0 - 16.0    Glucose 107 (H) 65 - 99 mg/dL    Bun 31 (H) 8 - 22 mg/dL    Creatinine 1.27 0.50 - 1.40 mg/dL    Calcium 9.2 8.4 - 10.2 mg/dL    Correct Calcium 9.2 8.5 - 10.5 mg/dL    AST(SGOT) 13 12 - 45 U/L    ALT(SGPT) 12 2 - 50 U/L    Alkaline Phosphatase 35 30 - 99 U/L    Total Bilirubin 0.4 0.1 - 1.5 mg/dL    Albumin 4.0 3.2 - 4.9 g/dL    Total Protein 6.3 6.0 - 8.2 g/dL    Globulin 2.3 1.9 - 3.5 g/dL    A-G Ratio 1.7 g/dL   TROPONIN   Result  Value Ref Range    Troponin T 19 6 - 19 ng/L   APTT   Result Value Ref Range    APTT 29.9 24.7 - 36.0 sec   PROTHROMBIN TIME (INR)   Result Value Ref Range    PT 15.9 (H) 12.0 - 14.6 sec    INR 1.21 (H) 0.87 - 1.13   CBC WITH DIFFERENTIAL   Result Value Ref Range    WBC 15.5 (H) 4.8 - 10.8 K/uL    RBC 5.77 4.70 - 6.10 M/uL    Hemoglobin 11.6 (L) 14.0 - 18.0 g/dL    Hematocrit 36.9 (L) 42.0 - 52.0 %    MCV 64.0 (L) 81.4 - 97.8 fL    MCH 20.1 (L) 27.0 - 33.0 pg    MCHC 31.4 (L) 32.3 - 36.5 g/dL    RDW 34.7 (L) 35.9 - 50.0 fL    Platelet Count 202 164 - 446 K/uL    MPV 11.4 9.0 - 12.9 fL    Neutrophils-Polys 33.00 (L) 44.00 - 72.00 %    Lymphocytes 55.00 (H) 22.00 - 41.00 %    Monocytes 10.00 0.00 - 13.40 %    Eosinophils 2.00 0.00 - 6.90 %    Basophils 0.00 0.00 - 1.80 %    Nucleated RBC 0.00 0.00 - 0.20 /100 WBC    Neutrophils (Absolute) 5.12 1.82 - 7.42 K/uL    Lymphs (Absolute) 8.53 (H) 1.00 - 4.80 K/uL    Monos (Absolute) 1.55 (H) 0.00 - 0.85 K/uL    Eos (Absolute) 0.31 0.00 - 0.51 K/uL    Baso (Absolute) 0.00 0.00 - 0.12 K/uL    NRBC (Absolute) 0.00 K/uL    Anisocytosis 1+     Microcytosis 2+ (A)    ESTIMATED GFR   Result Value Ref Range    GFR (CKD-EPI) 57 (A) >60 mL/min/1.73 m 2   FREE THYROXINE   Result Value Ref Range    Free T-4 1.03 0.93 - 1.70 ng/dL   TSH   Result Value Ref Range    TSH 3.050 0.380 - 5.330 uIU/mL   URINALYSIS (UA)    Specimen: Urine   Result Value Ref Range    Color Yellow     Character Clear     Specific Gravity 1.015 <1.035    Ph 6.0 5.0 - 8.0    Glucose Negative Negative mg/dL    Ketones Negative Negative mg/dL    Protein Negative Negative mg/dL    Bilirubin Negative Negative    Nitrite Negative Negative    Leukocyte Esterase Trace (A) Negative    Occult Blood Negative Negative    Micro Urine Req Microscopic    DIFFERENTIAL MANUAL   Result Value Ref Range    Manual Diff Status PERFORMED    PLATELET ESTIMATE   Result Value Ref Range    Plt Estimation Normal    MORPHOLOGY   Result Value  Ref Range    RBC Morphology Present     Large Platelets 1+     Reactive Lymphocytes Moderate    URINE MICROSCOPIC (W/UA)   Result Value Ref Range    WBC 0-2 (A) /hpf    RBC 0-2 (A) /hpf      All labs reviewed by me.    EKG:   I have independently interpreted this EKG     Radiology:   The attending Emergency Physician has independently interpreted the diagnostic imaging associated with this visit and is awaiting the final reading from the radiologist, which will be displayed below.  Preliminary interpretation is a follows: Chest x-ray has no acute cardiopulmonary disease, CT head shows no intracranial bleed or occlusion.  Radiologist interpretation:    CT-CTA HEAD WITH & W/O-POST PROCESS   Final Result         1.  No large vessel occlusion or aneurysm identified.      DX-CHEST-PORTABLE (1 VIEW)   Final Result      No evidence of acute cardiopulmonary process.      MR-BRAIN-W/O    (Results Pending)       COURSE & MEDICAL DECISION MAKING    ED Observation Status? No; Patient does not meet criteria for ED Observation.     INITIAL ASSESSMENT AND PLAN  Care Narrative:       6:55 PM - Patient seen and evaluated at bedside. He presents for dizziness that began earlier today, has been unable to get out of bed. No stoke like symptoms, there is some concern for cerebellar infarct. Discussed plan of care, including labs, imaging, medications and EKG. Patient agrees to plan of care. Patient will be treated with Antivert 50 mg for his symptoms. Ordered CTA-head w/ and w/o, DX-chest, CMP, troponin, APTT, CBC w/ diff, INR and EKG to evaluate. Differential diagnoses include but are not limited to: labyrinthitis, orthostatic hypotension, cerebellar infarct    ADDITIONAL PROBLEM LIST AND DISPOSITION  Atrial fibrillation               DISPOSITION AND DISCUSSIONS  I have discussed management of the patient with the following physicians and JADA's: Dr. Burciaga    Discussion of management with other [facility] or appropriate source(s): None      Barriers to care at this time, including but not limited to: None    Decision tools and prescription drugs considered including, but not limited to: Patient will be admitted into the hospital for further treatment for his dizziness including MRI in the morning to rule out cerebellar stroke.  He is currently in guarded condition..    FINAL IMPRESSION   1. Dizziness    2. Near syncope    3. Leukocytosis, unspecified type    4. History of atrial fibrillation         Sanchez CHATTERJEE (Clementinaibmatt), am scribing for, and in the presence of, Vernell Hagan D.O..    Electronically signed by: Sanchez Christianson (Clementinaibmatt), 4/16/2024    Vernell CHATTERJEE D.O. personally performed the services described in this documentation, as scribed by Sanchez Christianson in my presence, and it is both accurate and complete.    The note accurately reflects work and decisions made by me.  Vernell Hagan D.O.  4/16/2024  10:56 PM

## 2024-04-17 NOTE — ASSESSMENT & PLAN NOTE
WBC 14.2  Flow cytometry pending  No bone marrow biopsy recommended by Dr. Berry unless flow cytometry results are positive for lymphoma or hematologic condition  - Creatinine 1.63, LDH obtained today 316, elevated.

## 2024-04-17 NOTE — H&P
"Hospital Medicine History & Physical Note    Date of Service  4/16/2024    Primary Care Physician  Long Eddy D.O.    Consultants  None    Code Status  Full Code    Chief Complaint  Chief Complaint   Patient presents with    Dizziness     Woke up this am dizzy and lightheaded. Laid in bed all day. Pt gets more dizzy with movement. Denies neuro deficits, unilateral weakness, no recent falls, trauma or illness. Pt has had vertigo in past but this feels different per patient.        History of Presenting Illness  Hardy Roca is a 78 y.o. male with h/o Afib on Apixaban, who presented  to the ED on 4/16/2024 for evaluation of Dizziness. He first noticed symptoms when he woke up this morning. Describe symptoms as \" sensation of things spinning around me\". He was mostly in bed during the day because of it and reports falling (back to bed) while  attempting to go to the bathroom (no LOC, no head injury). He denies having headache. No focal weakness. He also denies vision abnormalities and no speech problems. He also denies fever, CP or other complaints.     I discussed the plan of care with patient and ERP Dr. Hagan .    Review of Systems  Review of Systems   Constitutional:  Positive for malaise/fatigue. Negative for fever.   HENT:  Negative for congestion and sore throat.    Eyes:  Negative for blurred vision and double vision.   Respiratory:  Negative for cough and shortness of breath.    Cardiovascular:  Negative for chest pain and palpitations.   Gastrointestinal:  Negative for nausea and vomiting.   Genitourinary:  Negative for dysuria and urgency.   Musculoskeletal:  Negative for myalgias and neck pain.   Skin:  Negative for itching and rash.   Neurological:  Positive for dizziness. Negative for weakness and headaches.   Endo/Heme/Allergies:  Does not bruise/bleed easily.   Psychiatric/Behavioral:  Negative for depression. The patient does not have insomnia.        Past Medical History   has a past " medical history of Atrial flutter (HCC), BPH (benign prostatic hypertrophy), CATARACT (2006, 2007), HLD (hyperlipidemia), and Unspecified urinary incontinence.    Surgical History   has a past surgical history that includes cataract extraction with iol; inguinal hernia repair (10/28/2009); other (1982); hernia repair (2009); appendectomy; and recovery (11/23/2015).     Family History  Reviewed and not pertinent  Family history reviewed with patient. There is no family history that is pertinent to the chief complaint.     Social History   reports that he has never smoked. He has never used smokeless tobacco. He reports current alcohol use. He reports that he does not use drugs.    Allergies  No Known Allergies    Medications  Prior to Admission Medications   Prescriptions Last Dose Informant Patient Reported? Taking?   Multiple Vitamins-Minerals (OCUVITE PO)   Yes No   Sig: Take  by mouth.   apixaban (ELIQUIS) 5mg Tab   No No   Sig: Take 1 Tablet by mouth 2 times a day.   metoprolol SR (TOPROL XL) 25 MG TABLET SR 24 HR   No No   Sig: Take 1 Tablet by mouth every day.   simvastatin (ZOCOR) 20 MG Tab   No No   Sig: Take 1 Tab by mouth every evening.   terazosin (HYTRIN) 5 MG Cap   Yes No   Sig: Take 5 mg by mouth every evening.      Facility-Administered Medications: None       Physical Exam  Temp:  [37.2 °C (98.9 °F)] 37.2 °C (98.9 °F)  Pulse:  [64-82] 81  Resp:  [18] 18  BP: (135-157)/(70-81) 156/81  SpO2:  [93 %-95 %] 95 %  Blood Pressure : (!) 156/81   Temperature: 37.2 °C (98.9 °F)   Pulse: 81   Respiration: 18   Pulse Oximetry: 95 %       Physical Exam  Constitutional:       Appearance: Normal appearance.   HENT:      Head: Normocephalic and atraumatic.      Nose: Nose normal.      Mouth/Throat:      Mouth: Mucous membranes are moist.      Pharynx: Oropharynx is clear.   Eyes:      Extraocular Movements: Extraocular movements intact.      Pupils: Pupils are equal, round, and reactive to light.   Cardiovascular:  "     Rate and Rhythm: Normal rate and regular rhythm.      Pulses: Normal pulses.      Heart sounds: Normal heart sounds.   Pulmonary:      Effort: Pulmonary effort is normal.      Breath sounds: Normal breath sounds.   Abdominal:      General: Abdomen is flat. Bowel sounds are normal.      Palpations: Abdomen is soft.   Musculoskeletal:      Cervical back: Normal range of motion and neck supple.   Skin:     General: Skin is warm and dry.   Neurological:      General: No focal deficit present.      Mental Status: He is alert and oriented to person, place, and time.      Cranial Nerves: Cranial nerve deficit present.      Coordination: Coordination normal.      Comments: Hand alternating movements and heel-to-toe exam within normal limits.  Noticed that somewhat disconjugated gaze with right eye tracking immediately most of the time.  Wife reports she is not sure if this is his baseline.    Psychiatric:         Mood and Affect: Mood normal.         Behavior: Behavior normal.         Laboratory:  Recent Labs     04/16/24 1924   WBC 15.5*   RBC 5.77   HEMOGLOBIN 11.6*   HEMATOCRIT 36.9*   MCV 64.0*   MCH 20.1*   MCHC 31.4*   RDW 34.7*   PLATELETCT 202   MPV 11.4     Recent Labs     04/16/24 1924   SODIUM 137   POTASSIUM 3.8   CHLORIDE 105   CO2 20   GLUCOSE 107*   BUN 31*   CREATININE 1.27   CALCIUM 9.2     Recent Labs     04/16/24 1924   ALTSGPT 12   ASTSGOT 13   ALKPHOSPHAT 35   TBILIRUBIN 0.4   GLUCOSE 107*     Recent Labs     04/16/24 1924   APTT 29.9   INR 1.21*     No results for input(s): \"NTPROBNP\" in the last 72 hours.      Recent Labs     04/16/24 1924   TROPONINT 19       Imaging:  CT-CTA HEAD WITH & W/O-POST PROCESS   Final Result         1.  No large vessel occlusion or aneurysm identified.      DX-CHEST-PORTABLE (1 VIEW)   Final Result      No evidence of acute cardiopulmonary process.          X-Ray:  I have personally reviewed the images and compared with prior images. and My impression is: CTA of " the head was negative for large vessel occlusion or aneurysm.  Chest x-ray was negative for acute cardiopulmonary process  EKG:  I have personally reviewed the images and compared with prior images. and My impression is: NSR at 67 bpm, no acute ST elevation    Assessment/Plan:  Justification for Admission Status  I anticipate this patient is appropriate for observation status at this time because 79 yo M, Dizziness        * Dizziness- (present on admission)  Assessment & Plan  -Observation status on telemetry floor  -CTA Head & Neck: negative for large vessel occlusion, aneurysm and actue intracraneal abnormalities  -Improved some with antivert, but still not able to walk well. Has has intermittent disconjugate gaze.  -MRI of the brain ordered  -Laboratory: Hemoglobin A1c, TSH, lipid panel  -Anticoagulated with Apixaban    Leukocytosis  Assessment & Plan  -WBC 1.5  -No other SIRS.   -U/A negative UTI, no Pneumonia COVID Viral panel negative. Uncler etilogy, unlikely infection.   -Added IVF and will recheck CBC in am       Microcytic anemia  Assessment & Plan  Hgb 11.6. No Bleeding. Monitor am CBC    Dyslipidemia- (present on admission)  Assessment & Plan  -Continue Simvastatin    Anticoagulated- (present on admission)  Assessment & Plan  -He is on Eliquis for atrial fibrillation.    Atrial flutter (HCC)- (present on admission)  Assessment & Plan  -Continue metoprolol and apixaban.        VTE prophylaxis: SCDs/TEDs

## 2024-04-17 NOTE — ASSESSMENT & PLAN NOTE
Had some improvement and had a positive vestibular sign on Omayra-Hallpike maneuver, status post Epley with [facility] PT and will have another 1 again today  No evidence of stroke on MRI, continue with home dose anticoagulation  Chronic microvascular changes and there is evidence of right frontoparietal siderosis, patient denies trauma to this area  Questionable occult infection given persistent leukocytosis without source, urinary would be the most likely, UA on 4/16 and 4/17 showed leuk esterase    Patient's dizziness still present  but may have been due to urinary retention issues as well.  Meclizine as needed

## 2024-04-17 NOTE — DISCHARGE PLANNING
PM&R referral from Dr. Neil Mckeon. Stroke protocol. Ongoing work up SCP provider. Potential spouse support. Physiatry consult pended at this time.

## 2024-04-17 NOTE — THERAPY
Speech Language Therapy Contact Note    Patient Name: Hardy Roca  Age:  78 y.o., Sex:  male  Medical Record #: [id number]  Today's Date: 4/17/2024    Discussed missed therapy with RN       04/17/24 8600   Initial Contact Note    Initial Contact Note  Order Received and Verified. Speech Therapy Evaluation NOT Completed Because Patient Does Not Require Acute Speech Therapy at this Time.   Interdisciplinary Plan of Care Collaboration   IDT Collaboration with  Nursing   Collaboration Comments This SLP attempted to see patient for CSE and cognitive evaluation. SLP will cancel orders. Please re-consult SLP if needed. Thank you.

## 2024-04-17 NOTE — ED TRIAGE NOTES
BIB ems for following complaints.     Chief Complaint   Patient presents with    Dizziness     Woke up this am dizzy and lightheaded. Laid in bed all day. Pt gets more dizzy with movement. Denies neuro deficits, unilateral weakness, no recent falls, trauma or illness. Pt has had vertigo in past but this feels different per patient.      BP (!) 151/71   Pulse 64   Temp 37.2 °C (98.9 °F) (Temporal)   Resp 18   Wt 90.7 kg (200 lb)   SpO2 94%   BMI 30.41 kg/m²

## 2024-04-17 NOTE — ED NOTES
Pt back from MRI   [No studies available for review at this time.] : No studies available for review at this time.

## 2024-04-17 NOTE — PROGRESS NOTES
"Hospital Medicine Daily Progress Note    Date of Service  4/17/2024    Chief Complaint  Hardy Roca is a 78 y.o. male admitted 4/16/2024 with dizziness    Hospital Course  As per chart review:  \"78 y.o. male with h/o Afib on Apixaban, who presented  to the ED on 4/16/2024 for evaluation of Dizziness. He first noticed symptoms when he woke up this morning. Describe symptoms as \" sensation of things spinning around me\". He was mostly in bed during the day because of it and reports falling (back to bed) while  attempting to go to the bathroom (no LOC, no head injury). He denies having headache. No focal weakness. He also denies vision abnormalities and no speech problems. He also denies fever, CP or other complaints.\"    Interval Problem Update  4/17: Patient seen at bedside this morning.  Patient lying in bed still complaining of dizziness.  After talking to the patient the patient mentions that his dizziness starts when he moves abruptly.  This sounds like vertigo.  However there is also concern for stroke. We are pending MRI, and echocardiogram. Continue to monitor on telemetry for now.    I have discussed this patient's plan of care and discharge plan at IDT rounds today with Case Management, Nursing, Nursing leadership, and other members of the IDT team.    Consultants/Specialty  None for now    Code Status  Full Code    Disposition  The patient is not medically cleared for discharge to home or a post-acute facility.        Review of Systems  Review of Systems   Constitutional:  Positive for malaise/fatigue. Negative for chills and fever.   HENT:  Negative for hearing loss and nosebleeds.    Eyes:  Negative for blurred vision and double vision.   Respiratory:  Negative for cough and shortness of breath.    Cardiovascular:  Negative for chest pain and palpitations.   Gastrointestinal:  Negative for abdominal pain, heartburn and vomiting.   Genitourinary:  Negative for dysuria and urgency.   Musculoskeletal:  " Negative for back pain and falls.   Skin:  Negative for itching and rash.   Neurological:  Positive for dizziness.   Psychiatric/Behavioral:  Negative for substance abuse. The patient is not nervous/anxious.    All other systems reviewed and are negative.       Physical Exam  Temp:  [37.2 °C (98.9 °F)] 37.2 °C (98.9 °F)  Pulse:  [56-82] 66  Resp:  [18] 18  BP: (135-157)/(69-81) 142/69  SpO2:  [92 %-95 %] 92 %    Physical Exam  Vitals and nursing note reviewed.   Constitutional:       Appearance: He is ill-appearing.   HENT:      Head: Normocephalic and atraumatic.      Right Ear: External ear normal.      Left Ear: External ear normal.      Nose: Nose normal.      Mouth/Throat:      Mouth: Mucous membranes are moist.      Pharynx: Oropharynx is clear.   Eyes:      General:         Right eye: No discharge.         Left eye: No discharge.      Extraocular Movements: Extraocular movements intact.      Pupils: Pupils are equal, round, and reactive to light.   Cardiovascular:      Rate and Rhythm: Normal rate and regular rhythm.      Heart sounds: No murmur heard.  Pulmonary:      Effort: Pulmonary effort is normal. No respiratory distress.      Breath sounds: Normal breath sounds.   Abdominal:      General: Abdomen is flat. Bowel sounds are normal. There is no distension.      Palpations: Abdomen is soft.      Tenderness: There is no abdominal tenderness.   Musculoskeletal:      Cervical back: Normal range of motion and neck supple.      Right lower leg: No edema.      Left lower leg: No edema.   Skin:     General: Skin is warm.   Neurological:      General: No focal deficit present.      Mental Status: He is alert and oriented to person, place, and time.   Psychiatric:         Mood and Affect: Mood normal.         Behavior: Behavior normal.         Fluids    Intake/Output Summary (Last 24 hours) at 4/17/2024 0927  Last data filed at 4/17/2024 0015  Gross per 24 hour   Intake 1000 ml   Output --   Net 1000 ml        Laboratory  Recent Labs     04/16/24 1924 04/17/24 0211   WBC 15.5* 17.2*   RBC 5.77 5.50   HEMOGLOBIN 11.6* 11.0*   HEMATOCRIT 36.9* 35.0*   MCV 64.0* 63.6*   MCH 20.1* 20.0*   MCHC 31.4* 31.4*   RDW 34.7* 34.8*   PLATELETCT 202 179   MPV 11.4 11.5     Recent Labs     04/16/24 1924 04/17/24 0211   SODIUM 137 135   POTASSIUM 3.8 3.9   CHLORIDE 105 104   CO2 20 21   GLUCOSE 107* 101*   BUN 31* 28*   CREATININE 1.27 1.27   CALCIUM 9.2 8.7     Recent Labs     04/16/24 1924   APTT 29.9   INR 1.21*               Imaging  CT-CTA HEAD WITH & W/O-POST PROCESS   Final Result         1.  No large vessel occlusion or aneurysm identified.      DX-CHEST-PORTABLE (1 VIEW)   Final Result      No evidence of acute cardiopulmonary process.      MR-BRAIN-W/O    (Results Pending)   EC-ECHOCARDIOGRAM COMPLETE W/O CONT    (Results Pending)        Assessment/Plan  * Dizziness- (present on admission)  Assessment & Plan  -Observation status on telemetry floor  -CTA Head & Neck: negative for large vessel occlusion, aneurysm and actue intracraneal abnormalities  -Improved some with antivert, but still not able to walk well. Has has intermittent disconjugate gaze.  -MRI of the brain ordered  -Laboratory: Hemoglobin A1c, TSH, lipid panel  -Anticoagulated with Apixaban    4/17: Concern for stroke versus vertigo.  We are pending MRI of the brain and we have ordered also echocardiogram. While we rule out stroke patient on aspirin and statin. For now we will continue to monitor on telemetry. Patient already on anticoagulation with apixaban.    Advance care planning  Assessment & Plan  4/17: I discussed with patient for at least 16 minutes further goals of care.  This included CODE STATUS which includes full code and DNR/DNI.  The patient like to be full code.  We also discussed further treatment goals.  The patient like to have full treatment options.  This includes invasive or noninvasive procedures as needed.  In the event that the  patient cannot make decisions for himself he would like his wife to make decisions for him.    Leukocytosis  Assessment & Plan  -WBC 1.5  -No other SIRS.   -U/A negative UTI, no Pneumonia COVID Viral panel negative. Uncler etilogy, unlikely infection.   -Added IVF and will recheck CBC in am    4/17: Unclear etiology at this point.  We will order blood cultures just in case.  We have also ordered procalcitonin.  At this point the patient remains afebrile, no signs of infection.  Will continue to monitor closely and repeat CBC tomorrow.       Microcytic anemia  Assessment & Plan  Hgb 11.6 on admission    4/17: We will order iron studies, Hb seems to be stable. No signs of overt bleeding.    Dyslipidemia- (present on admission)  Assessment & Plan  4/17: We have switched to atorvastatin concerned for stroke.    Anticoagulated- (present on admission)  Assessment & Plan  -He is on Eliquis for atrial fibrillation.    Atrial flutter (HCC)- (present on admission)  Assessment & Plan  -Continue metoprolol and apixaban.         VTE prophylaxis: Eliquis    I have performed a physical exam and reviewed and updated ROS and Plan today (4/17/2024). In review of yesterday's note (4/16/2024), there are no changes except as documented above.      I spend at least 51 minutes providing care for this patient.  This includes face-to-face interview, physical examination.  Review of lab work including CBC, BMP, A1c.  Review of imaging study including CT of the head.  Discussion with multidisciplinary team including case management, nursing staff and pharmacy.  Creating plan of care, reviewing orders.    Moreover, I discussed with patient for at least 16 minutes further goals of care.  This included CODE STATUS which includes full code and DNR/DNI.  The patient like to be full code.  We also discussed further treatment goals.  The patient like to have full treatment options.  This includes invasive or noninvasive procedures as needed.  In the  event that the patient cannot make decisions for himself he would like his wife to make decisions for him.

## 2024-04-17 NOTE — PROGRESS NOTES
Telemetry Shift Summary    Rhythm SR; 1st deg AVB  HR Range 60s  Ectopy R-PVC  Measurements 0.22/0.08/0.40        Normal Values  Rhythm SR  HR Range    Measurements 0.12-0.20 / 0.06-0.10  / 0.30-0.52

## 2024-04-17 NOTE — PROGRESS NOTES
4 Eyes Skin Assessment Completed by CHRISTOPHER Lopez and CHRISTOPHER Polanco.    Head WDL  Ears WDL  Nose WDL  Mouth WDL  Neck WDL  Breast/Chest left axilla raised mole  Shoulder Blades WDL  Spine WDL  (R) Arm/Elbow/Hand WDL  (L) Arm/Elbow/Hand Abrasion  Abdomen WDL  Groin WDL  Scrotum/Coccyx/Buttocks WDL  (R) Leg WDL  (L) Leg WDL  (R) Heel/Foot/Toe WDL  (L) Heel/Foot/Toe WDL          Devices In Places Tele Box      Interventions In Place Pressure Redistribution Mattress    Possible Skin Injury Yes    Pictures Uploaded Into Epic Yes  Wound Consult Placed N/A  RN Wound Prevention Protocol Ordered No

## 2024-04-17 NOTE — ED NOTES
Medication history reviewed with pt. Med rec is complete.  Allergies reviewed, per pt    Pt is not sure if he took his METOPROLOL SR 25MG yesterday.    Patient has not had any outpatient antibiotics in the last 30 days.    Pt is on anticoagulants, pt takes ELIQUIS 5MG.  Last dose was taken on 4/16/2024 @  0800.

## 2024-04-18 LAB
ANION GAP SERPL CALC-SCNC: 15 MMOL/L (ref 7–16)
APPEARANCE UR: CLEAR
B PARAP IS1001 DNA NPH QL NAA+NON-PROBE: NOT DETECTED
B PERT.PT PRMT NPH QL NAA+NON-PROBE: NOT DETECTED
BACTERIA #/AREA URNS HPF: NEGATIVE /HPF
BASOPHILS # BLD AUTO: 0 % (ref 0–1.8)
BASOPHILS # BLD: 0 K/UL (ref 0–0.12)
BILIRUB UR QL STRIP.AUTO: NEGATIVE
BUN SERPL-MCNC: 25 MG/DL (ref 8–22)
C PNEUM DNA NPH QL NAA+NON-PROBE: NOT DETECTED
CALCIUM SERPL-MCNC: 9.3 MG/DL (ref 8.4–10.2)
CHLORIDE SERPL-SCNC: 105 MMOL/L (ref 96–112)
CHOLEST SERPL-MCNC: 122 MG/DL (ref 100–199)
CO2 SERPL-SCNC: 17 MMOL/L (ref 20–33)
COLOR UR: YELLOW
CREAT SERPL-MCNC: 1.34 MG/DL (ref 0.5–1.4)
EOSINOPHIL # BLD AUTO: 0 K/UL (ref 0–0.51)
EOSINOPHIL NFR BLD: 0 % (ref 0–6.9)
EPI CELLS #/AREA URNS HPF: ABNORMAL /HPF
ERYTHROCYTE [DISTWIDTH] IN BLOOD BY AUTOMATED COUNT: 33.6 FL (ref 35.9–50)
FERRITIN SERPL-MCNC: 135 NG/ML (ref 22–322)
FLUAV RNA NPH QL NAA+NON-PROBE: NOT DETECTED
FLUBV RNA NPH QL NAA+NON-PROBE: NOT DETECTED
GFR SERPLBLD CREATININE-BSD FMLA CKD-EPI: 54 ML/MIN/1.73 M 2
GLUCOSE SERPL-MCNC: 112 MG/DL (ref 65–99)
GLUCOSE UR STRIP.AUTO-MCNC: NEGATIVE MG/DL
HADV DNA NPH QL NAA+NON-PROBE: NOT DETECTED
HCOV 229E RNA NPH QL NAA+NON-PROBE: NOT DETECTED
HCOV HKU1 RNA NPH QL NAA+NON-PROBE: NOT DETECTED
HCOV NL63 RNA NPH QL NAA+NON-PROBE: NOT DETECTED
HCOV OC43 RNA NPH QL NAA+NON-PROBE: NOT DETECTED
HCT VFR BLD AUTO: 39.3 % (ref 42–52)
HDLC SERPL-MCNC: 30 MG/DL
HGB BLD-MCNC: 12.4 G/DL (ref 14–18)
HMPV RNA NPH QL NAA+NON-PROBE: NOT DETECTED
HPIV1 RNA NPH QL NAA+NON-PROBE: NOT DETECTED
HPIV2 RNA NPH QL NAA+NON-PROBE: NOT DETECTED
HPIV3 RNA NPH QL NAA+NON-PROBE: NOT DETECTED
HPIV4 RNA NPH QL NAA+NON-PROBE: NOT DETECTED
IRON SATN MFR SERPL: 15 % (ref 15–55)
IRON SERPL-MCNC: 43 UG/DL (ref 50–180)
KETONES UR STRIP.AUTO-MCNC: NEGATIVE MG/DL
LDLC SERPL CALC-MCNC: 68 MG/DL
LEUKOCYTE ESTERASE UR QL STRIP.AUTO: NEGATIVE
LYMPHOCYTES # BLD AUTO: 8.32 K/UL (ref 1–4.8)
LYMPHOCYTES NFR BLD: 53 % (ref 22–41)
M PNEUMO DNA NPH QL NAA+NON-PROBE: NOT DETECTED
MAGNESIUM SERPL-MCNC: 2.1 MG/DL (ref 1.5–2.5)
MANUAL DIFF BLD: NORMAL
MCH RBC QN AUTO: 19.7 PG (ref 27–33)
MCHC RBC AUTO-ENTMCNC: 31.6 G/DL (ref 32.3–36.5)
MCV RBC AUTO: 62.5 FL (ref 81.4–97.8)
MICRO URNS: ABNORMAL
MONOCYTES # BLD AUTO: 1.1 K/UL (ref 0–0.85)
MONOCYTES NFR BLD AUTO: 7 % (ref 0–13.4)
MUCOUS THREADS #/AREA URNS HPF: ABNORMAL /HPF
NEUTROPHILS # BLD AUTO: 6.28 K/UL (ref 1.82–7.42)
NEUTROPHILS NFR BLD: 40 % (ref 44–72)
NITRITE UR QL STRIP.AUTO: NEGATIVE
NRBC # BLD AUTO: 0 K/UL
NRBC BLD-RTO: 0 /100 WBC (ref 0–0.2)
PH UR STRIP.AUTO: 6 [PH] (ref 5–8)
PLATELET # BLD AUTO: 233 K/UL (ref 164–446)
PLATELET BLD QL SMEAR: NORMAL
PMV BLD AUTO: 11.1 FL (ref 9–12.9)
POTASSIUM SERPL-SCNC: 4 MMOL/L (ref 3.6–5.5)
PROT UR QL STRIP: NEGATIVE MG/DL
RBC # BLD AUTO: 6.29 M/UL (ref 4.7–6.1)
RBC # URNS HPF: ABNORMAL /HPF
RBC BLD AUTO: NORMAL
RBC UR QL AUTO: ABNORMAL
RSV RNA NPH QL NAA+NON-PROBE: NOT DETECTED
RV+EV RNA NPH QL NAA+NON-PROBE: NOT DETECTED
SARS-COV-2 RNA NPH QL NAA+NON-PROBE: NOTDETECTED
SODIUM SERPL-SCNC: 137 MMOL/L (ref 135–145)
SP GR UR STRIP.AUTO: 1.02
TIBC SERPL-MCNC: 294 UG/DL (ref 250–450)
TRIGL SERPL-MCNC: 119 MG/DL (ref 0–149)
UIBC SERPL-MCNC: 251 UG/DL (ref 110–370)
WBC # BLD AUTO: 15.7 K/UL (ref 4.8–10.8)
WBC #/AREA URNS HPF: ABNORMAL /HPF

## 2024-04-18 PROCEDURE — A9270 NON-COVERED ITEM OR SERVICE: HCPCS | Performed by: HOSPITALIST

## 2024-04-18 PROCEDURE — 80048 BASIC METABOLIC PNL TOTAL CA: CPT

## 2024-04-18 PROCEDURE — 87581 M.PNEUMON DNA AMP PROBE: CPT

## 2024-04-18 PROCEDURE — 700102 HCHG RX REV CODE 250 W/ 637 OVERRIDE(OP): Performed by: HOSPITALIST

## 2024-04-18 PROCEDURE — 82728 ASSAY OF FERRITIN: CPT

## 2024-04-18 PROCEDURE — 87486 CHLMYD PNEUM DNA AMP PROBE: CPT

## 2024-04-18 PROCEDURE — A9270 NON-COVERED ITEM OR SERVICE: HCPCS | Performed by: INTERNAL MEDICINE

## 2024-04-18 PROCEDURE — 700102 HCHG RX REV CODE 250 W/ 637 OVERRIDE(OP): Performed by: INTERNAL MEDICINE

## 2024-04-18 PROCEDURE — 87633 RESP VIRUS 12-25 TARGETS: CPT

## 2024-04-18 PROCEDURE — 80061 LIPID PANEL: CPT

## 2024-04-18 PROCEDURE — 94760 N-INVAS EAR/PLS OXIMETRY 1: CPT

## 2024-04-18 PROCEDURE — 85007 BL SMEAR W/DIFF WBC COUNT: CPT

## 2024-04-18 PROCEDURE — 81001 URINALYSIS AUTO W/SCOPE: CPT

## 2024-04-18 PROCEDURE — 36415 COLL VENOUS BLD VENIPUNCTURE: CPT

## 2024-04-18 PROCEDURE — 85027 COMPLETE CBC AUTOMATED: CPT

## 2024-04-18 PROCEDURE — G0378 HOSPITAL OBSERVATION PER HR: HCPCS

## 2024-04-18 PROCEDURE — 83735 ASSAY OF MAGNESIUM: CPT

## 2024-04-18 PROCEDURE — 83550 IRON BINDING TEST: CPT

## 2024-04-18 PROCEDURE — 87798 DETECT AGENT NOS DNA AMP: CPT | Mod: 91

## 2024-04-18 PROCEDURE — 83540 ASSAY OF IRON: CPT

## 2024-04-18 PROCEDURE — 97530 THERAPEUTIC ACTIVITIES: CPT

## 2024-04-18 PROCEDURE — 0T9B70Z DRAINAGE OF BLADDER WITH DRAINAGE DEVICE, VIA NATURAL OR ARTIFICIAL OPENING: ICD-10-PCS | Performed by: HOSPITALIST

## 2024-04-18 PROCEDURE — 51798 US URINE CAPACITY MEASURE: CPT

## 2024-04-18 PROCEDURE — 99232 SBSQ HOSP IP/OBS MODERATE 35: CPT | Performed by: INTERNAL MEDICINE

## 2024-04-18 PROCEDURE — 700111 HCHG RX REV CODE 636 W/ 250 OVERRIDE (IP): Performed by: INTERNAL MEDICINE

## 2024-04-18 RX ORDER — TERAZOSIN 5 MG/1
10 CAPSULE ORAL EVERY EVENING
Status: DISCONTINUED | OUTPATIENT
Start: 2024-04-18 | End: 2024-04-24 | Stop reason: HOSPADM

## 2024-04-18 RX ORDER — MECLIZINE HYDROCHLORIDE 25 MG/1
25 TABLET ORAL 3 TIMES DAILY PRN
Status: DISCONTINUED | OUTPATIENT
Start: 2024-04-18 | End: 2024-04-24 | Stop reason: HOSPADM

## 2024-04-18 RX ORDER — CETIRIZINE HYDROCHLORIDE 10 MG/1
10 TABLET ORAL DAILY
Status: DISCONTINUED | OUTPATIENT
Start: 2024-04-18 | End: 2024-04-24 | Stop reason: HOSPADM

## 2024-04-18 RX ORDER — TAMSULOSIN HYDROCHLORIDE 0.4 MG/1
0.4 CAPSULE ORAL
Status: DISCONTINUED | OUTPATIENT
Start: 2024-04-18 | End: 2024-04-18

## 2024-04-18 RX ADMIN — APIXABAN 5 MG: 5 TABLET, FILM COATED ORAL at 04:57

## 2024-04-18 RX ADMIN — LABETALOL HYDROCHLORIDE 10 MG: 5 INJECTION, SOLUTION INTRAVENOUS at 01:27

## 2024-04-18 RX ADMIN — APIXABAN 5 MG: 5 TABLET, FILM COATED ORAL at 17:02

## 2024-04-18 RX ADMIN — DOCUSATE SODIUM 50MG AND SENNOSIDES 8.6MG 2 TABLET: 8.6; 5 TABLET, FILM COATED ORAL at 04:57

## 2024-04-18 RX ADMIN — CETIRIZINE HYDROCHLORIDE 10 MG: 10 TABLET, FILM COATED ORAL at 11:44

## 2024-04-18 RX ADMIN — ATORVASTATIN CALCIUM 40 MG: 40 TABLET, FILM COATED ORAL at 17:02

## 2024-04-18 RX ADMIN — TAMSULOSIN HYDROCHLORIDE 0.4 MG: 0.4 CAPSULE ORAL at 08:23

## 2024-04-18 RX ADMIN — TERAZOSIN 10 MG: 5 CAPSULE ORAL at 17:02

## 2024-04-18 RX ADMIN — MECLIZINE HYDROCHLORIDE 25 MG: 25 TABLET ORAL at 15:15

## 2024-04-18 ASSESSMENT — GAIT ASSESSMENTS
DISTANCE (FEET): 100
ASSISTIVE DEVICE: FRONT WHEEL WALKER
DEVIATION: STEP TO
GAIT LEVEL OF ASSIST: CONTACT GUARD ASSIST

## 2024-04-18 ASSESSMENT — PAIN DESCRIPTION - PAIN TYPE
TYPE: ACUTE PAIN
TYPE: ACUTE PAIN

## 2024-04-18 ASSESSMENT — ENCOUNTER SYMPTOMS
HEARTBURN: 0
ABDOMINAL PAIN: 0
SPUTUM PRODUCTION: 0
BLURRED VISION: 0
WEAKNESS: 0
CLAUDICATION: 0
FEVER: 0
INSOMNIA: 0
COUGH: 1
CONSTIPATION: 0
CHILLS: 0
HEADACHES: 0
DEPRESSION: 0
VOMITING: 0
PHOTOPHOBIA: 0
SPEECH CHANGE: 0
SHORTNESS OF BREATH: 0
SENSORY CHANGE: 0
DIZZINESS: 0
MEMORY LOSS: 1
MYALGIAS: 0
DIARRHEA: 0

## 2024-04-18 ASSESSMENT — FIBROSIS 4 INDEX: FIB4 SCORE: 1.27

## 2024-04-18 NOTE — PROGRESS NOTES
Patient continually getting up to try and void, complaints of pain in his bladder. Bladder scan done = 542 mL. Notified Dr. Burciaga, order placed for cox catheter. Patient tolerated cox insertion, immediately drained 700 mL out, repeat UA sent to lab.

## 2024-04-18 NOTE — PROGRESS NOTES
[facility] Medicine Daily Progress Note    Date of Service  4/18/2024    Chief Complaint  Hardy Roca is a 78 y.o. male admitted 4/16/2024 with dizziness.    Hospital Course  Patient is a 78-year-old male with A-fib on Eliquis who came in with dizziness.  Patient's symptoms were worse when he woke up the day of admission.  Felt the sensation of things spinning around him.  He stood up from the bed but fell back onto the bed secondary to the dizziness.  No headache weakness or visual abnormalities other than he closes his right eye when trying to focus.  Patient states he is unaware that he is closing his right eye.  Patient underwent MRI of the brain which showed no evidence of stroke but showed chronic microvascular changes and some siderosis on the right frontal parietal lobe.  Patient denies any injury to this area.  Patient was evaluated by physical therapy who was able to elicit a positive Omayra-Hallpike.  He underwent Epley maneuver both 4/17 and again today on 4/18.  Does have improvement of symptoms after this manipulation.  I will also start him on scheduled Zyrtec and as needed Antivert to see if symptoms improved.  He also had urinary retention as his terazosin was not initiated on admission and required Walden catheter.  Hopefully after 24 to 48 hours of terazosin he will no longer require catheterization.    Interval Problem Update  4/18 patient did intermittently feel better yesterday for short period of time after the Epley maneuver but states he feels about back to the same.  He still remains dizzy and he was up ambulating yesterday with bedside RN and front wheel walker and started leaning towards 1 side and was caught from falling.  PT worked with the patient again today with another Epley maneuver with improvement again in his symptoms.  I will start him also on Antivert as well as Zyrtec in case there is a seasonal allergy component to this.  I have also ordered respiratory viral PCR in case this  is occult viral infection as he has had a cough for approximately 1 month per patient's wife at bedside.    I have discussed this patient's plan of care and discharge plan at IDT rounds today with Case Management, Nursing, Nursing leadership, and other members of the IDT team.    Consultants/Specialty  none    Code Status  Full Code    Disposition  The patient is not medically cleared for discharge to home or a post-acute facility.  Anticipate discharge to: home with organized home healthcare and close outpatient follow-up    I have placed the appropriate orders for post-discharge needs.    Review of Systems  Review of Systems   Constitutional:  Negative for chills and fever.   HENT:  Negative for congestion.    Eyes:  Negative for blurred vision and photophobia.   Respiratory:  Positive for cough (Dry, intermittent). Negative for sputum production and shortness of breath.    Cardiovascular:  Negative for chest pain, claudication and leg swelling.   Gastrointestinal:  Negative for abdominal pain, constipation, diarrhea, heartburn and vomiting.   Genitourinary:  Negative for dysuria and hematuria.   Musculoskeletal:  Negative for joint pain and myalgias.   Skin:  Negative for itching and rash.   Neurological:  Negative for dizziness, sensory change, speech change, weakness and headaches.   Psychiatric/Behavioral:  Positive for memory loss (Patient having some confusion since hospitalization). Negative for depression. The patient does not have insomnia.         Physical Exam  Temp:  [36.6 °C (97.8 °F)-37.8 °C (100.1 °F)] 36.9 °C (98.4 °F)  Pulse:  [66-77] 69  Resp:  [18] 18  BP: (124-165)/(56-71) 124/65  SpO2:  [92 %-94 %] 92 %    Physical Exam  Vitals and nursing note reviewed.   Constitutional:       General: He is not in acute distress.     Appearance: Normal appearance.   HENT:      Head: Normocephalic and atraumatic.   Eyes:      General: No scleral icterus.     Extraocular Movements: Extraocular movements  intact.   Cardiovascular:      Rate and Rhythm: Normal rate and regular rhythm.      Pulses: Normal pulses.      Heart sounds: Normal heart sounds. No murmur heard.  Pulmonary:      Effort: Pulmonary effort is normal. No respiratory distress.      Breath sounds: Normal breath sounds. No wheezing, rhonchi or rales.   Abdominal:      General: Abdomen is flat. Bowel sounds are normal. There is no distension.      Palpations: Abdomen is soft.      Tenderness: There is no rebound.   Musculoskeletal:         General: No swelling or tenderness.      Cervical back: Normal range of motion and neck supple.   Lymphadenopathy:      Cervical: No cervical adenopathy.   Skin:     Coloration: Skin is not jaundiced.      Findings: No erythema.   Neurological:      General: No focal deficit present.      Mental Status: He is alert and oriented to person, place, and time. Mental status is at baseline.      Cranial Nerves: No cranial nerve deficit.      Comments: Oriented but slightly confused per wife at bedside   Psychiatric:         Mood and Affect: Mood normal.         Behavior: Behavior normal.         Fluids    Intake/Output Summary (Last 24 hours) at 4/18/2024 1530  Last data filed at 4/18/2024 1300  Gross per 24 hour   Intake 960 ml   Output 775 ml   Net 185 ml       Laboratory  Recent Labs     04/16/24 1924 04/17/24 0211 04/18/24  0115   WBC 15.5* 17.2* 15.7*   RBC 5.77 5.50 6.29*   HEMOGLOBIN 11.6* 11.0* 12.4*   HEMATOCRIT 36.9* 35.0* 39.3*   MCV 64.0* 63.6* 62.5*   MCH 20.1* 20.0* 19.7*   MCHC 31.4* 31.4* 31.6*   RDW 34.7* 34.8* 33.6*   PLATELETCT 202 179 233   MPV 11.4 11.5 11.1     Recent Labs     04/16/24 1924 04/17/24 0211 04/18/24  0115   SODIUM 137 135 137   POTASSIUM 3.8 3.9 4.0   CHLORIDE 105 104 105   CO2 20 21 17*   GLUCOSE 107* 101* 112*   BUN 31* 28* 25*   CREATININE 1.27 1.27 1.34   CALCIUM 9.2 8.7 9.3     Recent Labs     04/16/24 1924   APTT 29.9   INR 1.21*         Recent Labs     04/18/24  0115    TRIGLYCERIDE 119   HDL 30*   LDL 68       Imaging  MR-BRAIN-W/O   Final Result         Age-related volume loss and chronic microvascular ischemic changes.      Superficial siderosis noted in the right frontoparietal cortical surface. This could be related to remote intracranial traumatic injury or remote subarachnoid hemorrhage. Also, in the appropriate clinical setting, this can be seen with underlying amyloid    angiopathy.      EC-ECHOCARDIOGRAM COMPLETE W/O CONT   Final Result      CT-CTA HEAD WITH & W/O-POST PROCESS   Final Result         1.  No large vessel occlusion or aneurysm identified.      DX-CHEST-PORTABLE (1 VIEW)   Final Result      No evidence of acute cardiopulmonary process.           Assessment/Plan  * Dizziness- (present on admission)  Assessment & Plan  Had some improvement and had a positive vestibular sign on [place]-Hallpike maneuver, status post Epley with Hardy PT and will have another 1 again today  Start on Antivert Q8 and check respiratory viral PCR, start Zyrtec and reevaluate  -Anticoagulated with Apixaban  No evidence of stroke on MRI, continue with home dose anticoagulation  Chronic microvascular changes and there is evidence of right frontoparietal siderosis, patient denies trauma to this area      Advance care planning  Assessment & Plan  Patient wants to be full code    Leukocytosis  Assessment & Plan  -WBC 1.5  -No other SIRS.   -U/A negative UTI, no Pneumonia COVID Viral panel negative. Uncler etilogy, unlikely infection.     Blood cultures without growth  Respiratory viral PCR pending, cough for the last month  Could be related to urine retention as his terazosin was not reinitiated and Walden catheter had to be placed overnight  No obvious evidence of UTI on urinalysis       Microcytic anemia  Assessment & Plan  Hgb 11.6 on admission        Dyslipidemia- (present on admission)  Assessment & Plan  atorvastatin    Anticoagulated- (present on admission)  Assessment & Plan  -He is on  Eliquis for atrial fibrillation.    Atrial flutter (HCC)- (present on admission)  Assessment & Plan  -Continue metoprolol and apixaban.         VTE prophylaxis:    therapeutic anticoagulation with eliquis 5 mg BID      I have performed a physical exam and reviewed and updated ROS and Plan today (4/18/2024). In review of yesterday's note (4/17/2024), there are no changes except as documented above.

## 2024-04-18 NOTE — PROGRESS NOTES
Telemetry Shift Summary    Rhythm SR  HR Range 65-93  Ectopy R-O PVC; O-F PAC  Measurements 0.20/0.10/0.38        Normal Values  Rhythm SR  HR Range    Measurements 0.12-0.20 / 0.06-0.10  / 0.30-0.52

## 2024-04-18 NOTE — CARE PLAN
The patient is Stable - Low risk of patient condition declining or worsening    Shift Goals  Clinical Goals: Neuro checks, NIHSS.  Patient Goals: Go home    Progress made toward(s) clinical / shift goals:    Problem: Neuro Status  Goal: Neuro status will remain stable or improve  Outcome: Progressing  Note: No new deficits     Problem: Urinary Elimination  Goal: Establish and maintain regular urinary output  Outcome: Progressing  Note: Retention relieved with Walden catheter insertion.        Patient is not progressing towards the following goals:

## 2024-04-18 NOTE — THERAPY
Occupational Therapy Contact Note    Patient Name: Hardy Roca  Age:  78 y.o., Sex:  male  Medical Record #: [id number]  Today's Date: 4/18/2024 04/18/24 1201   Initial Contact Note    Initial Contact Note Order Received and Verified. Occupational Therapy Evaluation NOT Completed Because Patient Does Not Require Acute Occupational Therapy at this Time.   Interdisciplinary Plan of Care Collaboration   IDT Collaboration with  Physical Therapist;Physician   Collaboration Comments OT orders rec'd, based on pt's performance with PT and after speaking with Dr. Arevalo, pt does not appear he will need OT eval in this setting at this time.  Will defer OT eval to avoid over utilization of services.

## 2024-04-18 NOTE — DISCHARGE PLANNING
Follow up for post acute services Current documentation does not support therapy need for IRF level of care no physiatry consult ordered per protocol.

## 2024-04-18 NOTE — THERAPY
Physical Therapy   Daily Treatment     Patient Name: Hardy Roca  Age:  78 y.o., Sex:  male  Medical Record #: [id number]  Today's Date: 4/18/2024     Precautions  Precautions: Fall Risk  Comments: mod    Assessment    Epley maneuver to L post canal. Pt felt less dizzy and improved ambulation following.Would benefit from FWW at home.Wife advised she needs to be with him whilst ambulating    Plan    Treatment Plan Status:    Type of Treatment: (P) Neuro Re-Education / Balance, Gait Training, Therapeutic Activities, Therapeutic Exercise  Treatment Frequency: (P) Daily  Treatment Duration:      DC Equipment Recommendations: (P) Front-Wheel Walker  Discharge Recommendations:  (out Pt vestibular rehab if does not resolve)         Objective       04/18/24 1300   Charge Group   PT Therapeutic Activities (Units) 2   Supine Lower Body Exercise   Supine Lower Body Exercises Yes   Comments Epley   Balance   Sitting Balance (Static) Good   Sitting Balance (Dynamic) Fair +   Standing Balance (Static) Fair +   Standing Balance (Dynamic) Fair   Weight Shift Sitting Good   Weight Shift Standing Good   Bed Mobility    Supine to Sit Modified Independent   Sit to Supine Modified Independent   Scooting Modified Independent   Gait Analysis   Gait Level Of Assist Contact Guard Assist   Assistive Device Front Wheel Walker   Distance (Feet) 100   # of Times Distance was Traveled 2   Deviation Step To   Functional Mobility   Sit to Stand Standby Assist   Bed, Chair, Wheelchair Transfer Standby Assist   Toilet Transfers Standby Assist   Activity Tolerance   Sitting Edge of Bed 10   Standing 2 x 4 mins   Short Term Goals    Short Term Goal # 1 I with transfers in 4 V   Short Term Goal # 2 S with amb x 100 feet in 4 V   Physical Therapy Treatment Plan   Treatment Plan  Neuro Re-Education / Balance;Gait Training;Therapeutic Activities;Therapeutic Exercise   Treatment Frequency Daily   Anticipated Discharge Equipment and Recommendations   DC  Equipment Recommendations Front-Wheel Walker   Interdisciplinary Plan of Care Collaboration   IDT Collaboration with  Nursing   Session Information   Date / Session Number  4/18-2  2/7 4/23

## 2024-04-18 NOTE — CARE PLAN
The patient is Stable - Low risk of patient condition declining or worsening    Shift Goals  Clinical Goals: Neuro checks, NIHSS  Patient Goals: Go home    Progress made toward(s) clinical / shift goals:        Problem: Neuro Status  Goal: Neuro status will remain stable or improve  Outcome: Progressing   Neuro checks and NIHSS completed, patient remains A&Ox3, occasional confusion.   Problem: Fall Risk  Goal: Patient will remain free from falls  Outcome: Progressing   Patient still reports dizziness when getting up, ambulates x1 assist with FWW, bed alarms in place, educated on use of call light.     Patient is not progressing towards the following goals:

## 2024-04-18 NOTE — HOSPITAL COURSE
Patient is a 78-year-old male with A-fib on Eliquis who came in with dizziness.  Patient's symptoms were worse when he woke up the day of admission.  Felt the sensation of things spinning around him.  He stood up from the bed but fell back onto the bed secondary to the dizziness.  No headache weakness or visual abnormalities other than he closes his right eye when trying to focus.  Patient states he is unaware that he is closing his right eye.  Patient underwent MRI of the brain which showed no evidence of stroke but showed chronic microvascular changes and some siderosis on the right frontal parietal lobe.  Patient denies any injury to this area.  Patient was evaluated by physical therapy who was able to elicit a positive Omayra-Hallpike.  He underwent Epley maneuver both 4/17 and again today on 4/18.  Does have improvement of symptoms after this manipulation.  I will also start him on scheduled Zyrtec and as needed Antivert to see if symptoms improved.  He also had urinary retention as his terazosin was not initiated on admission and required Walden catheter.  Hopefully after 24 to 48 hours of terazosin he will no longer require catheterization.

## 2024-04-18 NOTE — PROGRESS NOTES
Telemetry Shift Summary     Rhythm: SR/ST  HR:   Ectopy: o-f PAC, r PVC    Measurements: 0.18/0.08/0.40    Normal Values  Rhythm: SR  HR:   Measurements: 0.12-0.20/0.08-0.10/0.30-0.52

## 2024-04-19 LAB
ANION GAP SERPL CALC-SCNC: 12 MMOL/L (ref 7–16)
BASOPHILS # BLD AUTO: 0 % (ref 0–1.8)
BASOPHILS # BLD: 0 K/UL (ref 0–0.12)
BUN SERPL-MCNC: 25 MG/DL (ref 8–22)
CALCIUM SERPL-MCNC: 8.9 MG/DL (ref 8.4–10.2)
CHLORIDE SERPL-SCNC: 105 MMOL/L (ref 96–112)
CO2 SERPL-SCNC: 19 MMOL/L (ref 20–33)
CREAT SERPL-MCNC: 1.33 MG/DL (ref 0.5–1.4)
EOSINOPHIL # BLD AUTO: 0.18 K/UL (ref 0–0.51)
EOSINOPHIL NFR BLD: 1 % (ref 0–6.9)
ERYTHROCYTE [DISTWIDTH] IN BLOOD BY AUTOMATED COUNT: 34.5 FL (ref 35.9–50)
GFR SERPLBLD CREATININE-BSD FMLA CKD-EPI: 54 ML/MIN/1.73 M 2
GLUCOSE SERPL-MCNC: 107 MG/DL (ref 65–99)
HCT VFR BLD AUTO: 36.2 % (ref 42–52)
HGB BLD-MCNC: 11.4 G/DL (ref 14–18)
LYMPHOCYTES # BLD AUTO: 13.21 K/UL (ref 1–4.8)
LYMPHOCYTES NFR BLD: 73 % (ref 22–41)
MANUAL DIFF BLD: NORMAL
MCH RBC QN AUTO: 20.1 PG (ref 27–33)
MCHC RBC AUTO-ENTMCNC: 31.5 G/DL (ref 32.3–36.5)
MCV RBC AUTO: 63.8 FL (ref 81.4–97.8)
MONOCYTES # BLD AUTO: 0.18 K/UL (ref 0–0.85)
MONOCYTES NFR BLD AUTO: 1 % (ref 0–13.4)
NEUTROPHILS # BLD AUTO: 4.53 K/UL (ref 1.82–7.42)
NEUTROPHILS NFR BLD: 25 % (ref 44–72)
NRBC # BLD AUTO: 0 K/UL
NRBC BLD-RTO: 0 /100 WBC (ref 0–0.2)
PLATELET # BLD AUTO: 206 K/UL (ref 164–446)
PLATELET BLD QL SMEAR: NORMAL
PMV BLD AUTO: 11.3 FL (ref 9–12.9)
POTASSIUM SERPL-SCNC: 4.1 MMOL/L (ref 3.6–5.5)
RBC # BLD AUTO: 5.67 M/UL (ref 4.7–6.1)
RBC BLD AUTO: NORMAL
SODIUM SERPL-SCNC: 136 MMOL/L (ref 135–145)
VARIANT LYMPHS BLD QL SMEAR: NORMAL
WBC # BLD AUTO: 18.1 K/UL (ref 4.8–10.8)

## 2024-04-19 PROCEDURE — 85027 COMPLETE CBC AUTOMATED: CPT

## 2024-04-19 PROCEDURE — A9270 NON-COVERED ITEM OR SERVICE: HCPCS | Performed by: INTERNAL MEDICINE

## 2024-04-19 PROCEDURE — 80048 BASIC METABOLIC PNL TOTAL CA: CPT

## 2024-04-19 PROCEDURE — 700102 HCHG RX REV CODE 250 W/ 637 OVERRIDE(OP): Performed by: INTERNAL MEDICINE

## 2024-04-19 PROCEDURE — A9270 NON-COVERED ITEM OR SERVICE: HCPCS | Performed by: HOSPITALIST

## 2024-04-19 PROCEDURE — 700102 HCHG RX REV CODE 250 W/ 637 OVERRIDE(OP): Performed by: HOSPITALIST

## 2024-04-19 PROCEDURE — 700105 HCHG RX REV CODE 258: Performed by: INTERNAL MEDICINE

## 2024-04-19 PROCEDURE — 99232 SBSQ HOSP IP/OBS MODERATE 35: CPT | Performed by: INTERNAL MEDICINE

## 2024-04-19 PROCEDURE — 85007 BL SMEAR W/DIFF WBC COUNT: CPT

## 2024-04-19 PROCEDURE — 36415 COLL VENOUS BLD VENIPUNCTURE: CPT

## 2024-04-19 PROCEDURE — 94760 N-INVAS EAR/PLS OXIMETRY 1: CPT

## 2024-04-19 PROCEDURE — 770020 HCHG ROOM/CARE - TELE (206)

## 2024-04-19 PROCEDURE — 700111 HCHG RX REV CODE 636 W/ 250 OVERRIDE (IP): Performed by: INTERNAL MEDICINE

## 2024-04-19 RX ADMIN — CEFAZOLIN 2 G: 2 INJECTION, POWDER, FOR SOLUTION INTRAMUSCULAR; INTRAVENOUS at 15:29

## 2024-04-19 RX ADMIN — CETIRIZINE HYDROCHLORIDE 10 MG: 10 TABLET, FILM COATED ORAL at 04:07

## 2024-04-19 RX ADMIN — DOCUSATE SODIUM 50MG AND SENNOSIDES 8.6MG 2 TABLET: 8.6; 5 TABLET, FILM COATED ORAL at 04:07

## 2024-04-19 RX ADMIN — APIXABAN 5 MG: 5 TABLET, FILM COATED ORAL at 04:07

## 2024-04-19 RX ADMIN — ATORVASTATIN CALCIUM 40 MG: 40 TABLET, FILM COATED ORAL at 18:05

## 2024-04-19 RX ADMIN — CEFAZOLIN 2 G: 2 INJECTION, POWDER, FOR SOLUTION INTRAMUSCULAR; INTRAVENOUS at 21:32

## 2024-04-19 RX ADMIN — TERAZOSIN 10 MG: 5 CAPSULE ORAL at 18:05

## 2024-04-19 RX ADMIN — APIXABAN 5 MG: 5 TABLET, FILM COATED ORAL at 18:05

## 2024-04-19 ASSESSMENT — ENCOUNTER SYMPTOMS
INSOMNIA: 0
FEVER: 0
DIARRHEA: 0
PHOTOPHOBIA: 0
DEPRESSION: 0
SPEECH CHANGE: 0
COUGH: 0
CONSTIPATION: 0
DIZZINESS: 0
SPUTUM PRODUCTION: 0
BLURRED VISION: 0
CHILLS: 0
MEMORY LOSS: 1
WEAKNESS: 0
SHORTNESS OF BREATH: 0
HEADACHES: 0
ABDOMINAL PAIN: 0
MYALGIAS: 0
HEARTBURN: 0
SENSORY CHANGE: 0
CLAUDICATION: 0
VOMITING: 0

## 2024-04-19 ASSESSMENT — PAIN DESCRIPTION - PAIN TYPE: TYPE: ACUTE PAIN

## 2024-04-19 ASSESSMENT — FIBROSIS 4 INDEX: FIB4 SCORE: 1.44

## 2024-04-19 NOTE — PROGRESS NOTES
1905 Received report from John RN at pt bedside. Completed NIHSS. Call light and belongings within reach. Bed locked and in low position. Alarm on and fall precautions in place.        2133 Pt assessment complete. Pt denies pain or discomfort. Discussed neuro checks, labs, & use of call light. All pt questions & needs addressed at this time.

## 2024-04-19 NOTE — PROGRESS NOTES
Telemetry Shift Summary    Rhythm SR  HR Range 69-97  Ectopy R-O PAC               R PVC  Measurements  0.22/0.08/0.38    Per monitor tech, John    Normal Values  Rhythm SR  HR Range   Measurements 0.12-0.20 / 0.06-0.10 / 0.30-0.52

## 2024-04-19 NOTE — CARE PLAN
The patient is Stable - Low risk of patient condition declining or worsening    Shift Goals  Clinical Goals: Q4 neuro, NIHSS  Patient Goals: sleep    Progress made toward(s) clinical / shift goals:    Problem: Neuro Status  Goal: Neuro status will remain stable or improve  Outcome: Progressing  Flowsheets (Taken 4/18/2024 6068)  Level of Consciousness: Alert  Note: Q4 neuro & Qshift NIHSS in place. Pt doesn't show worsening stroke symptoms.

## 2024-04-20 LAB
ANION GAP SERPL CALC-SCNC: 13 MMOL/L (ref 7–16)
BUN SERPL-MCNC: 30 MG/DL (ref 8–22)
CALCIUM SERPL-MCNC: 9.1 MG/DL (ref 8.4–10.2)
CHLORIDE SERPL-SCNC: 103 MMOL/L (ref 96–112)
CO2 SERPL-SCNC: 21 MMOL/L (ref 20–33)
CREAT SERPL-MCNC: 1.66 MG/DL (ref 0.5–1.4)
ERYTHROCYTE [DISTWIDTH] IN BLOOD BY AUTOMATED COUNT: 35.2 FL (ref 35.9–50)
GFR SERPLBLD CREATININE-BSD FMLA CKD-EPI: 42 ML/MIN/1.73 M 2
GLUCOSE SERPL-MCNC: 98 MG/DL (ref 65–99)
HCT VFR BLD AUTO: 36.5 % (ref 42–52)
HGB BLD-MCNC: 11.3 G/DL (ref 14–18)
MCH RBC QN AUTO: 19.8 PG (ref 27–33)
MCHC RBC AUTO-ENTMCNC: 31 G/DL (ref 32.3–36.5)
MCV RBC AUTO: 64 FL (ref 81.4–97.8)
PLATELET # BLD AUTO: 200 K/UL (ref 164–446)
PMV BLD AUTO: 11.3 FL (ref 9–12.9)
POTASSIUM SERPL-SCNC: 4.1 MMOL/L (ref 3.6–5.5)
RBC # BLD AUTO: 5.7 M/UL (ref 4.7–6.1)
SODIUM SERPL-SCNC: 137 MMOL/L (ref 135–145)
WBC # BLD AUTO: 18.6 K/UL (ref 4.8–10.8)

## 2024-04-20 PROCEDURE — A9270 NON-COVERED ITEM OR SERVICE: HCPCS | Performed by: INTERNAL MEDICINE

## 2024-04-20 PROCEDURE — 700111 HCHG RX REV CODE 636 W/ 250 OVERRIDE (IP): Performed by: INTERNAL MEDICINE

## 2024-04-20 PROCEDURE — 700111 HCHG RX REV CODE 636 W/ 250 OVERRIDE (IP): Performed by: HOSPITALIST

## 2024-04-20 PROCEDURE — 94760 N-INVAS EAR/PLS OXIMETRY 1: CPT

## 2024-04-20 PROCEDURE — 700102 HCHG RX REV CODE 250 W/ 637 OVERRIDE(OP): Performed by: INTERNAL MEDICINE

## 2024-04-20 PROCEDURE — 700101 HCHG RX REV CODE 250: Performed by: HOSPITALIST

## 2024-04-20 PROCEDURE — 93005 ELECTROCARDIOGRAM TRACING: CPT | Performed by: HOSPITALIST

## 2024-04-20 PROCEDURE — 700105 HCHG RX REV CODE 258: Performed by: INTERNAL MEDICINE

## 2024-04-20 PROCEDURE — 36415 COLL VENOUS BLD VENIPUNCTURE: CPT

## 2024-04-20 PROCEDURE — 85027 COMPLETE CBC AUTOMATED: CPT

## 2024-04-20 PROCEDURE — 700105 HCHG RX REV CODE 258: Performed by: HOSPITALIST

## 2024-04-20 PROCEDURE — 80048 BASIC METABOLIC PNL TOTAL CA: CPT

## 2024-04-20 PROCEDURE — A9270 NON-COVERED ITEM OR SERVICE: HCPCS | Performed by: HOSPITALIST

## 2024-04-20 PROCEDURE — 700102 HCHG RX REV CODE 250 W/ 637 OVERRIDE(OP): Performed by: HOSPITALIST

## 2024-04-20 PROCEDURE — 770020 HCHG ROOM/CARE - TELE (206)

## 2024-04-20 PROCEDURE — 99233 SBSQ HOSP IP/OBS HIGH 50: CPT | Performed by: INTERNAL MEDICINE

## 2024-04-20 RX ORDER — DIGOXIN 125 MCG
125 TABLET ORAL DAILY
Status: DISCONTINUED | OUTPATIENT
Start: 2024-04-21 | End: 2024-04-20

## 2024-04-20 RX ORDER — METOPROLOL TARTRATE 1 MG/ML
5 INJECTION, SOLUTION INTRAVENOUS ONCE
Status: DISCONTINUED | OUTPATIENT
Start: 2024-04-20 | End: 2024-04-20

## 2024-04-20 RX ORDER — SODIUM CHLORIDE 9 MG/ML
500 INJECTION, SOLUTION INTRAVENOUS ONCE
Status: COMPLETED | OUTPATIENT
Start: 2024-04-20 | End: 2024-04-20

## 2024-04-20 RX ORDER — METOPROLOL TARTRATE 1 MG/ML
5 INJECTION, SOLUTION INTRAVENOUS ONCE
Status: COMPLETED | OUTPATIENT
Start: 2024-04-20 | End: 2024-04-20

## 2024-04-20 RX ORDER — METOPROLOL SUCCINATE 25 MG/1
25 TABLET, EXTENDED RELEASE ORAL
Status: DISCONTINUED | OUTPATIENT
Start: 2024-04-20 | End: 2024-04-24 | Stop reason: HOSPADM

## 2024-04-20 RX ORDER — DIGOXIN 0.25 MG/ML
250 INJECTION INTRAMUSCULAR; INTRAVENOUS EVERY 6 HOURS
Status: DISCONTINUED | OUTPATIENT
Start: 2024-04-20 | End: 2024-04-20

## 2024-04-20 RX ORDER — METOPROLOL SUCCINATE 25 MG/1
25 TABLET, EXTENDED RELEASE ORAL ONCE
Status: COMPLETED | OUTPATIENT
Start: 2024-04-20 | End: 2024-04-20

## 2024-04-20 RX ORDER — DEXTROSE MONOHYDRATE 50 MG/ML
INJECTION, SOLUTION INTRAVENOUS CONTINUOUS
Status: DISCONTINUED | OUTPATIENT
Start: 2024-04-20 | End: 2024-04-21

## 2024-04-20 RX ADMIN — SODIUM CHLORIDE 500 ML: 9 INJECTION, SOLUTION INTRAVENOUS at 04:40

## 2024-04-20 RX ADMIN — ATORVASTATIN CALCIUM 40 MG: 40 TABLET, FILM COATED ORAL at 17:24

## 2024-04-20 RX ADMIN — CEFTRIAXONE SODIUM 2000 MG: 2 INJECTION, POWDER, FOR SOLUTION INTRAMUSCULAR; INTRAVENOUS at 11:32

## 2024-04-20 RX ADMIN — DIGOXIN 250 MCG: 250 INJECTION, SOLUTION INTRAMUSCULAR; INTRAVENOUS; PARENTERAL at 10:37

## 2024-04-20 RX ADMIN — APIXABAN 5 MG: 5 TABLET, FILM COATED ORAL at 17:22

## 2024-04-20 RX ADMIN — SODIUM CHLORIDE 500 ML: 9 INJECTION, SOLUTION INTRAVENOUS at 02:09

## 2024-04-20 RX ADMIN — CEFAZOLIN 2 G: 2 INJECTION, POWDER, FOR SOLUTION INTRAMUSCULAR; INTRAVENOUS at 06:12

## 2024-04-20 RX ADMIN — DIGOXIN 250 MCG: 250 INJECTION, SOLUTION INTRAMUSCULAR; INTRAVENOUS; PARENTERAL at 17:22

## 2024-04-20 RX ADMIN — AMIODARONE HYDROCHLORIDE 1 MG/MIN: 1.8 INJECTION, SOLUTION INTRAVENOUS at 18:32

## 2024-04-20 RX ADMIN — TERAZOSIN 10 MG: 5 CAPSULE ORAL at 17:23

## 2024-04-20 RX ADMIN — APIXABAN 5 MG: 5 TABLET, FILM COATED ORAL at 05:38

## 2024-04-20 RX ADMIN — DEXTROSE MONOHYDRATE: 50 INJECTION, SOLUTION INTRAVENOUS at 18:37

## 2024-04-20 RX ADMIN — AMIODARONE HYDROCHLORIDE 150 MG: 1.5 INJECTION, SOLUTION INTRAVENOUS at 05:38

## 2024-04-20 RX ADMIN — METOPROLOL SUCCINATE 25 MG: 25 TABLET, EXTENDED RELEASE ORAL at 11:38

## 2024-04-20 RX ADMIN — CETIRIZINE HYDROCHLORIDE 10 MG: 10 TABLET, FILM COATED ORAL at 05:38

## 2024-04-20 RX ADMIN — METOPROLOL TARTRATE 5 MG: 5 INJECTION INTRAVENOUS at 02:56

## 2024-04-20 ASSESSMENT — ENCOUNTER SYMPTOMS
VOMITING: 0
DIZZINESS: 0
INSOMNIA: 1
CLAUDICATION: 0
SHORTNESS OF BREATH: 0
DEPRESSION: 0
HEARTBURN: 0
HEADACHES: 0
FEVER: 0
WEAKNESS: 0
PHOTOPHOBIA: 0
CONSTIPATION: 0
BLURRED VISION: 0
ABDOMINAL PAIN: 0
SPUTUM PRODUCTION: 0
MEMORY LOSS: 1
COUGH: 0
MYALGIAS: 0
CHILLS: 0
SENSORY CHANGE: 0
SPEECH CHANGE: 0
DIARRHEA: 0

## 2024-04-20 ASSESSMENT — PAIN DESCRIPTION - PAIN TYPE: TYPE: ACUTE PAIN

## 2024-04-20 NOTE — PROGRESS NOTES
Telemetry Shift Summary    Rhythm SR/ST/Afib  HR Range   Ectopy R PVC               O-F PAC  Measurements  -/0.08/-    Normal Values  Rhythm SR  HR Range   Measurements 0.12-0.20 / 0.06-0.10 / 0.30-0.52

## 2024-04-20 NOTE — PROGRESS NOTES
[facility] Medicine Daily Progress Note    Date of Service  4/20/2024    Chief Complaint  Hardy Roca is a 78 y.o. male admitted 4/16/2024 with dizziness.    Hospital Course  Patient is a 78-year-old male with A-fib on Eliquis who came in with dizziness.  Patient's symptoms were worse when he woke up the day of admission.  Felt the sensation of things spinning around him.  He stood up from the bed but fell back onto the bed secondary to the dizziness.  No headache weakness or visual abnormalities other than he closes his right eye when trying to focus.  Patient states he is unaware that he is closing his right eye.  Patient underwent MRI of the brain which showed no evidence of stroke but showed chronic microvascular changes and some siderosis on the right frontal parietal lobe.  Patient denies any injury to this area.  Patient was evaluated by physical therapy who was able to elicit a positive Omayra-Hallpike.  He underwent Epley maneuver both 4/17 and again today on 4/18.  Does have improvement of symptoms after this manipulation.  I will also start him on scheduled Zyrtec and as needed Antivert to see if symptoms improved.  He also had urinary retention as his terazosin was not initiated on admission and required Walden catheter.  Hopefully after 24 to 48 hours of terazosin he will no longer require catheterization.    Interval Problem Update  4/18 patient did intermittently feel better yesterday for short period of time after the Epley maneuver but states he feels about back to the same.  He still remains dizzy and he was up ambulating yesterday with bedside RN and front wheel walker and started leaning towards 1 side and was caught from falling.  PT worked with the patient again today with another Epley maneuver with improvement again in his symptoms.  I will start him also on Antivert as well as Zyrtec in case there is a seasonal allergy component to this.  I have also ordered respiratory viral PCR in case this  is occult viral infection as he has had a cough for approximately 1 month per patient's wife at bedside.  4/19 patient is showing improvements today.  He does still have some lightheadedness but the dizziness seems to have improved with the second Epley maneuver done yesterday.  WBC count is still elevated.  Pro-Benny is normal at 0.07 however given the leukocytosis and the intermittent confusion I am concerned that there is occult infection, will trial Ancef to see if this makes any difference.  Viral PCR is negative.  4/20 patient went into RVR overnight, received IV metoprolol, fluid bolus, IV amiodarone but minimal improvement.  Started on IV digoxin this morning and given metoprolol XL, heart rate is improving.  WBC count continues to be elevated, no change with Ancef, DC and trial Rocephin.  Creatinine slightly worse today at 1.66.  As patient had poor sleep overnight his mentation is worse again today.  His heart rate is under good control patient will be able to get up and see how his dizziness has changed.  If recurrence of RVR the patient was started on amiodarone drip.  He had    I have discussed this patient's plan of care and discharge plan at IDT rounds today with Case Management, Nursing, Nursing leadership, and other members of the IDT team.    Consultants/Specialty  none    Code Status  Full Code    Disposition  The patient is not medically cleared for discharge to home or a post-acute facility.  Anticipate discharge to: home with organized home healthcare and close outpatient follow-up    I have placed the appropriate orders for post-discharge needs.    Review of Systems  Review of Systems   Constitutional:  Negative for chills and fever.   HENT:  Negative for congestion.    Eyes:  Negative for blurred vision and photophobia.   Respiratory:  Negative for cough, sputum production and shortness of breath.    Cardiovascular:  Negative for chest pain, claudication and leg swelling.   Gastrointestinal:   Negative for abdominal pain, constipation, diarrhea, heartburn and vomiting.   Genitourinary:  Negative for dysuria and hematuria.   Musculoskeletal:  Negative for joint pain and myalgias.   Skin:  Negative for itching and rash.   Neurological:  Negative for dizziness, sensory change, speech change, weakness and headaches.   Psychiatric/Behavioral:  Positive for memory loss (Worse today). Negative for depression. The patient has insomnia.         Physical Exam  Temp:  [36.4 °C (97.5 °F)-37.1 °C (98.7 °F)] 36.7 °C (98 °F)  Pulse:  [] 148  Resp:  [18-20] 18  BP: ()/(51-98) 119/73  SpO2:  [91 %-96 %] 96 %    Physical Exam  Vitals and nursing note reviewed.   Constitutional:       General: He is not in acute distress.     Appearance: Normal appearance.   HENT:      Head: Normocephalic and atraumatic.   Eyes:      General: No scleral icterus.     Extraocular Movements: Extraocular movements intact.   Cardiovascular:      Rate and Rhythm: Tachycardia present. Rhythm irregular.      Pulses: Normal pulses.      Heart sounds: Normal heart sounds. No murmur heard.  Pulmonary:      Effort: Pulmonary effort is normal. No respiratory distress.      Breath sounds: Normal breath sounds. No wheezing, rhonchi or rales.   Abdominal:      General: Abdomen is flat. Bowel sounds are normal. There is no distension.      Palpations: Abdomen is soft.      Tenderness: There is no rebound.   Musculoskeletal:         General: No swelling or tenderness.      Cervical back: Normal range of motion and neck supple.   Lymphadenopathy:      Cervical: No cervical adenopathy.   Skin:     Coloration: Skin is not jaundiced.      Findings: No erythema.   Neurological:      General: No focal deficit present.      Mental Status: He is alert and oriented to person, place, and time. Mental status is at baseline.      Cranial Nerves: No cranial nerve deficit.      Comments: Oriented but slightly confused per wife at bedside   Psychiatric:          Mood and Affect: Mood normal.         Behavior: Behavior normal.         Fluids    Intake/Output Summary (Last 24 hours) at 4/20/2024 1437  Last data filed at 4/20/2024 0315  Gross per 24 hour   Intake --   Output 1650 ml   Net -1650 ml       Laboratory  Recent Labs     04/18/24  0115 04/19/24  0149 04/20/24  0039   WBC 15.7* 18.1* 18.6*   RBC 6.29* 5.67 5.70   HEMOGLOBIN 12.4* 11.4* 11.3*   HEMATOCRIT 39.3* 36.2* 36.5*   MCV 62.5* 63.8* 64.0*   MCH 19.7* 20.1* 19.8*   MCHC 31.6* 31.5* 31.0*   RDW 33.6* 34.5* 35.2*   PLATELETCT 233 206 200   MPV 11.1 11.3 11.3     Recent Labs     04/18/24  0115 04/19/24 0149 04/20/24  0039   SODIUM 137 136 137   POTASSIUM 4.0 4.1 4.1   CHLORIDE 105 105 103   CO2 17* 19* 21   GLUCOSE 112* 107* 98   BUN 25* 25* 30*   CREATININE 1.34 1.33 1.66*   CALCIUM 9.3 8.9 9.1               Recent Labs     04/18/24 0115   TRIGLYCERIDE 119   HDL 30*   LDL 68       Imaging  MR-BRAIN-W/O   Final Result         Age-related volume loss and chronic microvascular ischemic changes.      Superficial siderosis noted in the right frontoparietal cortical surface. This could be related to remote intracranial traumatic injury or remote subarachnoid hemorrhage. Also, in the appropriate clinical setting, this can be seen with underlying amyloid    angiopathy.      EC-ECHOCARDIOGRAM COMPLETE W/O CONT   Final Result      CT-CTA HEAD WITH & W/O-POST PROCESS   Final Result         1.  No large vessel occlusion or aneurysm identified.      DX-CHEST-PORTABLE (1 VIEW)   Final Result      No evidence of acute cardiopulmonary process.           Assessment/Plan  * Dizziness- (present on admission)  Assessment & Plan  Had some improvement and had a positive vestibular sign on Rocheport-Hallpike maneuver, status post Epley with Hardy PT and will have another 1 again today  Start on Antivert Q8 PRN, continue Zyrtec and showing improvement.  -Anticoagulated with Apixaban  No evidence of stroke on MRI, continue with home dose  anticoagulation  Chronic microvascular changes and there is evidence of right frontoparietal siderosis, patient denies trauma to this area  Questionable occult infection given persistent leukocytosis without source, urinary would be the most likely, UA on 4/16 and 4/17 showed leuk esterase      Advance care planning  Assessment & Plan  Patient wants to be full code    Leukocytosis  Assessment & Plan  -WBC 18.1  -No other SIRS.   -U/A negative UTI, no Pneumonia COVID Viral panel negative.   Trial Ancef to see if WBC changes, procalcitonin 0.07    Blood cultures without growth  Respiratory viral PCR pending, cough for the last month  No further retention as Walden is in place however WBC still elevated       Microcytic anemia  Assessment & Plan  Hgb 11.6 on admission        Dyslipidemia- (present on admission)  Assessment & Plan  atorvastatin    Anticoagulated- (present on admission)  Assessment & Plan  -He is on Eliquis for atrial fibrillation.    Atrial flutter (HCC)- (present on admission)  Assessment & Plan  -Continue metoprolol and apixaban.  Now with RVR  Load digoxin IV and following with p.o.         VTE prophylaxis:    therapeutic anticoagulation with eliquis 5 mg BID      I have performed a physical exam and reviewed and updated ROS and Plan today (4/20/2024). In review of yesterday's note (4/19/2024), there are no changes except as documented above.

## 2024-04-20 NOTE — PROGRESS NOTES
OVERNIGHT HOSPITALIST:    Paged by nursing Staff that this patient multiple times this evening.  Patient was having A-fib with RVR.  Patient received IV fluids and metoprolol but later blood pressure was 80s over 50s reason why I will order 150 mg of IV amiodarone.  Patient takes metoprolol at home but medication has been held.  Will continue to closely monitor.

## 2024-04-20 NOTE — PROGRESS NOTES
"0135 Notified by monitor tech of patient sustaining heart rate of 130-140s. Pt assessed, pt denies any symptoms and \"feels fine\". Dr. Burciaga notified.     0155 /65. IV 500mL bolus ordered.     0256 IV bolus complete. /86. IV metoprolol administered per MAR & MD.     0256 IV metoprolol administered per MAR.     0315 Pt stood edge of bed and stated he was feeling dizzy. CNA and this RN assisted patient to bed. /81. Pt states feeling better now that he's lying down. MD requests to keep pt in bed due to blood pressure & dizziness.     0339 Notified by monitor tech of patient continuing to sustain heart rate or 130-140s. Dr. Burciaga notified.     0430 BP 76/51. Dr. Burciaga notified. 500mL bolus ordered.     0523 Bolus complete. BP 92/69.    0538 Amiodarone administered per MAR.   "

## 2024-04-20 NOTE — CARE PLAN
The patient is Stable - Low risk of patient condition declining or worsening    Shift Goals  Clinical Goals: q4 neuro check, NIH  Patient Goals: rest    Progress made toward(s) clinical / shift goals:    Problem: Neuro Status  Goal: Neuro status will remain stable or improve  Outcome: Progressing  Note: Pt does not show signs of worsening confusion. NIHSS continuously scored at 1.      Problem: Infection - Standard  Goal: Patient will remain free from infection  Outcome: Progressing  Note: IV antibiotics administered per MAR.        Patient is not progressing towards the following goals:

## 2024-04-21 LAB
ANION GAP SERPL CALC-SCNC: 13 MMOL/L (ref 7–16)
BUN SERPL-MCNC: 27 MG/DL (ref 8–22)
CALCIUM SERPL-MCNC: 8.7 MG/DL (ref 8.4–10.2)
CHLORIDE SERPL-SCNC: 105 MMOL/L (ref 96–112)
CO2 SERPL-SCNC: 17 MMOL/L (ref 20–33)
CREAT SERPL-MCNC: 1.51 MG/DL (ref 0.5–1.4)
EKG IMPRESSION: NORMAL
ERYTHROCYTE [DISTWIDTH] IN BLOOD BY AUTOMATED COUNT: 34.7 FL (ref 35.9–50)
GFR SERPLBLD CREATININE-BSD FMLA CKD-EPI: 47 ML/MIN/1.73 M 2
GLUCOSE SERPL-MCNC: 114 MG/DL (ref 65–99)
HCT VFR BLD AUTO: 39 % (ref 42–52)
HGB BLD-MCNC: 12.2 G/DL (ref 14–18)
MCH RBC QN AUTO: 19.9 PG (ref 27–33)
MCHC RBC AUTO-ENTMCNC: 31.3 G/DL (ref 32.3–36.5)
MCV RBC AUTO: 63.7 FL (ref 81.4–97.8)
PLATELET # BLD AUTO: 204 K/UL (ref 164–446)
PMV BLD AUTO: 11.2 FL (ref 9–12.9)
POTASSIUM SERPL-SCNC: 4 MMOL/L (ref 3.6–5.5)
RBC # BLD AUTO: 6.12 M/UL (ref 4.7–6.1)
SODIUM SERPL-SCNC: 135 MMOL/L (ref 135–145)
WBC # BLD AUTO: 17.9 K/UL (ref 4.8–10.8)

## 2024-04-21 PROCEDURE — A9270 NON-COVERED ITEM OR SERVICE: HCPCS | Performed by: HOSPITALIST

## 2024-04-21 PROCEDURE — 97116 GAIT TRAINING THERAPY: CPT

## 2024-04-21 PROCEDURE — 770020 HCHG ROOM/CARE - TELE (206)

## 2024-04-21 PROCEDURE — A9270 NON-COVERED ITEM OR SERVICE: HCPCS | Performed by: INTERNAL MEDICINE

## 2024-04-21 PROCEDURE — 80048 BASIC METABOLIC PNL TOTAL CA: CPT

## 2024-04-21 PROCEDURE — 97530 THERAPEUTIC ACTIVITIES: CPT

## 2024-04-21 PROCEDURE — 51798 US URINE CAPACITY MEASURE: CPT

## 2024-04-21 PROCEDURE — 700102 HCHG RX REV CODE 250 W/ 637 OVERRIDE(OP): Performed by: INTERNAL MEDICINE

## 2024-04-21 PROCEDURE — 99232 SBSQ HOSP IP/OBS MODERATE 35: CPT | Performed by: INTERNAL MEDICINE

## 2024-04-21 PROCEDURE — 93010 ELECTROCARDIOGRAM REPORT: CPT | Performed by: INTERNAL MEDICINE

## 2024-04-21 PROCEDURE — 94760 N-INVAS EAR/PLS OXIMETRY 1: CPT

## 2024-04-21 PROCEDURE — 700105 HCHG RX REV CODE 258: Performed by: INTERNAL MEDICINE

## 2024-04-21 PROCEDURE — 700111 HCHG RX REV CODE 636 W/ 250 OVERRIDE (IP): Mod: JZ | Performed by: INTERNAL MEDICINE

## 2024-04-21 PROCEDURE — 36415 COLL VENOUS BLD VENIPUNCTURE: CPT

## 2024-04-21 PROCEDURE — 85027 COMPLETE CBC AUTOMATED: CPT

## 2024-04-21 PROCEDURE — 700102 HCHG RX REV CODE 250 W/ 637 OVERRIDE(OP): Performed by: HOSPITALIST

## 2024-04-21 RX ORDER — AMIODARONE HYDROCHLORIDE 200 MG/1
400 TABLET ORAL TWICE DAILY
Status: DISCONTINUED | OUTPATIENT
Start: 2024-04-21 | End: 2024-04-24 | Stop reason: HOSPADM

## 2024-04-21 RX ORDER — METOPROLOL SUCCINATE 25 MG/1
25 TABLET, EXTENDED RELEASE ORAL ONCE
Status: COMPLETED | OUTPATIENT
Start: 2024-04-21 | End: 2024-04-21

## 2024-04-21 RX ADMIN — METOPROLOL SUCCINATE 25 MG: 25 TABLET, EXTENDED RELEASE ORAL at 08:23

## 2024-04-21 RX ADMIN — AMIODARONE HYDROCHLORIDE 0.5 MG/MIN: 1.8 INJECTION, SOLUTION INTRAVENOUS at 00:33

## 2024-04-21 RX ADMIN — DOCUSATE SODIUM 50MG AND SENNOSIDES 8.6MG 2 TABLET: 8.6; 5 TABLET, FILM COATED ORAL at 06:20

## 2024-04-21 RX ADMIN — CEFTRIAXONE SODIUM 2000 MG: 2 INJECTION, POWDER, FOR SOLUTION INTRAMUSCULAR; INTRAVENOUS at 06:19

## 2024-04-21 RX ADMIN — AMIODARONE HYDROCHLORIDE 400 MG: 200 TABLET ORAL at 17:12

## 2024-04-21 RX ADMIN — ATORVASTATIN CALCIUM 40 MG: 40 TABLET, FILM COATED ORAL at 17:01

## 2024-04-21 RX ADMIN — CETIRIZINE HYDROCHLORIDE 10 MG: 10 TABLET, FILM COATED ORAL at 06:20

## 2024-04-21 RX ADMIN — APIXABAN 5 MG: 5 TABLET, FILM COATED ORAL at 17:01

## 2024-04-21 RX ADMIN — TERAZOSIN 10 MG: 5 CAPSULE ORAL at 17:01

## 2024-04-21 RX ADMIN — AMIODARONE HYDROCHLORIDE 400 MG: 200 TABLET ORAL at 11:20

## 2024-04-21 RX ADMIN — APIXABAN 5 MG: 5 TABLET, FILM COATED ORAL at 06:20

## 2024-04-21 ASSESSMENT — ENCOUNTER SYMPTOMS
SENSORY CHANGE: 0
MEMORY LOSS: 1
DEPRESSION: 0
BLURRED VISION: 0
VOMITING: 0
FEVER: 0
SPUTUM PRODUCTION: 0
HEADACHES: 0
CLAUDICATION: 0
MYALGIAS: 0
PHOTOPHOBIA: 0
ABDOMINAL PAIN: 0
INSOMNIA: 0
CONSTIPATION: 0
WEAKNESS: 0
SPEECH CHANGE: 0
DIARRHEA: 0
DIZZINESS: 0
CHILLS: 0
COUGH: 0
HEARTBURN: 0
SHORTNESS OF BREATH: 0

## 2024-04-21 ASSESSMENT — COGNITIVE AND FUNCTIONAL STATUS - GENERAL
STANDING UP FROM CHAIR USING ARMS: A LITTLE
MOVING TO AND FROM BED TO CHAIR: A LITTLE
MOBILITY SCORE: 18
SUGGESTED CMS G CODE MODIFIER MOBILITY: CK
WALKING IN HOSPITAL ROOM: A LITTLE
CLIMB 3 TO 5 STEPS WITH RAILING: A LOT
MOVING FROM LYING ON BACK TO SITTING ON SIDE OF FLAT BED: A LITTLE

## 2024-04-21 ASSESSMENT — FIBROSIS 4 INDEX: FIB4 SCORE: 1.45

## 2024-04-21 ASSESSMENT — GAIT ASSESSMENTS
GAIT LEVEL OF ASSIST: CONTACT GUARD ASSIST
DEVIATION: SHUFFLED GAIT;DECREASED BASE OF SUPPORT
ASSISTIVE DEVICE: FRONT WHEEL WALKER
DISTANCE (FEET): 125

## 2024-04-21 ASSESSMENT — PAIN DESCRIPTION - PAIN TYPE: TYPE: ACUTE PAIN

## 2024-04-21 NOTE — THERAPY
Physical Therapy   Daily Treatment     Patient Name: Hardy Roca  Age:  78 y.o., Sex:  male  Medical Record #: [id number]  Today's Date: 4/21/2024     Precautions  Precautions: Fall Risk    Assessment    Pt reports has not been OOB past few days, feeling weak, no c/o dizziness in bed, agreeable for PT.  Pt was able to amb 125 ft with FWW CGA, small shuffling seps, cues to increase step length however pt would do this for a few feet and then revert back to shuffling steps.  No dizziness reports throughout PT session.    Plan    Treatment Plan Status:    Type of Treatment: Bed Mobility, Neuro Re-Education / Balance, Family / Caregiver Training, Gait Training, Self Care / Home Evaluation, Stair Training, Therapeutic Activities, Therapeutic Exercise  Treatment Frequency: 5 Times per Week  Treatment Duration: Until Therapy Goals Met    DC Equipment Recommendations: Front-Wheel Walker  Discharge Recommendations: Recommend home health for continued physical therapy services       04/21/24 1245   Cognition    Level of Consciousness Alert   Comments Pt agreeable for PT, delayed responses to questions, slow processing and some confusion present.   Balance   Sitting Balance (Static) Fair   Sitting Balance (Dynamic) Fair   Standing Balance (Static) Fair -   Standing Balance (Dynamic) Fair -   Weight Shift Sitting Fair   Weight Shift Standing Fair   Skilled Intervention Postural Facilitation;Sequencing;Tactile Cuing;Verbal Cuing   Comments stdg with FWW   Bed Mobility    Supine to Sit Standby Assist   Gait Analysis   Gait Level Of Assist Contact Guard Assist   Assistive Device Front Wheel Walker   Distance (Feet) 125   # of Times Distance was Traveled 1   Deviation Shuffled Gait;Decreased Base Of Support  (small shuffling seps, cues to increase step length however pt would do this for a few feet and then revert back to shuffling steps)   Functional Mobility   Sit to Stand Contact Guard Assist   Bed, Chair, Wheelchair Transfer  Contact Guard Assist   Activity Tolerance   Standing 5 min, no c/o dizziness   Short Term Goals    Short Term Goal # 1 Dania with transfers in 4 visits   Goal Outcome # 1 goal not met   Short Term Goal # 2 Dania with amb x 100 feet with FWW in 4 visits   Goal Outcome # 2 Goal not met

## 2024-04-21 NOTE — CARE PLAN
The patient is Watcher - Medium risk of patient condition declining or worsening    Shift Goals  Clinical Goals: Q4 neuros, NIHSS, monitor heart rate and rhythm  Patient Goals: Have wife visit  Family Goals: ALEXANDRU    Progress made toward(s) clinical / shift goals:    Problem: Urinary Elimination  Goal: Establish and maintain regular urinary output  Outcome: Progressing   Patient has an indwelling catheter in place and is voiding properly. Output is being monitored. See flow sheets.     Patient is not progressing towards the following goals:      Problem: Knowledge Deficit - Standard  Goal: Patient and family/care givers will demonstrate understanding of plan of care, disease process/condition, diagnostic tests and medications  Outcome: Not Progressing   Patient has been updated on POC, patient is unable to verbalize an understanding. Patient needs frequent reminders. Patient has been educated on medications being administered. All questions answered at this time.

## 2024-04-21 NOTE — PROGRESS NOTES
[facility] Medicine Daily Progress Note    Date of Service  4/21/2024    Chief Complaint  Hardy Roca is a 78 y.o. male admitted 4/16/2024 with dizziness.    Hospital Course  Patient is a 78-year-old male with A-fib on Eliquis who came in with dizziness.  Patient's symptoms were worse when he woke up the day of admission.  Felt the sensation of things spinning around him.  He stood up from the bed but fell back onto the bed secondary to the dizziness.  No headache weakness or visual abnormalities other than he closes his right eye when trying to focus.  Patient states he is unaware that he is closing his right eye.  Patient underwent MRI of the brain which showed no evidence of stroke but showed chronic microvascular changes and some siderosis on the right frontal parietal lobe.  Patient denies any injury to this area.  Patient was evaluated by physical therapy who was able to elicit a positive Omayra-Hallpike.  He underwent Epley maneuver both 4/17 and again today on 4/18.  Does have improvement of symptoms after this manipulation.  I will also start him on scheduled Zyrtec and as needed Antivert to see if symptoms improved.  He also had urinary retention as his terazosin was not initiated on admission and required Walden catheter.  Hopefully after 24 to 48 hours of terazosin he will no longer require catheterization.    Interval Problem Update  4/18 patient did intermittently feel better yesterday for short period of time after the Epley maneuver but states he feels about back to the same.  He still remains dizzy and he was up ambulating yesterday with bedside RN and front wheel walker and started leaning towards 1 side and was caught from falling.  PT worked with the patient again today with another Epley maneuver with improvement again in his symptoms.  I will start him also on Antivert as well as Zyrtec in case there is a seasonal allergy component to this.  I have also ordered respiratory viral PCR in case this  is occult viral infection as he has had a cough for approximately 1 month per patient's wife at bedside.  4/19 patient is showing improvements today.  He does still have some lightheadedness but the dizziness seems to have improved with the second Epley maneuver done yesterday.  WBC count is still elevated.  Pro-Benny is normal at 0.07 however given the leukocytosis and the intermittent confusion I am concerned that there is occult infection, will trial Ancef to see if this makes any difference.  Viral PCR is negative.  4/20 patient went into RVR overnight, received IV metoprolol, fluid bolus, IV amiodarone but minimal improvement.  Started on IV digoxin this morning and given metoprolol XL, heart rate is improving.  WBC count continues to be elevated, no change with Ancef, DC and trial Rocephin.  Creatinine slightly worse today at 1.66.  As patient had poor sleep overnight his mentation is worse again today.  His heart rate is under good control patient will be able to get up and see how his dizziness has changed.  If recurrence of RVR the patient was started on amiodarone drip.  He had  4/21 patient continued to have significant tachycardia despite 2 doses of digoxin yesterday.  Given the ineffectiveness, digoxin was discontinued and amiodarone drip was started.  Few hours after initiation of amiodarone patient went back into normal sinus rhythm and remains there.  He continues on his beta-blocker and has transition from IV to oral amiodarone.  WBC count is slightly less today at 17.9 with the initiation of Rocephin however given the lab variability this could just be unrelated to the antibiotic.  If he still has significant leukocytosis tomorrow then I will get a CT chest abdomen pelvis without contrast.  Walden catheter to be removed today to make sure the patient can void.  If WBC count dropped significantly tomorrow then I will have him complete a course of Omnicef at home.  Discussed with wife at bedside,  patient and wife agreeable with plan of care.    I have discussed this patient's plan of care and discharge plan at IDT rounds today with Case Management, Nursing, Nursing leadership, and other members of the IDT team.    Consultants/Specialty  none    Code Status  Full Code    Disposition  The patient is not medically cleared for discharge to home or a post-acute facility.  Anticipate discharge to: home with organized home healthcare and close outpatient follow-up    I have placed the appropriate orders for post-discharge needs.    Review of Systems  Review of Systems   Constitutional:  Negative for chills and fever.   HENT:  Negative for congestion.    Eyes:  Negative for blurred vision and photophobia.   Respiratory:  Negative for cough, sputum production and shortness of breath.    Cardiovascular:  Negative for chest pain, claudication and leg swelling.   Gastrointestinal:  Negative for abdominal pain, constipation, diarrhea, heartburn and vomiting.   Genitourinary:  Negative for dysuria and hematuria.   Musculoskeletal:  Negative for joint pain and myalgias.   Skin:  Negative for itching and rash.   Neurological:  Negative for dizziness, sensory change, speech change, weakness and headaches.   Psychiatric/Behavioral:  Positive for memory loss (same). Negative for depression. The patient does not have insomnia.         Physical Exam  Temp:  [36 °C (96.8 °F)-36.9 °C (98.4 °F)] 36.4 °C (97.6 °F)  Pulse:  [] 64  Resp:  [16-18] 17  BP: ()/(52-69) 104/58  SpO2:  [91 %-95 %] 94 %    Physical Exam  Vitals and nursing note reviewed.   Constitutional:       General: He is not in acute distress.     Appearance: Normal appearance.   HENT:      Head: Normocephalic and atraumatic.   Eyes:      General: No scleral icterus.     Extraocular Movements: Extraocular movements intact.   Cardiovascular:      Rate and Rhythm: Tachycardia present. Rhythm irregular.      Pulses: Normal pulses.      Heart sounds: Normal  heart sounds. No murmur heard.  Pulmonary:      Effort: Pulmonary effort is normal. No respiratory distress.      Breath sounds: Normal breath sounds. No wheezing, rhonchi or rales.   Abdominal:      General: Abdomen is flat. Bowel sounds are normal. There is no distension.      Palpations: Abdomen is soft.      Tenderness: There is no rebound.   Musculoskeletal:         General: No swelling or tenderness.      Cervical back: Normal range of motion and neck supple.   Lymphadenopathy:      Cervical: No cervical adenopathy.   Skin:     Coloration: Skin is not jaundiced.      Findings: No erythema.   Neurological:      General: No focal deficit present.      Mental Status: He is alert and oriented to person, place, and time. Mental status is at baseline.      Cranial Nerves: No cranial nerve deficit.      Comments: Oriented but slightly confused per wife at bedside   Psychiatric:         Mood and Affect: Mood normal.         Behavior: Behavior normal.         Fluids    Intake/Output Summary (Last 24 hours) at 4/21/2024 1348  Last data filed at 4/21/2024 1323  Gross per 24 hour   Intake 60 ml   Output 1500 ml   Net -1440 ml       Laboratory  Recent Labs     04/19/24  0149 04/20/24  0039 04/21/24  0121   WBC 18.1* 18.6* 17.9*   RBC 5.67 5.70 6.12*   HEMOGLOBIN 11.4* 11.3* 12.2*   HEMATOCRIT 36.2* 36.5* 39.0*   MCV 63.8* 64.0* 63.7*   MCH 20.1* 19.8* 19.9*   MCHC 31.5* 31.0* 31.3*   RDW 34.5* 35.2* 34.7*   PLATELETCT 206 200 204   MPV 11.3 11.3 11.2     Recent Labs     04/19/24  0149 04/20/24  0039 04/21/24  0121   SODIUM 136 137 135   POTASSIUM 4.1 4.1 4.0   CHLORIDE 105 103 105   CO2 19* 21 17*   GLUCOSE 107* 98 114*   BUN 25* 30* 27*   CREATININE 1.33 1.66* 1.51*   CALCIUM 8.9 9.1 8.7                       Imaging  MR-BRAIN-W/O   Final Result         Age-related volume loss and chronic microvascular ischemic changes.      Superficial siderosis noted in the right frontoparietal cortical surface. This could be related to  remote intracranial traumatic injury or remote subarachnoid hemorrhage. Also, in the appropriate clinical setting, this can be seen with underlying amyloid    angiopathy.      EC-ECHOCARDIOGRAM COMPLETE W/O CONT   Final Result      CT-CTA HEAD WITH & W/O-POST PROCESS   Final Result         1.  No large vessel occlusion or aneurysm identified.      DX-CHEST-PORTABLE (1 VIEW)   Final Result      No evidence of acute cardiopulmonary process.           Assessment/Plan  * Dizziness- (present on admission)  Assessment & Plan  Had some improvement and had a positive vestibular sign on [place]-Hallpike maneuver, status post Epley with Hardy PT and will have another 1 again today  Start on Antivert Q8 PRN, continue Zyrtec and showing improvement.  -Anticoagulated with Apixaban  No evidence of stroke on MRI, continue with home dose anticoagulation  Chronic microvascular changes and there is evidence of right frontoparietal siderosis, patient denies trauma to this area  Questionable occult infection given persistent leukocytosis without source, urinary would be the most likely, UA on 4/16 and 4/17 showed leuk esterase      Advance care planning  Assessment & Plan  Patient wants to be full code    Leukocytosis  Assessment & Plan  -WBC 17.9  -No other SIRS.   -U/A negative UTI, no Pneumonia COVID Viral panel negative.   Treatment with Ancef did not change WBC trial Rocephin - slight decrease WBC  If WBC still about same tomorrow, will get CT C.a.p without contrast   procalcitonin 0.07    Blood cultures without growth  Respiratory viral PCR pending, cough for the last month  No further retention as Walden is in place however WBC still elevated       Microcytic anemia  Assessment & Plan  Hgb 11.6 on admission        Dyslipidemia- (present on admission)  Assessment & Plan  atorvastatin    Anticoagulated- (present on admission)  Assessment & Plan  -He is on Eliquis for atrial fibrillation.    Atrial flutter (HCC)- (present on  admission)  Assessment & Plan  -Continue metoprolol and apixaban.  Now with RVR  No improvement with digoxin, discontinue  Started on amiodarone drip and converted back to normal sinus rhythm  Continue with p.o. amnio and will follow-up with Dr. Kramer           VTE prophylaxis:    therapeutic anticoagulation with eliquis 5 mg BID      I have performed a physical exam and reviewed and updated ROS and Plan today (4/21/2024). In review of yesterday's note (4/20/2024), there are no changes except as documented above.

## 2024-04-21 NOTE — PROGRESS NOTES
Telemetry Shift Summary     Rhythm: Afib/Aflutter  HR: 119-132  Ectopy: r pvc    Measurements: -/0.08/-    Normal Values  Rhythm: SR  HR:   Measurements: 0.12-0.20/0.08-0.10/0.30-0.52

## 2024-04-21 NOTE — PROGRESS NOTES
Assumed care of patient at bedside report from day shift RN. Updated on POC. Patient currently A & O x 2, disoriented to time and situation, on room air O2 94%, up in bed, without complaints of acute pain. Assessment completed. Call light within reach. Whiteboard updated. Fall precautions in place. Bed locked and in lowest position. All questions answered. No other needs indicated at this time.

## 2024-04-22 ENCOUNTER — APPOINTMENT (OUTPATIENT)
Dept: RADIOLOGY | Facility: MEDICAL CENTER | Age: 79
DRG: 149 | End: 2024-04-22
Attending: INTERNAL MEDICINE
Payer: MEDICARE

## 2024-04-22 LAB
ANION GAP SERPL CALC-SCNC: 12 MMOL/L (ref 7–16)
BACTERIA BLD CULT: NORMAL
BACTERIA BLD CULT: NORMAL
BUN SERPL-MCNC: 31 MG/DL (ref 8–22)
CALCIUM SERPL-MCNC: 8.8 MG/DL (ref 8.4–10.2)
CHLORIDE SERPL-SCNC: 104 MMOL/L (ref 96–112)
CO2 SERPL-SCNC: 19 MMOL/L (ref 20–33)
CREAT SERPL-MCNC: 1.7 MG/DL (ref 0.5–1.4)
ERYTHROCYTE [DISTWIDTH] IN BLOOD BY AUTOMATED COUNT: 34.9 FL (ref 35.9–50)
GFR SERPLBLD CREATININE-BSD FMLA CKD-EPI: 40 ML/MIN/1.73 M 2
GLUCOSE SERPL-MCNC: 103 MG/DL (ref 65–99)
HCT VFR BLD AUTO: 38.7 % (ref 42–52)
HGB BLD-MCNC: 11.9 G/DL (ref 14–18)
MCH RBC QN AUTO: 19.9 PG (ref 27–33)
MCHC RBC AUTO-ENTMCNC: 30.7 G/DL (ref 32.3–36.5)
MCV RBC AUTO: 64.6 FL (ref 81.4–97.8)
PLATELET # BLD AUTO: 201 K/UL (ref 164–446)
PMV BLD AUTO: 11.4 FL (ref 9–12.9)
POTASSIUM SERPL-SCNC: 4 MMOL/L (ref 3.6–5.5)
RBC # BLD AUTO: 5.99 M/UL (ref 4.7–6.1)
SIGNIFICANT IND 70042: NORMAL
SIGNIFICANT IND 70042: NORMAL
SITE SITE: NORMAL
SITE SITE: NORMAL
SODIUM SERPL-SCNC: 135 MMOL/L (ref 135–145)
SOURCE SOURCE: NORMAL
SOURCE SOURCE: NORMAL
WBC # BLD AUTO: 18 K/UL (ref 4.8–10.8)

## 2024-04-22 PROCEDURE — 94760 N-INVAS EAR/PLS OXIMETRY 1: CPT

## 2024-04-22 PROCEDURE — 770020 HCHG ROOM/CARE - TELE (206)

## 2024-04-22 PROCEDURE — 700111 HCHG RX REV CODE 636 W/ 250 OVERRIDE (IP): Mod: JZ | Performed by: INTERNAL MEDICINE

## 2024-04-22 PROCEDURE — 71250 CT THORAX DX C-: CPT

## 2024-04-22 PROCEDURE — A9270 NON-COVERED ITEM OR SERVICE: HCPCS | Performed by: INTERNAL MEDICINE

## 2024-04-22 PROCEDURE — 700105 HCHG RX REV CODE 258: Performed by: INTERNAL MEDICINE

## 2024-04-22 PROCEDURE — 700102 HCHG RX REV CODE 250 W/ 637 OVERRIDE(OP): Performed by: INTERNAL MEDICINE

## 2024-04-22 PROCEDURE — 51798 US URINE CAPACITY MEASURE: CPT

## 2024-04-22 PROCEDURE — 80048 BASIC METABOLIC PNL TOTAL CA: CPT

## 2024-04-22 PROCEDURE — 99232 SBSQ HOSP IP/OBS MODERATE 35: CPT | Performed by: INTERNAL MEDICINE

## 2024-04-22 PROCEDURE — 85027 COMPLETE CBC AUTOMATED: CPT

## 2024-04-22 PROCEDURE — A9270 NON-COVERED ITEM OR SERVICE: HCPCS | Performed by: HOSPITALIST

## 2024-04-22 PROCEDURE — 36415 COLL VENOUS BLD VENIPUNCTURE: CPT

## 2024-04-22 PROCEDURE — 700102 HCHG RX REV CODE 250 W/ 637 OVERRIDE(OP): Performed by: HOSPITALIST

## 2024-04-22 RX ADMIN — CETIRIZINE HYDROCHLORIDE 10 MG: 10 TABLET, FILM COATED ORAL at 05:01

## 2024-04-22 RX ADMIN — APIXABAN 5 MG: 5 TABLET, FILM COATED ORAL at 05:01

## 2024-04-22 RX ADMIN — TERAZOSIN 10 MG: 5 CAPSULE ORAL at 17:11

## 2024-04-22 RX ADMIN — ATORVASTATIN CALCIUM 40 MG: 40 TABLET, FILM COATED ORAL at 17:10

## 2024-04-22 RX ADMIN — AMIODARONE HYDROCHLORIDE 400 MG: 200 TABLET ORAL at 17:10

## 2024-04-22 RX ADMIN — DOCUSATE SODIUM 50MG AND SENNOSIDES 8.6MG 2 TABLET: 8.6; 5 TABLET, FILM COATED ORAL at 17:10

## 2024-04-22 RX ADMIN — APIXABAN 5 MG: 5 TABLET, FILM COATED ORAL at 17:11

## 2024-04-22 RX ADMIN — CEFTRIAXONE SODIUM 2000 MG: 2 INJECTION, POWDER, FOR SOLUTION INTRAMUSCULAR; INTRAVENOUS at 05:01

## 2024-04-22 RX ADMIN — AMIODARONE HYDROCHLORIDE 400 MG: 200 TABLET ORAL at 05:01

## 2024-04-22 ASSESSMENT — ENCOUNTER SYMPTOMS
BLURRED VISION: 0
FEVER: 0
DIARRHEA: 0
COUGH: 0
VOMITING: 0
CONSTIPATION: 0
CLAUDICATION: 0
SHORTNESS OF BREATH: 0
MYALGIAS: 0
PHOTOPHOBIA: 0
SPUTUM PRODUCTION: 0
MEMORY LOSS: 1
ABDOMINAL PAIN: 0
CHILLS: 0
DIZZINESS: 0
SENSORY CHANGE: 0
DEPRESSION: 0
HEADACHES: 0
SPEECH CHANGE: 0
WEAKNESS: 0
INSOMNIA: 0
HEARTBURN: 0

## 2024-04-22 ASSESSMENT — PAIN DESCRIPTION - PAIN TYPE
TYPE: ACUTE PAIN
TYPE: ACUTE PAIN

## 2024-04-22 NOTE — PROGRESS NOTES
Assumed care of patient at bedside report from day shift RN. Updated on POC. Patient currently A & O x 3, disoriented to time, on room air O2 96%, up in bed, without complaints of acute pain. Assessment completed. Call light within reach. Whiteboard updated. Fall precautions in place. Bed locked and in lowest position. All questions answered. No other needs indicated at this time.

## 2024-04-22 NOTE — DISCHARGE PLANNING
Received Choice Form at: 852  Agency/Facility Name: [facility]  Sent Referral per Choice Form at: Not sent, Choice stored in media    CM RN notified

## 2024-04-22 NOTE — CARE PLAN
The patient is Stable - Low risk of patient condition declining or worsening    Shift Goals  Clinical Goals: Q4 neuro; NIH as ordered; patient safety; monitor retention  Patient Goals: go home  Family Goals: ALEXANDRU    Progress made toward(s) clinical / shift goals:    Problem: Knowledge Deficit - Standard  Goal: Patient and family/care givers will demonstrate understanding of plan of care, disease process/condition, diagnostic tests and medications  Outcome: Progressing  Note: Patient updated on plan of care. Plan for bone biopsy tomorrow, increase activity as tolerated; monitor labs and vitals. Patient and family agreeable to plan of care.      Problem: Fall Risk  Goal: Patient will remain free from falls  Outcome: Progressing  Note: Appropriate fall prevention interventions in place. Patient and family educated on preventing falls. Patient verbalized understanding, call light within reach, patient calls appropriately.        Patient is not progressing towards the following goals:

## 2024-04-22 NOTE — PROGRESS NOTES
[facility] Medicine Daily Progress Note    Date of Service  4/22/2024    Chief Complaint  Hardy Roca is a 78 y.o. male admitted 4/16/2024 with dizziness.    Hospital Course  Patient is a 78-year-old male with A-fib on Eliquis who came in with dizziness.  Patient's symptoms were worse when he woke up the day of admission.  Felt the sensation of things spinning around him.  He stood up from the bed but fell back onto the bed secondary to the dizziness.  No headache weakness or visual abnormalities other than he closes his right eye when trying to focus.  Patient states he is unaware that he is closing his right eye.  Patient underwent MRI of the brain which showed no evidence of stroke but showed chronic microvascular changes and some siderosis on the right frontal parietal lobe.  Patient denies any injury to this area.  Patient was evaluated by physical therapy who was able to elicit a positive Omayra-Hallpike.  He underwent Epley maneuver both 4/17 and again today on 4/18.  Does have improvement of symptoms after this manipulation.  I will also start him on scheduled Zyrtec and as needed Antivert to see if symptoms improved.  He also had urinary retention as his terazosin was not initiated on admission and required Walden catheter.  Hopefully after 24 to 48 hours of terazosin he will no longer require catheterization.    Interval Problem Update  4/18 patient did intermittently feel better yesterday for short period of time after the Epley maneuver but states he feels about back to the same.  He still remains dizzy and he was up ambulating yesterday with bedside RN and front wheel walker and started leaning towards 1 side and was caught from falling.  PT worked with the patient again today with another Epley maneuver with improvement again in his symptoms.  I will start him also on Antivert as well as Zyrtec in case there is a seasonal allergy component to this.  I have also ordered respiratory viral PCR in case this  is occult viral infection as he has had a cough for approximately 1 month per patient's wife at bedside.  4/19 patient is showing improvements today.  He does still have some lightheadedness but the dizziness seems to have improved with the second Epley maneuver done yesterday.  WBC count is still elevated.  Pro-Benny is normal at 0.07 however given the leukocytosis and the intermittent confusion I am concerned that there is occult infection, will trial Ancef to see if this makes any difference.  Viral PCR is negative.  4/20 patient went into RVR overnight, received IV metoprolol, fluid bolus, IV amiodarone but minimal improvement.  Started on IV digoxin this morning and given metoprolol XL, heart rate is improving.  WBC count continues to be elevated, no change with Ancef, DC and trial Rocephin.  Creatinine slightly worse today at 1.66.  As patient had poor sleep overnight his mentation is worse again today.  His heart rate is under good control patient will be able to get up and see how his dizziness has changed.  If recurrence of RVR the patient was started on amiodarone drip.  He had  4/21 patient continued to have significant tachycardia despite 2 doses of digoxin yesterday.  Given the ineffectiveness, digoxin was discontinued and amiodarone drip was started.  Few hours after initiation of amiodarone patient went back into normal sinus rhythm and remains there.  He continues on his beta-blocker and has transition from IV to oral amiodarone.  WBC count is slightly less today at 17.9 with the initiation of Rocephin however given the lab variability this could just be unrelated to the antibiotic.  If he still has significant leukocytosis tomorrow then I will get a CT chest abdomen pelvis without contrast.  Walden catheter to be removed today to make sure the patient can void.  If WBC count dropped significantly tomorrow then I will have him complete a course of Omnicef at home.  Discussed with wife at bedside,  patient and wife agreeable with plan of care.  4/22 patient doing about the same today.  WBC count still up to 18,000 and antibiotics discontinued.  CT chest abdomen pelvis shows no adenopathy or evidence of lymphoma.  14 mm bladder calculus and tiny nonobstructive right renal calculi, incidental right renal cyst.  No evidence that should account for his leukocytosis.  I then reached out to oncology as I felt that a bone marrow biopsy might be necessary.  Per Dr. Berry recommendation is to do a flow cytometry on peripheral blood smear rather than jump to bone marrow biopsy.  This has been ordered.    I have discussed this patient's plan of care and discharge plan at IDT rounds today with Case Management, Nursing, Nursing leadership, and other members of the IDT team.    Consultants/Specialty  none    Code Status  Full Code    Disposition  The patient is not medically cleared for discharge to home or a post-acute facility.  Anticipate discharge to: home with organized home healthcare and close outpatient follow-up    I have placed the appropriate orders for post-discharge needs.    Review of Systems  Review of Systems   Constitutional:  Negative for chills and fever.   HENT:  Negative for congestion.    Eyes:  Negative for blurred vision and photophobia.   Respiratory:  Negative for cough, sputum production and shortness of breath.    Cardiovascular:  Negative for chest pain, claudication and leg swelling.   Gastrointestinal:  Negative for abdominal pain, constipation, diarrhea, heartburn and vomiting.   Genitourinary:  Negative for dysuria and hematuria.   Musculoskeletal:  Negative for joint pain and myalgias.   Skin:  Negative for itching and rash.   Neurological:  Negative for dizziness, sensory change, speech change, weakness and headaches.   Psychiatric/Behavioral:  Positive for memory loss (same). Negative for depression. The patient does not have insomnia.         Physical Exam  Temp:  [36.4 °C (97.5 °F)-36.8  °C (98.2 °F)] 36.7 °C (98.1 °F)  Pulse:  [59-74] 61  Resp:  [16-18] 18  BP: (108-130)/(59-78) 120/60  SpO2:  [93 %-96 %] 95 %    Physical Exam  Vitals and nursing note reviewed.   Constitutional:       General: He is not in acute distress.     Appearance: Normal appearance.   HENT:      Head: Normocephalic and atraumatic.   Eyes:      General: No scleral icterus.     Extraocular Movements: Extraocular movements intact.   Cardiovascular:      Rate and Rhythm: Tachycardia present. Rhythm irregular.      Pulses: Normal pulses.      Heart sounds: Normal heart sounds. No murmur heard.  Pulmonary:      Effort: Pulmonary effort is normal. No respiratory distress.      Breath sounds: Normal breath sounds. No wheezing, rhonchi or rales.   Abdominal:      General: Abdomen is flat. Bowel sounds are normal. There is no distension.      Palpations: Abdomen is soft.      Tenderness: There is no rebound.   Musculoskeletal:         General: No swelling or tenderness.      Cervical back: Normal range of motion and neck supple.   Lymphadenopathy:      Cervical: No cervical adenopathy.   Skin:     Coloration: Skin is not jaundiced.      Findings: No erythema.   Neurological:      General: No focal deficit present.      Mental Status: He is alert and oriented to person, place, and time. Mental status is at baseline.      Cranial Nerves: No cranial nerve deficit.      Comments: Oriented but slightly confused per wife at bedside   Psychiatric:         Mood and Affect: Mood normal.         Behavior: Behavior normal.         Fluids    Intake/Output Summary (Last 24 hours) at 4/22/2024 1438  Last data filed at 4/22/2024 0849  Gross per 24 hour   Intake --   Output 200 ml   Net -200 ml       Laboratory  Recent Labs     04/20/24  0039 04/21/24  0121 04/22/24  0112   WBC 18.6* 17.9* 18.0*   RBC 5.70 6.12* 5.99   HEMOGLOBIN 11.3* 12.2* 11.9*   HEMATOCRIT 36.5* 39.0* 38.7*   MCV 64.0* 63.7* 64.6*   MCH 19.8* 19.9* 19.9*   MCHC 31.0* 31.3* 30.7*    RDW 35.2* 34.7* 34.9*   PLATELETCT 200 204 201   MPV 11.3 11.2 11.4     Recent Labs     04/20/24  0039 04/21/24  0121 04/22/24  0112   SODIUM 137 135 135   POTASSIUM 4.1 4.0 4.0   CHLORIDE 103 105 104   CO2 21 17* 19*   GLUCOSE 98 114* 103*   BUN 30* 27* 31*   CREATININE 1.66* 1.51* 1.70*   CALCIUM 9.1 8.7 8.8                       Imaging  CT-CHEST,ABDOMEN,PELVIS W/O   Final Result      1.  No adenopathy or other evidence for lymphoma within the chest, abdomen, or pelvis      2.  Mild splenomegaly, nonspecific      3.  Small bilateral pleural effusion      4.  14 mm bladder calculus and there are tiny nonobstructive right renal calculi      5.  Incidental right renal cyst      6.  Diffuse atherosclerotic plaque      7.  Marked enlargement of the prostate      MR-BRAIN-W/O   Final Result         Age-related volume loss and chronic microvascular ischemic changes.      Superficial siderosis noted in the right frontoparietal cortical surface. This could be related to remote intracranial traumatic injury or remote subarachnoid hemorrhage. Also, in the appropriate clinical setting, this can be seen with underlying amyloid    angiopathy.      EC-ECHOCARDIOGRAM COMPLETE W/O CONT   Final Result      CT-CTA HEAD WITH & W/O-POST PROCESS   Final Result         1.  No large vessel occlusion or aneurysm identified.      DX-CHEST-PORTABLE (1 VIEW)   Final Result      No evidence of acute cardiopulmonary process.           Assessment/Plan  * Dizziness- (present on admission)  Assessment & Plan  Resolved  Had some improvement and had a positive vestibular sign on Omayra-Hallpike maneuver, status post Epley with Hardy PT and will have another 1 again today  Start on Antivert Q8 PRN, continue Zyrtec and showing improvement.  -Anticoagulated with Apixaban  No evidence of stroke on MRI, continue with home dose anticoagulation  Chronic microvascular changes and there is evidence of right frontoparietal siderosis, patient denies trauma to  this area  Questionable occult infection given persistent leukocytosis without source, urinary would be the most likely, UA on 4/16 and 4/17 showed leuk esterase      Advance care planning  Assessment & Plan  Patient wants to be full code    Leukocytosis  Assessment & Plan  -WBC 18.0  -No other SIRS.   -U/A negative UTI, no Pneumonia COVID Viral panel negative.   Treatment with Ancef and Rocephin - no change  Discussed briefly with Dr Berry, recommending flow cytometry and if there is abnormality then will investigate firther      Blood cultures without growth  Negative Respiratory viral PCR pending  Walden removed, straight cath prn       Microcytic anemia  Assessment & Plan  Hgb 11.6 on admission        Dyslipidemia- (present on admission)  Assessment & Plan  atorvastatin    Anticoagulated- (present on admission)  Assessment & Plan  -He is on Eliquis for atrial fibrillation.    Atrial flutter (HCC)- (present on admission)  Assessment & Plan  -Continue metoprolol and apixaban.  Now with RVR  No improvement with digoxin, discontinue  Started on amiodarone drip and converted back to normal sinus rhythm  Continue with p.o. amnio and will follow-up with Dr. Kramer           VTE prophylaxis:   SCDs/TEDs   therapeutic anticoagulation with eliquis 5 mg BID      I have performed a physical exam and reviewed and updated ROS and Plan today (4/22/2024). In review of yesterday's note (4/21/2024), there are no changes except as documented above.

## 2024-04-22 NOTE — PROGRESS NOTES
Patient straight cathed per order. Bladder scan showed 588 ml. 600 ml obtained from straight cath. Dr. Arevalo notified,.

## 2024-04-22 NOTE — DISCHARGE PLANNING
Case Management Discharge Planning    Admission Date: 4/16/2024  GMLOS: 1.9  ALOS: 3    6-Clicks ADL Score: 24  6-Clicks Mobility Score: 18      Anticipated Discharge Dispo: Discharge Disposition: D/T to home under HHA care in anticipation of covered skilled care (06)  Discharge Address: [address] [place] [address]  Discharge Contact Phone Number: [phone]    Action(s) Taken: Choice obtained    Spoke to patient and spouse at bedside. Introduced self and role. Discussed PT recommendation for home health and a FWW. Choice obtained for home health and FWW. Choice forms faxed to [facility]. No referrals sent at this time, need orders.

## 2024-04-22 NOTE — CARE PLAN
The patient is Stable - Low risk of patient condition declining or worsening    Shift Goals  Clinical Goals: Q4 neuros, NIHSS, monitor urine output, monitor heart rate and rhythm  Patient Goals: Rest  Family Goals: ALEXANDRU    Progress made toward(s) clinical / shift goals:    Problem: Knowledge Deficit - Standard  Goal: Patient and family/care givers will demonstrate understanding of plan of care, disease process/condition, diagnostic tests and medications  Outcome: Progressing   Patient updated on POC, patient verbalized an understanding. Patient educated on medications being administered. All questions answered at this time.   Problem: Fall Risk  Goal: Patient will remain free from falls  Outcome: Progressing   Bed locked and in lowest position. Call light within reach. Bed alarm in placed. Patient has remained free of falls during this shift.     Patient is not progressing towards the following goals:

## 2024-04-22 NOTE — PROGRESS NOTES
Telemetry Shift Summary     Rhythm: SR/SB with a first degree  HR: 59-71, touched a low of 48  Ectopy: r-o pac    Measurements: 0.22/0.08/0.42    Normal Values  Rhythm: SR  HR:   Measurements: 0.12-0.20/0.08-0.10/0.30-0.52

## 2024-04-23 ENCOUNTER — HOME HEALTH ADMISSION (OUTPATIENT)
Dept: HOME HEALTH SERVICES | Facility: HOME HEALTHCARE | Age: 79
End: 2024-04-23
Payer: MEDICARE

## 2024-04-23 PROBLEM — D56.3 THALASSEMIA MINOR: Status: ACTIVE | Noted: 2024-04-17

## 2024-04-23 LAB
ANION GAP SERPL CALC-SCNC: 13 MMOL/L (ref 7–16)
BASOPHILS # BLD AUTO: 0 % (ref 0–1.8)
BASOPHILS # BLD: 0 K/UL (ref 0–0.12)
BUN SERPL-MCNC: 31 MG/DL (ref 8–22)
CALCIUM SERPL-MCNC: 8.8 MG/DL (ref 8.4–10.2)
CHLORIDE SERPL-SCNC: 105 MMOL/L (ref 96–112)
CO2 SERPL-SCNC: 20 MMOL/L (ref 20–33)
CREAT SERPL-MCNC: 1.63 MG/DL (ref 0.5–1.4)
EOSINOPHIL # BLD AUTO: 0 K/UL (ref 0–0.51)
EOSINOPHIL NFR BLD: 0 % (ref 0–6.9)
ERYTHROCYTE [DISTWIDTH] IN BLOOD BY AUTOMATED COUNT: 34.9 FL (ref 35.9–50)
GFR SERPLBLD CREATININE-BSD FMLA CKD-EPI: 42 ML/MIN/1.73 M 2
GLUCOSE SERPL-MCNC: 97 MG/DL (ref 65–99)
HCT VFR BLD AUTO: 36.2 % (ref 42–52)
HGB BLD-MCNC: 11.3 G/DL (ref 14–18)
LDH SERPL L TO P-CCNC: 316 U/L (ref 107–266)
LYMPHOCYTES # BLD AUTO: 7.67 K/UL (ref 1–4.8)
LYMPHOCYTES NFR BLD: 54 % (ref 22–41)
MANUAL DIFF BLD: NORMAL
MCH RBC QN AUTO: 20.1 PG (ref 27–33)
MCHC RBC AUTO-ENTMCNC: 31.2 G/DL (ref 32.3–36.5)
MCV RBC AUTO: 64.4 FL (ref 81.4–97.8)
MONOCYTES # BLD AUTO: 0.85 K/UL (ref 0–0.85)
MONOCYTES NFR BLD AUTO: 6 % (ref 0–13.4)
NEUTROPHILS # BLD AUTO: 5.68 K/UL (ref 1.82–7.42)
NEUTROPHILS NFR BLD: 38 % (ref 44–72)
NEUTS BAND NFR BLD MANUAL: 2 % (ref 0–10)
NRBC # BLD AUTO: 0 K/UL
NRBC BLD-RTO: 0 /100 WBC (ref 0–0.2)
PLATELET # BLD AUTO: 177 K/UL (ref 164–446)
PLATELET BLD QL SMEAR: NORMAL
PMV BLD AUTO: 11.8 FL (ref 9–12.9)
POTASSIUM SERPL-SCNC: 4.3 MMOL/L (ref 3.6–5.5)
RBC # BLD AUTO: 5.62 M/UL (ref 4.7–6.1)
RBC BLD AUTO: NORMAL
SODIUM SERPL-SCNC: 138 MMOL/L (ref 135–145)
VARIANT LYMPHS BLD QL SMEAR: NORMAL
WBC # BLD AUTO: 14.2 K/UL (ref 4.8–10.8)

## 2024-04-23 PROCEDURE — 99233 SBSQ HOSP IP/OBS HIGH 50: CPT | Performed by: INTERNAL MEDICINE

## 2024-04-23 PROCEDURE — 700102 HCHG RX REV CODE 250 W/ 637 OVERRIDE(OP): Performed by: INTERNAL MEDICINE

## 2024-04-23 PROCEDURE — 85027 COMPLETE CBC AUTOMATED: CPT

## 2024-04-23 PROCEDURE — 85007 BL SMEAR W/DIFF WBC COUNT: CPT

## 2024-04-23 PROCEDURE — 94760 N-INVAS EAR/PLS OXIMETRY 1: CPT

## 2024-04-23 PROCEDURE — A9270 NON-COVERED ITEM OR SERVICE: HCPCS | Performed by: HOSPITALIST

## 2024-04-23 PROCEDURE — 36415 COLL VENOUS BLD VENIPUNCTURE: CPT

## 2024-04-23 PROCEDURE — A9270 NON-COVERED ITEM OR SERVICE: HCPCS | Performed by: INTERNAL MEDICINE

## 2024-04-23 PROCEDURE — 700102 HCHG RX REV CODE 250 W/ 637 OVERRIDE(OP): Performed by: HOSPITALIST

## 2024-04-23 PROCEDURE — 88185 FLOWCYTOMETRY/TC ADD-ON: CPT | Mod: 91

## 2024-04-23 PROCEDURE — 51798 US URINE CAPACITY MEASURE: CPT

## 2024-04-23 PROCEDURE — 83615 LACTATE (LD) (LDH) ENZYME: CPT

## 2024-04-23 PROCEDURE — 88184 FLOWCYTOMETRY/ TC 1 MARKER: CPT

## 2024-04-23 PROCEDURE — 770020 HCHG ROOM/CARE - TELE (206)

## 2024-04-23 PROCEDURE — 80048 BASIC METABOLIC PNL TOTAL CA: CPT

## 2024-04-23 RX ADMIN — TERAZOSIN 10 MG: 5 CAPSULE ORAL at 17:23

## 2024-04-23 RX ADMIN — AMIODARONE HYDROCHLORIDE 400 MG: 200 TABLET ORAL at 05:14

## 2024-04-23 RX ADMIN — APIXABAN 5 MG: 5 TABLET, FILM COATED ORAL at 17:22

## 2024-04-23 RX ADMIN — APIXABAN 5 MG: 5 TABLET, FILM COATED ORAL at 05:14

## 2024-04-23 RX ADMIN — CETIRIZINE HYDROCHLORIDE 10 MG: 10 TABLET, FILM COATED ORAL at 05:14

## 2024-04-23 RX ADMIN — ATORVASTATIN CALCIUM 40 MG: 40 TABLET, FILM COATED ORAL at 17:23

## 2024-04-23 RX ADMIN — AMIODARONE HYDROCHLORIDE 400 MG: 200 TABLET ORAL at 17:22

## 2024-04-23 RX ADMIN — DOCUSATE SODIUM 50MG AND SENNOSIDES 8.6MG 2 TABLET: 8.6; 5 TABLET, FILM COATED ORAL at 05:14

## 2024-04-23 ASSESSMENT — FIBROSIS 4 INDEX: FIB4 SCORE: 1.47

## 2024-04-23 ASSESSMENT — PAIN DESCRIPTION - PAIN TYPE
TYPE: ACUTE PAIN
TYPE: ACUTE PAIN

## 2024-04-23 NOTE — PROGRESS NOTES
Telemetry Shift Summary     Rhythm SB-SR  HR Range 57-74  Ectopy o-fPAC, rPVC, rCoup  Measurements  0.22/0.10/0.42     Normal Values  Rhythm SR  HR Range    Measurements 0.12-0.20 / 0.06-0.10  / 0.30-0.52

## 2024-04-23 NOTE — PROGRESS NOTES
OVERNIGHT HOSPITALIST:    I was notified by nursing staff that patient had a bladder scan with 565 mL this morning.  I added a Walden catheter to be placed.  Patient had straight cath yesterday.

## 2024-04-23 NOTE — CARE PLAN
The patient is Stable - Low risk of patient condition declining or worsening    Shift Goals  Clinical Goals: Bladder skin, monitor I/O, monitor retention  Patient Goals: Go home  Family Goals: ALEXANDRU    Progress made toward(s) clinical / shift goals:    Problem: Neuro Status  Goal: Neuro status will remain stable or improve  Outcome: Progressing     Problem: Dysphagia  Goal: Dysphagia will improve  Outcome: Progressing       Patient is not progressing towards the following goals:      Problem: Urinary Elimination  Goal: Establish and maintain regular urinary output  Outcome: Not Progressing  Note: Patient bladder scan at start of shift, 279 mL scanned, patient re-scanned, 565 mL scanned. Patient had approximately 100 mLs in urinal. MD notified, and orders received.

## 2024-04-23 NOTE — CARE PLAN
The patient is Stable - Low risk of patient condition declining or worsening    Shift Goals  Clinical Goals: monitor output; increase activity as tolerated; await panel results; monitor on tele  Patient Goals: go home  Family Goals: ALEXANDRU    Progress made toward(s) clinical / shift goals:    Problem: Knowledge Deficit - Standard  Goal: Patient and family/care givers will demonstrate understanding of plan of care, disease process/condition, diagnostic tests and medications  Outcome: Progressing  Note: Patient updated on plan of care. Monitor urinary output; monitor mentation; increase activity as tolerated. Family verbalized understanding.      Problem: Fall Risk  Goal: Patient will remain free from falls  Outcome: Progressing  Note: Appropriate interventions in place. Call light within reach, patient calls appropriately. .        Patient is not progressing towards the following goals:

## 2024-04-23 NOTE — DISCHARGE PLANNING
Case Management Discharge Planning    Admission Date: 4/16/2024  GMLOS: 1.9  ALOS: 4    6-Clicks ADL Score: 24  6-Clicks Mobility Score: 18      Anticipated Discharge Dispo: Discharge Disposition: D/T to home under A care in anticipation of covered skilled care (06)  Discharge Address: [address]; [place] [address]  Discharge Contact Phone Number: [phone]    DME Needed: Yes    DME Ordered: Yes    Action(s) Taken: Referral(s) sent    Home health and DME order placed by MD. Referral sent to [facility], per patient choice. Referral sent to [facility], per patient choice.

## 2024-04-23 NOTE — PROGRESS NOTES
Received report from Heena WHITE, at pt bedside. Patient alert and oriented to self and location, patient unable to correctly verbalize correct situation and time, but is able to with prompting. Pt reported no pain or discomfort, POC discussed, patient agreeable. Call light and belongings within reach. Bed locked and in low position. Alarm on and fall precautions in place.

## 2024-04-23 NOTE — PROGRESS NOTES
Telemetry Shift Summary     Rhythm SB/SR  HR Range 57-66  Ectopy rPAC as reported by monitor tech   Measurements 0.20/0.10/0.44           Normal Values  Rhythm SR  HR Range    Measurements 0.12-0.20 / 0.06-0.10  / 0.30-0.52

## 2024-04-23 NOTE — DISCHARGE PLANNING
ATTN: Case Management  RE: Referral for Home Health    As of 4.23.24, we have accepted the Home Health referral for the patient listed above.    A [facility] clinician will be out to see the patient within 48 hours. If you have any questions or concerns regarding the patient’s transition to Home Health, please do not hesitate to contact us at [phone].      We look forward to collaborating with you,  [facility] Team

## 2024-04-24 VITALS
WEIGHT: 197.97 LBS | RESPIRATION RATE: 18 BRPM | BODY MASS INDEX: 29.32 KG/M2 | DIASTOLIC BLOOD PRESSURE: 59 MMHG | TEMPERATURE: 97.6 F | HEIGHT: 69 IN | SYSTOLIC BLOOD PRESSURE: 118 MMHG | OXYGEN SATURATION: 96 % | HEART RATE: 58 BPM

## 2024-04-24 LAB
ACUTE LEUKEMIA MARKERS SPEC-IMP: NORMAL
EVENTS COUNTED SPEC: 26 MARKERS
SOURCE 9121: NORMAL

## 2024-04-24 PROCEDURE — A9270 NON-COVERED ITEM OR SERVICE: HCPCS | Performed by: HOSPITALIST

## 2024-04-24 PROCEDURE — A9270 NON-COVERED ITEM OR SERVICE: HCPCS | Performed by: INTERNAL MEDICINE

## 2024-04-24 PROCEDURE — 700102 HCHG RX REV CODE 250 W/ 637 OVERRIDE(OP): Performed by: INTERNAL MEDICINE

## 2024-04-24 PROCEDURE — 700102 HCHG RX REV CODE 250 W/ 637 OVERRIDE(OP): Performed by: HOSPITALIST

## 2024-04-24 PROCEDURE — 99239 HOSP IP/OBS DSCHRG MGMT >30: CPT | Performed by: INTERNAL MEDICINE

## 2024-04-24 RX ORDER — AMIODARONE HYDROCHLORIDE 400 MG/1
400 TABLET ORAL 2 TIMES DAILY
Qty: 60 TABLET | Refills: 0 | Status: SHIPPED | OUTPATIENT
Start: 2024-04-24 | End: 2024-04-26

## 2024-04-24 RX ADMIN — AMIODARONE HYDROCHLORIDE 400 MG: 200 TABLET ORAL at 06:34

## 2024-04-24 RX ADMIN — APIXABAN 5 MG: 5 TABLET, FILM COATED ORAL at 06:33

## 2024-04-24 RX ADMIN — CETIRIZINE HYDROCHLORIDE 10 MG: 10 TABLET, FILM COATED ORAL at 06:33

## 2024-04-24 ASSESSMENT — PAIN DESCRIPTION - PAIN TYPE: TYPE: ACUTE PAIN

## 2024-04-24 NOTE — PROGRESS NOTES
Telemetry Shift Summary     Rhythm SB/SR/ST 1st degree HB  HR Range   Ectopy o-fPAC; oBig  Measurements 0.22/0.10/0.42           Normal Values  Rhythm SR  HR Range    Measurements 0.12-0.20 / 0.06-0.10  / 0.30-0.52

## 2024-04-24 NOTE — PROGRESS NOTES
Telemetry Shift Summary     Rhythm SB-SR  HR Range 53-66  Ectopy r-oPAC  Measurements 0.24/0.08/0.48      Normal Values  Rhythm SR  HR Range    Measurements 0.12-0.20 / 0.06-0.10  / 0.30-0.52

## 2024-04-24 NOTE — PROGRESS NOTES
[facility] Medicine Daily Progress Note    Date of Service  4/23/2024    Chief Complaint  Hardy Roca is a 78 y.o. male admitted 4/16/2024 with   Chief Complaint   Patient presents with    Dizziness     Woke up this am dizzy and lightheaded. Laid in bed all day. Pt gets more dizzy with movement. Denies neuro deficits, unilateral weakness, no recent falls, trauma or illness. Pt has had vertigo in past but this feels different per patient.        Hospital Course  Patient is a 78-year-old male with A-fib on Eliquis who came in with dizziness.  Patient's symptoms were worse when he woke up the day of admission.  Felt the sensation of things spinning around him.  He stood up from the bed but fell back onto the bed secondary to the dizziness.  No headache weakness or visual abnormalities other than he closes his right eye when trying to focus.  Patient states he is unaware that he is closing his right eye.  Patient underwent MRI of the brain which showed no evidence of stroke but showed chronic microvascular changes and some siderosis on the right frontal parietal lobe.  Patient denies any injury to this area.  Patient was evaluated by physical therapy who was able to elicit a positive Omayra-Hallpike.  He underwent Epley maneuver both 4/17 and again today on 4/18.  Does have improvement of symptoms after this manipulation.  I will also start him on scheduled Zyrtec and as needed Antivert to see if symptoms improved.  He also had urinary retention as his terazosin was not initiated on admission and required Walden catheter.  Hopefully after 24 to 48 hours of terazosin he will no longer require catheterization.    Interval Problem Update  Patient had urinary retention overnight 500 mL, required a Walden catheter placement  - I discussed case with patient, wife at bedside.  Data reviewed labs, WBC 14.2, He has had persistent leukocytosis since 01/13/2022.  Differential shows increased lymphocyte.  - Creatinine 1.63, LDH  obtained today 316, elevated.  - Panel PCR negative  - I reviewed CT scan, there appears to be no evidence of lymphoma or adenopathy.  Patient has mild splenomegaly.  There is small bilateral pleural effusions.  Bladder may have a bladder calculi, small right renal calculi.  Enlarged prostate.    I have discussed this patient's plan of care and discharge plan at IDT rounds today with Case Management, Nursing, Nursing leadership, and other members of the IDT team.    Consultants/Specialty  None    Code Status  Full Code    Disposition  The patient is not medically cleared for discharge to home or a post-acute facility.      I have placed the appropriate orders for post-discharge needs.    Review of Systems  Review of Systems   Genitourinary:  Positive for dysuria.        Physical Exam  Temp:  [36.4 °C (97.5 °F)-36.6 °C (97.9 °F)] 36.5 °C (97.7 °F)  Pulse:  [62-66] 62  Resp:  [16-18] 18  BP: (111-127)/(56-64) 123/61  SpO2:  [94 %-97 %] 97 %    Physical Exam  Vitals and nursing note reviewed.   Constitutional:       General: He is not in acute distress.     Comments: Frail, thin appearing elderly male   HENT:      Head: Normocephalic and atraumatic.      Comments: Temporal muscle wasting noted     Nose: Nose normal. No congestion.   Eyes:      General: No scleral icterus.     Extraocular Movements: Extraocular movements intact.   Cardiovascular:      Rate and Rhythm: Normal rate and regular rhythm.      Pulses: Normal pulses.      Heart sounds: Normal heart sounds. No murmur heard.  Pulmonary:      Effort: Pulmonary effort is normal. No respiratory distress.      Breath sounds: Normal breath sounds.   Abdominal:      General: Abdomen is flat. Bowel sounds are normal. There is no distension.      Palpations: Abdomen is soft.   Genitourinary:     Comments: Walden catheter in place  Musculoskeletal:         General: No swelling.      Cervical back: Normal range of motion and neck supple.      Comments: Sarcopenic. B/L  dorsal hands visible muscle atrophy.   Skin:     General: Skin is warm.      Capillary Refill: Capillary refill takes less than 2 seconds.      Coloration: Skin is not jaundiced.   Neurological:      General: No focal deficit present.      Mental Status: He is alert and oriented to person, place, and time. Mental status is at baseline.      Motor: Weakness present.   Psychiatric:         Mood and Affect: Mood normal.         Behavior: Behavior normal.         Thought Content: Thought content normal.         Judgment: Judgment normal.         Fluids    Intake/Output Summary (Last 24 hours) at 4/23/2024 2003  Last data filed at 4/23/2024 1727  Gross per 24 hour   Intake 1200 ml   Output 1300 ml   Net -100 ml       Laboratory  Recent Labs     04/21/24  0121 04/22/24  0112 04/23/24  0117   WBC 17.9* 18.0* 14.2*   RBC 6.12* 5.99 5.62   HEMOGLOBIN 12.2* 11.9* 11.3*   HEMATOCRIT 39.0* 38.7* 36.2*   MCV 63.7* 64.6* 64.4*   MCH 19.9* 19.9* 20.1*   MCHC 31.3* 30.7* 31.2*   RDW 34.7* 34.9* 34.9*   PLATELETCT 204 201 177   MPV 11.2 11.4 11.8     Recent Labs     04/21/24  0121 04/22/24  0112 04/23/24  0117   SODIUM 135 135 138   POTASSIUM 4.0 4.0 4.3   CHLORIDE 105 104 105   CO2 17* 19* 20   GLUCOSE 114* 103* 97   BUN 27* 31* 31*   CREATININE 1.51* 1.70* 1.63*   CALCIUM 8.7 8.8 8.8                   Imaging  CT-CHEST,ABDOMEN,PELVIS W/O   Final Result      1.  No adenopathy or other evidence for lymphoma within the chest, abdomen, or pelvis      2.  Mild splenomegaly, nonspecific      3.  Small bilateral pleural effusion      4.  14 mm bladder calculus and there are tiny nonobstructive right renal calculi      5.  Incidental right renal cyst      6.  Diffuse atherosclerotic plaque      7.  Marked enlargement of the prostate      MR-BRAIN-W/O   Final Result         Age-related volume loss and chronic microvascular ischemic changes.      Superficial siderosis noted in the right frontoparietal cortical surface. This could be related  to remote intracranial traumatic injury or remote subarachnoid hemorrhage. Also, in the appropriate clinical setting, this can be seen with underlying amyloid    angiopathy.      EC-ECHOCARDIOGRAM COMPLETE W/O CONT   Final Result      CT-CTA HEAD WITH & W/O-POST PROCESS   Final Result         1.  No large vessel occlusion or aneurysm identified.      DX-CHEST-PORTABLE (1 VIEW)   Final Result      No evidence of acute cardiopulmonary process.           Assessment/Plan  * Dizziness- (present on admission)  Assessment & Plan  Had some improvement and had a positive vestibular sign on Omayra-Hallpike maneuver, status post Epley with [facility] PT and will have another 1 again today  No evidence of stroke on MRI, continue with home dose anticoagulation  Chronic microvascular changes and there is evidence of right frontoparietal siderosis, patient denies trauma to this area  Questionable occult infection given persistent leukocytosis without source, urinary would be the most likely, UA on 4/16 and 4/17 showed leuk esterase    Patient's dizziness still present  but may have been due to urinary retention issues as well.  Meclizine as needed    Advance care planning  Assessment & Plan  Patient wants to be full code    Leukocytosis- (present on admission)  Assessment & Plan  WBC 14.2  Flow cytometry pending  No bone marrow biopsy recommended by Dr. Berry unless flow cytometry results are positive for lymphoma or hematologic condition  - Creatinine 1.63, LDH obtained today 316, elevated.      Thalassemia minor- (present on admission)  Assessment & Plan  Hemoglobin 11.3  Patient states he has thalassemia minor  Confirmed on Mentzer index    Dyslipidemia- (present on admission)  Assessment & Plan  Continue on atorvastatin    Anticoagulated- (present on admission)  Assessment & Plan  Continue on p.o. Eliquis    BPH (benign prostatic hypertrophy)- (present on admission)  Assessment & Plan  Patient had urinary retention overnight 500 mL,  required a Walden catheter placement    Atrial flutter (HCC)- (present on admission)  Assessment & Plan  No improvement with digoxin, discontinue    Continue patient on amiodarone p.o., metoprolol and apixaban.         VTE prophylaxis:    therapeutic anticoagulation with eliquis 5 mg BID      I have performed a physical exam and reviewed and updated ROS and Plan today (4/23/2024). In review of yesterday's note (4/22/2024), there are no changes except as documented above.      Total time spent 51 minutes. I spent greater than 50% of the time for patient care, including unit/floor time, multiple face-to-face encounters with the patient, counseling on treatment plan and discussion with bedside RN.  Further, I spent time on my own review of patient's overnight events, RN notes, imaging and lab analysis, and developing my assessment and plan above.  In addition, working with , social workers, and Charge RN on case coordination on this date.

## 2024-04-24 NOTE — CARE PLAN
The patient is Stable - Low risk of patient condition declining or worsening    Shift Goals  Clinical Goals: Monitor vital signs, output, and mentation  Patient Goals: Eat, rest, go home  Family Goals: ALEXANDRU    Progress made toward(s) clinical / shift goals:    Problem: Neuro Status  Goal: Neuro status will remain stable or improve  Outcome: Progressing  Note: Patient assessed, oriented x2-3, self, place, and time. Symmetrical face with expressions, eyes PERRLA, bilateral equal strength.      Problem: Urinary Elimination  Goal: Establish and maintain regular urinary output  Outcome: Progressing  Note: Cox assessed, output monitored, patient reported no discomfort or pain with cox.      Problem: Hemodynamic Monitoring  Goal: Patient's hemodynamics, fluid balance and neurologic status will be stable or improve  Outcome: Progressing       Patient is not progressing towards the following goals:

## 2024-04-24 NOTE — PROGRESS NOTES
Patient discharged via private vehicle with spouse and all personal belongings at 1350. Patient and spouse educated on follow up care, patient to schedule follow up apt with PCP and urology. Referral to onc, copy given to patient. Patient and patients spouse verbalized understanding of discharge plan and follow up. Education on managing catheter at home completed, spouse verbalized understanding and demonstrated proper management. Education completed in regard to when to return to the ER. Patient escorted to private vehicle via wheelchair.

## 2024-04-24 NOTE — CARE PLAN
The patient is Stable - Low risk of patient condition declining or worsening    Shift Goals  Clinical Goals: monitor vitals; monitor mentation; await panel results; increase activity as tolerated  Patient Goals: go home  Family Goals: ALEXANDRU    Progress made toward(s) clinical / shift goals:    Problem: Urinary Elimination  Goal: Establish and maintain regular urinary output  Outcome: Not Progressing  Note: Walden in place; patient failed previous voiding trial. Continue Hytrin.        Patient is not progressing towards the following goals:      Problem: Urinary Elimination  Goal: Establish and maintain regular urinary output  Outcome: Not Progressing  Note: Walden in place; patient failed previous voiding trial. Continue Hytrin.

## 2024-04-24 NOTE — DISCHARGE SUMMARY
Discharge Summary    CHIEF COMPLAINT ON ADMISSION  Chief Complaint   Patient presents with    Dizziness     Woke up this am dizzy and lightheaded. Laid in bed all day. Pt gets more dizzy with movement. Denies neuro deficits, unilateral weakness, no recent falls, trauma or illness. Pt has had vertigo in past but this feels different per patient.        Reason for Admission  EMS     Admission Date  4/16/2024    CODE STATUS  Full Code    HPI & HOSPITAL COURSE  This is Patient is a 78-year-old male with A-fib on Eliquis who came in with dizziness.  Patient's symptoms were worse when he woke up the day of admission.  Felt the sensation of things spinning around him.  He stood up from the bed but fell back onto the bed secondary to the dizziness.  No headache weakness or visual abnormalities other than he closes his right eye when trying to focus.  Patient states he is unaware that he is closing his right eye.  Patient underwent MRI of the brain which showed no evidence of stroke but showed chronic microvascular changes and some siderosis on the right frontal parietal lobe.  Patient denies any injury to this area.  Patient was evaluated by physical therapy who was able to elicit a positive Washington-Hallpike.  He underwent Epley maneuver both 4/17 and again today on 4/18.  Does have improvement of symptoms after this manipulation.  I will also start him on scheduled Zyrtec and as needed Antivert to see if symptoms improved.  He also had urinary retention as his terazosin was not initiated on admission and required Walden catheter.  Hopefully after 24 to 48 hours of terazosin he will no longer require catheterization.    - I discussed case with patient, wife at bedside.  Data reviewed labs, WBC 14.2, He has had persistent leukocytosis since 01/13/2022.  Differential shows increased lymphocyte.  - Creatinine 1.63, LDH obtained today 316, elevated.  - Panel PCR negative  - I reviewed CT scan, there appears to be no evidence of  "lymphoma or adenopathy.  Patient has mild splenomegaly.  There is small bilateral pleural effusions.  Bladder may have a bladder calculi, small right renal calculi. + Enlarged prostate.    Patient required a Walden catheter insertion 4/23.  We were able to establish home health in order to help assist with care for the Walden catheter.  Patient follows with urology [place], I have asked wife to make an appointment in order to have follow-up for his BPH.    I did refer the patient to oncology as we are awaiting a flow cytometry that was obtained by previous hospitalist.  At this time it is still pending on lab work.      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 Positive: Kappa, CD5, CD19, CD20, CD23, partial CD38, CD45,      Negative: Lambda, CD10   Aberrancy: CD5     ANALYSIS   Differential:   Lymphocytes: 50%   Monocytes: 6.3%   Granulocytes: 43%     Blasts: 0.040%     Lymphocytes:   B-cells: 63%   T-cells: 33%   NK-cells: 4.4%       4/26/24: Addendum: I called wife Joanna Roca.  I gave update on flow cytometry results.  I explained there are positive findings and they will need to call Monday to ensure they have a follow-up with hematology/oncology.  I have provided an outpatient referral for them to oncology, I explained that there are 2 groups in the area with [facility] or cancer care specialist.  - I saw outside notes patient was needing refill on his amiodarone.  I explained to wife and I apologized that I did not give any refills but I have ordered refills to their outpatient [facility].      Therefore, he is discharged in fair and stable condition to home with organized home healthcare and close outpatient follow-up.    The patient met 2-midnight criteria for an inpatient stay at the time of discharge.    Discharge Date  04/24/24    FOLLOW UP ITEMS POST DISCHARGE  With urology   [facility]      DISCHARGE DIAGNOSES  Principal Problem:    " Dizziness (POA: Yes)  Active Problems:    Atrial flutter (HCC) (POA: Yes)    BPH (benign prostatic hypertrophy) (POA: Yes)      Overview: IMO Update    Anticoagulated (POA: Yes)    Dyslipidemia (POA: Yes)    Thalassemia minor (POA: Yes)    Leukocytosis (POA: Yes)    Advance care planning (POA: Clinically Undetermined)  Resolved Problems:    * No resolved hospital problems. *      FOLLOW UP  No future appointments.  [facility]  [address] [address] [address]  [phone]        Long Eddy D.O.  [address] [address]  [phone]    Schedule an appointment as soon as possible for a visit      Long Eddy D.O.  [address] [id number] [phone]            MEDICATIONS ON DISCHARGE     Medication List        START taking these medications        Instructions   amiodarone 400 MG tablet  Commonly known as: Pacerone   Take 1 Tablet by mouth 2 times a day for 30 days.  Dose: 400 mg            CONTINUE taking these medications        Instructions   apixaban 5mg Tabs  Commonly known as: Eliquis   Take 1 Tablet by mouth 2 times a day.  Dose: 5 mg     aspirin 325 MG Tabs  Commonly known as: Asa   Take 325 mg by mouth every 6 hours as needed. Indications: Pain  Dose: 325 mg     escitalopram 10 MG Tabs  Commonly known as: Lexapro   Take 10 mg by mouth every day.  Dose: 10 mg     metoprolol SR 25 MG Tb24  Commonly known as: Toprol XL   Take 1 Tablet by mouth every day.  Dose: 25 mg     OCUVITE PO   Take 1 Capsule by mouth every day.  Dose: 1 Capsule     simvastatin 20 MG Tabs  Commonly known as: Zocor   Take 1 Tab by mouth every evening.  Dose: 20 mg     terazosin 10 MG capsule  Commonly known as: Hytrin   Take 10 mg by mouth every evening.  Dose: 10 mg              Allergies  No Known Allergies    DIET  Orders Placed This Encounter   Procedures    Diet Order Diet: Cardiac     Standing Status:   Standing     Number of Occurrences:   1     Order  Specific Question:   Diet:     Answer:   Cardiac [6]       ACTIVITY  As tolerated.  Weight bearing as tolerated    CONSULTATIONS  None    PROCEDURES  None    LABORATORY  Lab Results   Component Value Date    SODIUM 138 04/23/2024    POTASSIUM 4.3 04/23/2024    CHLORIDE 105 04/23/2024    CO2 20 04/23/2024    GLUCOSE 97 04/23/2024    BUN 31 (H) 04/23/2024    CREATININE 1.63 (H) 04/23/2024        Lab Results   Component Value Date    WBC 14.2 (H) 04/23/2024    HEMOGLOBIN 11.3 (L) 04/23/2024    HEMATOCRIT 36.2 (L) 04/23/2024    PLATELETCT 177 04/23/2024      CT-CHEST,ABDOMEN,PELVIS W/O   Final Result      1.  No adenopathy or other evidence for lymphoma within the chest, abdomen, or pelvis      2.  Mild splenomegaly, nonspecific      3.  Small bilateral pleural effusion      4.  14 mm bladder calculus and there are tiny nonobstructive right renal calculi      5.  Incidental right renal cyst      6.  Diffuse atherosclerotic plaque      7.  Marked enlargement of the prostate      MR-BRAIN-W/O   Final Result         Age-related volume loss and chronic microvascular ischemic changes.      Superficial siderosis noted in the right frontoparietal cortical surface. This could be related to remote intracranial traumatic injury or remote subarachnoid hemorrhage. Also, in the appropriate clinical setting, this can be seen with underlying amyloid    angiopathy.      EC-ECHOCARDIOGRAM COMPLETE W/O CONT   Final Result      CT-CTA HEAD WITH & W/O-POST PROCESS   Final Result         1.  No large vessel occlusion or aneurysm identified.      DX-CHEST-PORTABLE (1 VIEW)   Final Result      No evidence of acute cardiopulmonary process.          Total time of the discharge process exceeds 32 minutes.

## 2024-04-24 NOTE — PROGRESS NOTES
"Received report from Heena WHITE. Patient alert and oriented to self and place. Patient verbalized correct month but stated it was \"2023\". Pt reported no pain or discomfort, POC discussed. Call light and belongings within reach. Bed locked and in low position. Alarm on and fall precautions in place.    "

## 2024-04-26 ENCOUNTER — TELEPHONE (OUTPATIENT)
Dept: CARDIOLOGY | Facility: MEDICAL CENTER | Age: 79
End: 2024-04-26
Payer: MEDICARE

## 2024-04-26 ENCOUNTER — HOME CARE VISIT (OUTPATIENT)
Dept: HOME HEALTH SERVICES | Facility: HOME HEALTHCARE | Age: 79
End: 2024-04-26
Payer: MEDICARE

## 2024-04-26 ENCOUNTER — DOCUMENTATION (OUTPATIENT)
Dept: MEDICAL GROUP | Facility: PHYSICIAN GROUP | Age: 79
End: 2024-04-26
Payer: MEDICARE

## 2024-04-26 VITALS
DIASTOLIC BLOOD PRESSURE: 68 MMHG | HEIGHT: 68 IN | HEART RATE: 61 BPM | OXYGEN SATURATION: 97 % | TEMPERATURE: 98.2 F | SYSTOLIC BLOOD PRESSURE: 138 MMHG | RESPIRATION RATE: 16 BRPM | BODY MASS INDEX: 30.1 KG/M2

## 2024-04-26 DIAGNOSIS — I48.92 PAROXYSMAL ATRIAL FLUTTER (HCC): ICD-10-CM

## 2024-04-26 PROCEDURE — 665005 NO-PAY RAP - HOME HEALTH

## 2024-04-26 PROCEDURE — G0299 HHS/HOSPICE OF RN EA 15 MIN: HCPCS

## 2024-04-26 RX ORDER — AMIODARONE HYDROCHLORIDE 400 MG/1
400 TABLET ORAL 2 TIMES DAILY
Qty: 60 TABLET | Refills: 3 | Status: SHIPPED | OUTPATIENT
Start: 2024-04-26 | End: 2024-04-29

## 2024-04-26 SDOH — ECONOMIC STABILITY: HOUSING INSECURITY: HOME SAFETY: SN EDUCATED PT/CG IN REGARDS TO FALL PREVENTION USING HANDOUT IN WHITE BINDER.

## 2024-04-26 ASSESSMENT — ACTIVITIES OF DAILY LIVING (ADL)
TOILETING: MINIMUM ASSIST
PHYSICAL TRANSFERS ASSESSED: 1
TOILETING: ONE PERSON
DRESSING_LB_CURRENT_FUNCTION: MINIMUM ASSIST
OASIS_M1830: 03
FEEDING: INDEPENDENT
AMBULATION ASSISTANCE: 1
DRESSING_UB_CURRENT_FUNCTION: SUPERVISION
TRANSPORTATION COMMENTS: PT NEEDS ASSISTANCE TO LEAVE HOME
GROOMING_CURRENT_FUNCTION: STAND BY ASSIST
GROOMING ASSESSED: 1
FEEDING ASSESSED: 1
TELEPHONE USE ASSESSED: 1
AMBULATION ASSISTANCE: SUPERVISION
BATHING_CURRENT_FUNCTION: ONE PERSON
BATHING ASSESSED: 1
TOILETING: 1
BATHING_CURRENT_FUNCTION: MINIMUM ASSIST
ORAL_CARE_ASSESSED: 1

## 2024-04-26 ASSESSMENT — ENCOUNTER SYMPTOMS
DIZZINESS: 1
PERSON REPORTING PAIN: PATIENT
HIGHEST PAIN SEVERITY IN PAST 24 HOURS: 0/10
DYSPNEA ON EXERTION: 1
STOOL FREQUENCY: DAILY
SKIN LESIONS: 1
LAST BOWEL MOVEMENT: 66955
DIFFICULTY THINKING: 1
PAIN SEVERITY GOAL: 0/10
DENIES PAIN: 1
BOWEL PATTERN NORMAL: 1
FORGETFULNESS: 1
SUBJECTIVE PAIN PROGRESSION: UNCHANGED
BLURRED VISION: 1
DESCRIPTION OF MEMORY LOSS: SHORT TERM
FATIGUES EASILY: 1
NAUSEA: PT DENIES
SHORTNESS OF BREATH: 1
LOWEST PAIN SEVERITY IN PAST 24 HOURS: 0/10
VOMITING: PT DENIES
FATIGUE: 1

## 2024-04-26 NOTE — PROGRESS NOTES
Medication chart review for [facility] services    Received referral from [facility].   Medications reviewed  compared with discharge summary if available.  Discharge summary date, if applicable:   4/24    Current medication list per [facility]     Medication list one, patient is currently taking    Current Outpatient Medications:     Cholecalciferol, 1 Capsule, Oral, DAILY    B-12, 1 Capsule, Oral, DAILY    ascorbic acid, 1,000 mg, Oral, DAILY    amiodarone, 400 mg, Oral, BID    escitalopram, 10 mg, Oral, DAILY    metoprolol SR, 25 mg, Oral, DAILY (Patient not taking: Reported on 4/26/2024)    apixaban, 5 mg, Oral, BID    Multiple Vitamins-Minerals (OCUVITE PO), 1 Capsule, Oral, DAILY    terazosin, 10 mg, Oral, Q EVENING    simvastatin, 20 mg, Oral, Q EVENING      Medication list two, drugs that the patient has been prescribed or recommended to take by their healthcare provider on discharge summary             MEDICATIONS ON DISCHARGE      Medication List          START taking these medications         Instructions   amiodarone 400 MG tablet  Commonly known as: Pacerone    Take 1 Tablet by mouth 2 times a day for 30 days.  Dose: 400 mg                CONTINUE taking these medications         Instructions   apixaban 5mg Tabs  Commonly known as: Eliquis    Take 1 Tablet by mouth 2 times a day.  Dose: 5 mg      aspirin 325 MG Tabs  Commonly known as: Asa    Take 325 mg by mouth every 6 hours as needed. Indications: Pain  Dose: 325 mg      escitalopram 10 MG Tabs  Commonly known as: Lexapro    Take 10 mg by mouth every day.  Dose: 10 mg      metoprolol SR 25 MG Tb24  Commonly known as: Toprol XL    Take 1 Tablet by mouth every day.  Dose: 25 mg      OCUVITE PO    Take 1 Capsule by mouth every day.  Dose: 1 Capsule      simvastatin 20 MG Tabs  Commonly known as: Zocor    Take 1 Tab by mouth every evening.  Dose: 20 mg      terazosin 10 MG capsule  Commonly known as: Hytrin    Take 10 mg by mouth every  evening.  Dose: 10 mg           No Known Allergies    Labs     Lab Results   Component Value Date/Time    SODIUM 138 04/23/2024 01:17 AM    POTASSIUM 4.3 04/23/2024 01:17 AM    CHLORIDE 105 04/23/2024 01:17 AM    CO2 20 04/23/2024 01:17 AM    GLUCOSE 97 04/23/2024 01:17 AM    BUN 31 (H) 04/23/2024 01:17 AM    CREATININE 1.63 (H) 04/23/2024 01:17 AM     Lab Results   Component Value Date/Time    ALKPHOSPHAT 35 04/16/2024 07:24 PM    ASTSGOT 13 04/16/2024 07:24 PM    ALTSGPT 12 04/16/2024 07:24 PM    TBILIRUBIN 0.4 04/16/2024 07:24 PM    INR 1.21 (H) 04/16/2024 07:24 PM    ALBUMIN 4.0 04/16/2024 07:24 PM        Assessment for clinically significant drug interactions, drug omissions/additions, duplicative therapies.            CC   Long Eddy D.O.  [address] [address]  Fax: [phone]    [facility]  Phone [phone] fax [phone]    This note was created using voice recognition software (Dragon). The accuracy of the dictation is limited by the abilities of the software. I have reviewed the note prior to signing, however some errors in grammar and context are still possible. If you have any questions related to this note please do not hesitate to contact our office.

## 2024-04-26 NOTE — TELEPHONE ENCOUNTER
ARMINDA    Caller: Cammie - wife     Topic/issue: Patient was in the hospital until 4/24 and while there her  was prescribed amiodarone (PACERONE) 400 MG tablet, but they only have 20 days remaining with no refills. Cammie wants to know if her  should continue to take this and if so they will need a new prescription for it ordered. Also wanted to update on the metoprolol SR (TOPROL XL) 25 MG TABLET SR 24 HR that Williams wanted her  to try, he has not started taking it yet because of his hospital stay. Should he start taking it now? Please advise.      Callback Number: [phone]    Thank you,  Nat LOZA

## 2024-04-26 NOTE — Clinical Note
Vernell, please send to Dr. Long Eddy at [phone]. Thank you    Primary dx/Skilled need: Pt with recent hospitalization for dizziness, s/p epley maneuver on 4/17 and 4/18, and urinary retention/BPH, cox catheter. Hx of atria premature depolarization, thalassemia, PACs PVCs, dyslipidemia, Atrial flutter, A fib, Leukocytosis.   SN frequency: 1w3 for education regarding disease process, medications, home safety and wound care.  Zip code: [id number]  Disciplines ordered: PT: 1w1 eval and treat; OT: 1w1 eval and treat  Insurance & authorization: [facility]  Certification period: 4/26/24 to 6/24/24  Special consideration:  Pt requested SN once a week for 3 weeks for continued education and assessments.

## 2024-04-26 NOTE — TELEPHONE ENCOUNTER
AB as member of care team, see pt's wife's questions at call in. Pt's recent hospital notes state pt discharged with amiodarone (new) and metoprol (previously prescribed by ARMINDA).     Pt went to hospital 4/16/24 after waking with dizziness/light-headedness. This improved with epley maneuver. SBP varied 140s-160s HR 60's-80's. Pt recommended to see oncology for f/u with BPH.     Pt asking for us to send in amiodarone if pt to continue and asking if pt should continue metoprolol. I will advise yes on both per ED recommendations as long as I can confirm vitals are stable, need provider approval to send amiodarone.

## 2024-04-28 ENCOUNTER — HOME CARE VISIT (OUTPATIENT)
Dept: HOME HEALTH SERVICES | Facility: HOME HEALTHCARE | Age: 79
End: 2024-04-28
Payer: MEDICARE

## 2024-04-29 ENCOUNTER — HOME CARE VISIT (OUTPATIENT)
Dept: HOME HEALTH SERVICES | Facility: HOME HEALTHCARE | Age: 79
End: 2024-04-29
Payer: MEDICARE

## 2024-04-29 VITALS
TEMPERATURE: 98.4 F | SYSTOLIC BLOOD PRESSURE: 110 MMHG | DIASTOLIC BLOOD PRESSURE: 70 MMHG | OXYGEN SATURATION: 94 % | HEART RATE: 66 BPM | RESPIRATION RATE: 14 BRPM

## 2024-04-29 PROCEDURE — G0299 HHS/HOSPICE OF RN EA 15 MIN: HCPCS

## 2024-04-29 RX ORDER — AMIODARONE HYDROCHLORIDE 200 MG/1
200 TABLET ORAL DAILY
Qty: 90 TABLET | Refills: 0 | Status: SHIPPED | OUTPATIENT
Start: 2024-04-29

## 2024-04-29 ASSESSMENT — ENCOUNTER SYMPTOMS
PERSON REPORTING PAIN: PATIENT
FORGETFULNESS: 1
MUSCLE WEAKNESS: 1
DIZZINESS: 1
VOMITING: DENIES
FATIGUES EASILY: 1
DYSPNEA ON EXERTION: 1
DENIES PAIN: 1
NAUSEA: DENIES
LAST BOWEL MOVEMENT: 66958

## 2024-04-29 ASSESSMENT — PATIENT HEALTH QUESTIONNAIRE - PHQ9: CLINICAL INTERPRETATION OF PHQ2 SCORE: 0

## 2024-04-29 NOTE — CASE COMMUNICATION
noted  ----- Message -----  From: Marcelle Guardado R.N.  Sent: 4/26/2024  10:30 AM PDT  To: Celine Garcia R.N.; Opal Silverio R.N.; *      Vernell, please send to Dr. Long Eddy at [phone]. Thank you    Primary dx/Skilled need: Pt with recent hospitalization for dizziness, s/p epley maneuver on 4/17 and 4/18, and urinary retention/BPH, cox catheter. Hx of atria premature depolarization, thalassemia, PACs PVCs, dyslipidemia,  Atrial flutter, A fib, Leukocytosis.   SN frequency: 1w3 for education regarding disease process, medications, home safety and wound care.  Zip code: [id number]  Disciplines ordered: PT: 1w1 eval and treat; OT: 1w1 eval and treat  Insurance & authorization: [facility]  Certification period: 4/26/24 to 6/24/24  Special consideration:  Pt requested SN once a week for 3 weeks for continued education and assessments.

## 2024-04-29 NOTE — TELEPHONE ENCOUNTER
He can drop Amiodarone dose to 200mg once daily.  OK to send in new Rx.  Continue Toprol XL 25mg once daily.  Watch BP and HR.  Should probably get FU with cardiology in the next month.  Thanks, AB

## 2024-04-29 NOTE — TELEPHONE ENCOUNTER
Phone Number Called: [phone]    Call outcome: Spoke to patient spouse Joanna regarding message below.    Message: Called to relay AB recommendations. She verified medication changes and verbalized them back. Scheduled to see QUENTIN on 05/13. Answered all questions and concerns, appreciative of call.       RX ordered.

## 2024-04-30 ENCOUNTER — TELEPHONE (OUTPATIENT)
Dept: HEALTH INFORMATION MANAGEMENT | Facility: OTHER | Age: 79
End: 2024-04-30
Payer: MEDICARE

## 2024-05-01 ENCOUNTER — HOME CARE VISIT (OUTPATIENT)
Dept: HOME HEALTH SERVICES | Facility: HOME HEALTHCARE | Age: 79
End: 2024-05-01
Payer: MEDICARE

## 2024-05-01 VITALS
DIASTOLIC BLOOD PRESSURE: 58 MMHG | OXYGEN SATURATION: 96 % | HEART RATE: 62 BPM | SYSTOLIC BLOOD PRESSURE: 92 MMHG | TEMPERATURE: 97.9 F | RESPIRATION RATE: 16 BRPM

## 2024-05-01 ASSESSMENT — ACTIVITIES OF DAILY LIVING (ADL)
GROOMING_COMMENTS: SEE OT NOTES
AMBULATION ASSISTANCE ON FLAT SURFACES: 1
PHYSICAL_TRANSFERS_DEVICES: USE OF B UES TO ASSIST
BATHING_COMMENTS: SEE OT NOTES
FEEDING_COMMENTS: SEE OT NOTES
AMBULATION ASSISTANCE: 1
CURRENT_FUNCTION: STAND BY ASSIST
PHYSICAL TRANSFERS ASSESSED: 1
AMBULATION ASSISTANCE: STAND BY ASSIST

## 2024-05-01 ASSESSMENT — ENCOUNTER SYMPTOMS
PAIN SEVERITY GOAL: 0/10
LOWEST PAIN SEVERITY IN PAST 24 HOURS: 0/10
HIGHEST PAIN SEVERITY IN PAST 24 HOURS: 0/10
MUSCLE WEAKNESS: 1
PERSON REPORTING PAIN: PATIENT
DENIES PAIN: 1

## 2024-05-02 ENCOUNTER — HOME CARE VISIT (OUTPATIENT)
Dept: HOME HEALTH SERVICES | Facility: HOME HEALTHCARE | Age: 79
End: 2024-05-02
Payer: MEDICARE

## 2024-05-02 VITALS
RESPIRATION RATE: 15 BRPM | OXYGEN SATURATION: 96 % | HEART RATE: 49 BPM | SYSTOLIC BLOOD PRESSURE: 96 MMHG | TEMPERATURE: 97.4 F | DIASTOLIC BLOOD PRESSURE: 48 MMHG

## 2024-05-02 NOTE — CASE COMMUNICATION
Quality Review Completed for 4/26 SOC OASIS by OPAL Mcnair RN on 5/2/2024:     Edits completed by OPAL Mcnair RN:     1.  and  diagnosis coding updated per chart review  2.  CG reports cog decline lately, changed  add 5  3.  is 1 for memory deficit and  several times a week per reports  4. Per narrative min level of assist functional status changed  and 1810 to 2,  to 2,  to 3 for lack of safet y grab bars in shower area  5. D, E, F, I, J changed to #3 per narrative min level of asssit  6.  changed to 3 per guidelines when ambulation is with assistance  7. Checked exercises prescribed to Activities Permitted on 485 form.  Checked ambulate only w/assist, bleeding precautions to 485 Safety Measures

## 2024-05-02 NOTE — CASE COMMUNICATION
noted  ----- Message -----  From: Ifeoma Connolly, PT  Sent: 5/1/2024   8:04 PM PDT  To: Celine Garcia R.N.; Bibi Barron, OT; *      PT vick completed, requesting auth for 1w4, 3 PRN visits effective 5/1/2024.

## 2024-05-02 NOTE — Clinical Note
I agree with these changes  ----- Message -----  From: Dory Mcnair R.N.  Sent: 5/2/2024   1:02 PM PDT  To: Marcelle Guardado R.N.      Quality Review Completed for 4/26 SOC OASIS by OPAL Mcnair, RN on 5/2/2024:     Edits completed by OPAL Mcnair, RN:     1.  and  diagnosis coding updated per chart review  2.  CG reports cog decline lately, changed  add 5  3.  is 1 for memory deficit and  several times a week per reports  4. Per narrative min level of assist functional status changed  and 1810 to 2,  to 2,  to 3 for lack of safety grab bars in shower area  5. D, E, F, I, J changed to #3 per narrative min level of asssit  6.  changed to 3 per guidelines when ambulation is with assistance  7. Checked exercises prescribed to Activities Permitted on 485 form.  Checked ambulate only w/assist, bleeding precautions to 485 Safety Measures

## 2024-05-03 ASSESSMENT — ACTIVITIES OF DAILY LIVING (ADL)
PHYSICAL TRANSFERS ASSESSED: 1
GROOMING ASSESSED: 1
BATHING_CURRENT_FUNCTION: MINIMUM ASSIST
LIGHT HOUSEKEEPING: NEEDS ASSISTANCE
GROOMING_CURRENT_FUNCTION: SUPERVISION
TOILETING: 1
CURRENT_FUNCTION: STAND BY ASSIST
FEEDING ASSESSED: 1
TRANSPORTATION: NEEDS ASSISTANCE
DRESSING_LB_CURRENT_FUNCTION: SUPERVISION
TRANSPORTATION ASSESSED: 1
USING THE TELPHONE: NEEDS ASSISTANCE
TOILETING: SUPERVISION
FEEDING: INDEPENDENT
PREPARING MEALS: NEEDS ASSISTANCE
LAUNDRY: NEEDS ASSISTANCE
ORAL_CARE_ASSESSED: 1
HOUSEKEEPING ASSESSED: 1
ORAL_CARE_CURRENT_FUNCTION: INDEPENDENT
DRESSING_UB_CURRENT_FUNCTION: INDEPENDENT
SHOPPING ASSESSED: 1
TELEPHONE USE ASSESSED: 1
LAUNDRY ASSESSED: 1
AMBULATION ASSISTANCE: 1
AMBULATION ASSISTANCE: STAND BY ASSIST
BATHING ASSESSED: 1
SHOPPING: NEEDS ASSISTANCE

## 2024-05-03 ASSESSMENT — ENCOUNTER SYMPTOMS
DENIES PAIN: 1
PERSON REPORTING PAIN: PATIENT

## 2024-05-06 NOTE — CASE COMMUNICATION
noted  ----- Message -----  From: Ami Ca OT  Sent: 5/3/2024   9:25 AM PDT  To: Celine Garcia R.N.; Opal Silverio R.N.; *      OT vick completed, requesting auth for  1w3 effective 5/2/24.

## 2024-05-07 ENCOUNTER — HOME CARE VISIT (OUTPATIENT)
Dept: HOME HEALTH SERVICES | Facility: HOME HEALTHCARE | Age: 79
End: 2024-05-07
Payer: MEDICARE

## 2024-05-07 VITALS
HEART RATE: 98 BPM | RESPIRATION RATE: 16 BRPM | DIASTOLIC BLOOD PRESSURE: 50 MMHG | SYSTOLIC BLOOD PRESSURE: 110 MMHG | TEMPERATURE: 97.5 F | OXYGEN SATURATION: 94 %

## 2024-05-07 VITALS
SYSTOLIC BLOOD PRESSURE: 110 MMHG | OXYGEN SATURATION: 94 % | RESPIRATION RATE: 16 BRPM | HEART RATE: 72 BPM | DIASTOLIC BLOOD PRESSURE: 50 MMHG | TEMPERATURE: 97.5 F

## 2024-05-07 VITALS
OXYGEN SATURATION: 94 % | HEART RATE: 72 BPM | SYSTOLIC BLOOD PRESSURE: 110 MMHG | RESPIRATION RATE: 16 BRPM | DIASTOLIC BLOOD PRESSURE: 50 MMHG | TEMPERATURE: 97.5 F

## 2024-05-07 ASSESSMENT — ENCOUNTER SYMPTOMS
DESCRIPTION OF MEMORY LOSS: SHORT TERM
MUSCLE WEAKNESS: 1
STOOL FREQUENCY: DAILY
PAIN SEVERITY GOAL: 0/10
BOWEL PATTERN NORMAL: 1
HIGHEST PAIN SEVERITY IN PAST 24 HOURS: 0/10
LAST BOWEL MOVEMENT: 66967
LOWEST PAIN SEVERITY IN PAST 24 HOURS: 0/10
DENIES PAIN: 1
DENIES PAIN: 1
PERSON REPORTING PAIN: PATIENT
FORGETFULNESS: 1

## 2024-05-07 NOTE — Clinical Note
FYI: Pt noted to be forgetful throughout session, poor recall, decreased safety awareness.  Wife reports that cognition has decreased since recent hospitalization, she was noticing several changes in the several months.  Education to pt and wife on role of SLP and option have to SLP evaluation.  Both state their understanding but would like to discuss this prior to making a decision.

## 2024-05-08 ENCOUNTER — HOME CARE VISIT (OUTPATIENT)
Dept: HOME HEALTH SERVICES | Facility: HOME HEALTHCARE | Age: 79
End: 2024-05-08
Payer: MEDICARE

## 2024-05-08 NOTE — CASE COMMUNICATION
noted  ----- Message -----  From: Ifeoma Connolly, PT  Sent: 5/7/2024   2:01 PM PDT  To: Celine Garcia R.N.; Ami Ca, OT      FYI: Pt noted to be forgetful throughout session, poor recall, decreased safety awareness.  Wife reports that cognition has decreased since recent hospitalization, she was noticing several changes in the several months.  Education to pt and wife on role of SLP and option have to SLP evaluation.  Both stat e their understanding but would like to discuss this prior to making a decision.

## 2024-05-09 ENCOUNTER — HOSPITAL ENCOUNTER (OUTPATIENT)
Dept: HEMATOLOGY ONCOLOGY | Facility: MEDICAL CENTER | Age: 79
End: 2024-05-09
Attending: STUDENT IN AN ORGANIZED HEALTH CARE EDUCATION/TRAINING PROGRAM
Payer: MEDICARE

## 2024-05-09 VITALS
SYSTOLIC BLOOD PRESSURE: 94 MMHG | HEIGHT: 68 IN | RESPIRATION RATE: 16 BRPM | WEIGHT: 189.2 LBS | DIASTOLIC BLOOD PRESSURE: 48 MMHG | TEMPERATURE: 98.3 F | HEART RATE: 93 BPM | BODY MASS INDEX: 28.67 KG/M2 | OXYGEN SATURATION: 95 %

## 2024-05-09 DIAGNOSIS — C91.10 CHRONIC LYMPHOCYTIC LEUKEMIA (HCC): ICD-10-CM

## 2024-05-09 PROCEDURE — 99204 OFFICE O/P NEW MOD 45 MIN: CPT | Performed by: STUDENT IN AN ORGANIZED HEALTH CARE EDUCATION/TRAINING PROGRAM

## 2024-05-09 ASSESSMENT — ENCOUNTER SYMPTOMS
ORTHOPNEA: 0
DIZZINESS: 0
ABDOMINAL PAIN: 0
SHORTNESS OF BREATH: 0
COUGH: 0
TINGLING: 0
BACK PAIN: 0
DIARRHEA: 0
SINUS PAIN: 0
NAUSEA: 0
WEAKNESS: 1
NERVOUS/ANXIOUS: 0
BLURRED VISION: 0
FEVER: 0
INSOMNIA: 0
BRUISES/BLEEDS EASILY: 0
PALPITATIONS: 0
HEARTBURN: 0
CONSTIPATION: 0
WHEEZING: 0
WEIGHT LOSS: 0
DIAPHORESIS: 0
SPUTUM PRODUCTION: 0
SORE THROAT: 0
VOMITING: 0
CHILLS: 0
BLOOD IN STOOL: 0
MYALGIAS: 0
DOUBLE VISION: 0
HEADACHES: 0

## 2024-05-09 ASSESSMENT — FIBROSIS 4 INDEX: FIB4 SCORE: 1.67

## 2024-05-09 ASSESSMENT — PAIN SCALES - GENERAL: PAINLEVEL: NO PAIN

## 2024-05-09 NOTE — ADDENDUM NOTE
Encounter addended by: Melvin Mccann D.O. on: 5/9/2024 3:37 PM   Actions taken: Problem List modified, SmartForm saved

## 2024-05-09 NOTE — PROGRESS NOTES
Consult:  Hematology/Oncology      Referring Physician: PCP  Primary Care:  Leticia Wilkerson M.D.    Diagnosis: CLL    Chief Complaint:  Evaluation for systemic therapy    History of Presenting Illness:  Hardy Roca is a 78 y.o.  man who presents to the clinic for evaluation for systemic therapy for a diagnosis of CLL. He was noted to have lab abnormalities when he went to the hospital for symptoms of vertigo, with a lymphocytosis, and subsequent work up with a flow cytometry revealed CLL. He was subsequently referred for evaluation. Of note, he also has alpha thalassemia trait.     Interval History:  Patient is here for consultation. He feels fine and has no complaints at this time. His wife and son are with him.     Past Medical History:   Diagnosis Date    Atrial flutter (HCC)     BPH (benign prostatic hypertrophy)     CATARACT 2006, 2007    both done, one at a time    HLD (hyperlipidemia)     Unspecified urinary incontinence     prostate enlargement , per patient       Past Surgical History:   Procedure Laterality Date    RECOVERY  11/23/2015    Procedure: CATH LAB-CARDIOVERSION-KOZTOWSKI-ANESTHESIA-ICD 10: I48.91;  [facility];  Location: SURGERY PRE-POST PROC UNIT [facility] [place];  Service:     INGUINAL HERNIA REPAIR  10/28/2009    Performed by ANGELI CHOI at SURGERY [facility]    HERNIA REPAIR  2009    OTHER  1982    tonsillectomy    APPENDECTOMY      CATARACT EXTRACTION WITH IOL      bilateral       Social History     Socioeconomic History    Marital status:      Spouse name: Not on file    Number of children: Not on file    Years of education: Not on file    Highest education level: Not on file   Occupational History    Not on file   Tobacco Use    Smoking status: Never    Smokeless tobacco: Never   Substance and Sexual Activity    Alcohol use: Yes     Comment: rarely    Drug use: No    Sexual activity: Not on file   Other Topics Concern    Not on file   Social History  Narrative    Not on file     Social Determinants of Health     Financial Resource Strain: Not on file   Food Insecurity: Not on file   Transportation Needs: Not on file   Physical Activity: Not on file   Stress: Not on file   Social Connections: Feeling Socially Integrated (4/26/2024)    OASIS : Social Isolation     Frequency of experiencing loneliness or isolation: Rarely   Intimate Partner Violence: Not on file   Housing Stability: Not on file       Family History   Problem Relation Age of Onset    Heart Disease Neg Hx     Heart Failure Neg Hx        OB History   No obstetric history on file.       Allergies as of 05/09/2024    (No Known Allergies)         Current Outpatient Medications:     amiodarone (CORDARONE) 200 MG Tab, Take 1 Tablet by mouth every day., Disp: 90 Tablet, Rfl: 0    escitalopram (LEXAPRO) 10 MG Tab, Take 10 mg by mouth every day. Indications: Generalized Anxiety Disorder, Disp: , Rfl:     apixaban (ELIQUIS) 5mg Tab, Take 1 Tablet by mouth 2 times a day., Disp: 200 Tablet, Rfl: 3    terazosin (HYTRIN) 10 MG capsule, Take 10 mg by mouth every evening. Indications: Benign Enlargement of Prostate, Disp: , Rfl:     simvastatin (ZOCOR) 20 MG Tab, Take 1 Tab by mouth every evening., Disp: 30 Tab, Rfl: 11    Cholecalciferol 1000 UNIT Cap, Take 1 Capsule by mouth every day. Indications: Nutritional support (Patient not taking: Reported on 5/9/2024), Disp: , Rfl:     Cyanocobalamin (B-12) 1000 MCG Cap, Take 1 Capsule by mouth every day. Indications: Nutritional support (Patient not taking: Reported on 5/9/2024), Disp: , Rfl:     ascorbic acid (VITAMIN C) 1000 MG tablet, Take 1,000 mg by mouth every day. Indications: Nutritional supplement (Patient not taking: Reported on 5/9/2024), Disp: , Rfl:     metoprolol SR (TOPROL XL) 25 MG TABLET SR 24 HR, Take 1 Tablet by mouth every day. (Patient not taking: Reported on 5/7/2024), Disp: 90 Tablet, Rfl: 3    Multiple Vitamins-Minerals (OCUVITE PO), Take 1  "Capsule by mouth every day. Indications: Nutritional supplement (Patient not taking: Reported on 5/9/2024), Disp: , Rfl:     Review of Systems:  Review of Systems   Constitutional:  Negative for chills, diaphoresis, fever, malaise/fatigue and weight loss.   HENT:  Negative for hearing loss, nosebleeds, sinus pain and sore throat.    Eyes:  Negative for blurred vision and double vision.   Respiratory:  Negative for cough, sputum production, shortness of breath and wheezing.    Cardiovascular:  Negative for chest pain, palpitations, orthopnea and leg swelling.   Gastrointestinal:  Negative for abdominal pain, blood in stool, constipation, diarrhea, heartburn, melena, nausea and vomiting.   Genitourinary:  Negative for dysuria, frequency, hematuria and urgency.   Musculoskeletal:  Negative for back pain, joint pain and myalgias.   Skin:  Negative for rash.   Neurological:  Positive for weakness. Negative for dizziness, tingling and headaches.   Endo/Heme/Allergies:  Does not bruise/bleed easily.   Psychiatric/Behavioral:  The patient is not nervous/anxious and does not have insomnia.           Physical Exam:  Vitals:    05/09/24 1116   BP: 94/48   Pulse: 93   Resp: 16   Temp: 36.8 °C (98.3 °F)   TempSrc: Temporal   SpO2: 95%   Weight: 85.8 kg (189 lb 3.2 oz)   Height: 1.727 m (5' 7.99\")       DESC; KARNOFSKY SCALE WITH ECOG EQUIVALENT: 70, Cares for self; unable to carry on normal activity or to do active work (ECOG equivalent 1)    DISTRESS LEVEL: no apparent distress    Physical Exam  Vitals and nursing note reviewed.   Constitutional:       General: He is awake. He is not in acute distress.     Appearance: Normal appearance. He is normal weight. He is not ill-appearing, toxic-appearing or diaphoretic.   HENT:      Head: Normocephalic and atraumatic.      Nose: Nose normal. No congestion.      Mouth/Throat:      Pharynx: Oropharynx is clear. No oropharyngeal exudate or posterior oropharyngeal erythema.   Eyes:      " General: No scleral icterus.     Extraocular Movements: Extraocular movements intact.      Conjunctiva/sclera: Conjunctivae normal.      Pupils: Pupils are equal, round, and reactive to light.   Cardiovascular:      Rate and Rhythm: Normal rate and regular rhythm.      Pulses: Normal pulses.      Heart sounds: Normal heart sounds. No murmur heard.     No friction rub. No gallop.   Pulmonary:      Effort: Pulmonary effort is normal.      Breath sounds: Normal breath sounds. No decreased air movement. No wheezing, rhonchi or rales.   Abdominal:      General: Bowel sounds are normal. There is no distension.      Tenderness: There is no abdominal tenderness.   Musculoskeletal:         General: No deformity. Normal range of motion.      Cervical back: Normal range of motion and neck supple. No tenderness.      Right lower leg: No edema.      Left lower leg: No edema.   Lymphadenopathy:      Cervical: No cervical adenopathy.      Upper Body:      Right upper body: No axillary adenopathy.      Left upper body: No axillary adenopathy.      Lower Body: No right inguinal adenopathy. No left inguinal adenopathy.   Skin:     General: Skin is warm and dry.      Coloration: Skin is not jaundiced.      Findings: No erythema or rash.   Neurological:      General: No focal deficit present.      Mental Status: He is alert and oriented to person, place, and time.      Sensory: Sensation is intact.      Motor: Weakness present.      Gait: Gait abnormal (Using a wheelchair).   Psychiatric:         Attention and Perception: Attention normal.         Mood and Affect: Mood normal.         Behavior: Behavior normal. Behavior is cooperative.         Thought Content: Thought content normal.         Judgment: Judgment normal.          Depression Screening    Little interest or pleasure in doing things?      Feeling down, depressed , or hopeless?     Trouble falling or staying asleep, or sleeping too much?      Feeling tired or having little  energy?      Poor appetite or overeating?      Feeling bad about yourself - or that you are a failure or have let yourself or your family down?     Trouble concentrating on things, such as reading the newspaper or watching television?     Moving or speaking so slowly that other people could have noticed.  Or the opposite - being so fidgety or restless that you have been moving around a lot more than usual?      Thoughts that you would be better off dead, or of hurting yourself?      Patient Health Questionnaire Score:         If depressive symptoms identified deferred to follow up visit unless specifically addressed in assesment and plan.    Interpretation of PHQ-9 Total Score   Score Severity   1-4 No Depression   5-9 Mild Depression   10-14 Moderate Depression   15-19 Moderately Severe Depression   20-27 Severe Depression    Labs:  No visits with results within 7 Day(s) from this visit.   Latest known visit with results is:   No results displayed because visit has over 200 results.          Imaging:   All listed images below have been independently reviewed by me. I agree with the findings as summarized below:    CT-CHEST,ABDOMEN,PELVIS W/O    Result Date: 4/22/2024 4/22/2024 9:47 AM HISTORY/REASON FOR EXAM:  leukocytosis of unknown etiology TECHNIQUE/EXAM DESCRIPTION: CT scan of the chest, abdomen and pelvis without contrast was performed. Noncontrast helical scanning of the chest, abdomen and pelvis was obtained from the lung apices through the pubic symphysis. Low dose optimization technique was utilized for this CT exam including automated exposure control and adjustment of the mA and/or kV according to patient size. COMPARISON:  1 view chest 4/16/2024 FINDINGS: Osseous structures: There is facet arthropathy of the spine and there is bilateral sacroiliac joint osteoarthritis. Chest: LUNGS: There are mild bilateral dependent densities consistent with atelectasis. Lungs are otherwise clear. MEDIASTINUM: There  is no adenopathy, mass, or other abnormality within the axilla, the mediastinum, or the janneth. AORTA: There is aortic and coronary artery atherosclerotic plaque. PLEURA: There are small bilateral pleural effusion. Abdomen: There is a hiatal hernia which is moderate to large in size. LIVER: The liver is normal in appearance. SPLEEN: There is mild splenomegaly with craniocaudal length of 12.7 cm. PANCREAS: The pancreas is normal in appearance. GALLBLADDER and biliary system: No gallstones or biliary dilation identified Venous vasculature: The splenic vein and portal vein enhance normally. ADRENAL GLANDS: The adrenal glands are normal in appearance. KIDNEYS: There are tiny right renal nonobstructive calculi. There is a left renal cyst. No other finding. AORTA: The aorta is normal in appearance. BOWEL: There is moderate left colon diverticulosis. Pelvis: There is marked enlargement of the prostate. There is a 14 mm calculus dependent within the bladder.     1.  No adenopathy or other evidence for lymphoma within the chest, abdomen, or pelvis 2.  Mild splenomegaly, nonspecific 3.  Small bilateral pleural effusion 4.  14 mm bladder calculus and there are tiny nonobstructive right renal calculi 5.  Incidental right renal cyst 6.  Diffuse atherosclerotic plaque 7.  Marked enlargement of the prostate    EC-ECHOCARDIOGRAM COMPLETE W/O CONT    Result Date: 4/17/2024  Transthoracic Echo Report Echocardiography Laboratory CONCLUSIONS Compared to the images of the prior study 02/27/2019 - the estimate of right ventricular systolic pressure is increased, previously normal. Normal left ventricular systolic function. The ejection fraction is measured to be 72 % by Cuenca's biplane. Normal diastolic function. The right ventricle is normal in size and systolic function. Mild aortic insufficiency. Trace tricuspid regurgitation. Right ventricular systolic pressure is estimated to be 40 mmHg. NESSA ESCALANTE Exam Date:         04/17/2024                     08:03 Exam Location:     Inpatient Priority:          Routine Ordering Physician:        SUN MOSHER Referring Physician:       [id number] Sonographer:               Maddie Barton RDCS Age:    78     Gender:    M MRN:    [id number] :    1945 BSA:    2.07   Ht (in):    69     Wt (lb):    200 Exam Type:     Complete Indications:     CVA ICD Codes:       436 CPT Codes:       38769 BP:   142    /   69     HR:   67 Technical Quality:       Fair MEASUREMENTS  (Male / Female) Normal Values 2D ECHO LV Diastolic Diameter PLAX        4.9 cm                4.2 - 5.9 / 3.9 - 5.3 cm LV Systolic Diameter PLAX         2.4 cm                2.1 - 4.0 cm IVS Diastolic Thickness           1.3 cm                LVPW Diastolic Thickness          1.3 cm                LVOT Diameter                     2.3 cm                Estimated LV Ejection Fraction    72 %                  LV Ejection Fraction MOD BP       71.8 %                >= 55  % LV Ejection Fraction MOD 4C       68.3 %                LV Ejection Fraction MOD 2C       76.2 %                IVC Diameter                      1.5 cm                DOPPLER AV Peak Velocity                  1.8 m/s               AV Peak Gradient                  13.1 mmHg             AV Mean Gradient                  6.6 mmHg              LVOT Peak Velocity                0.92 m/s              AV Area Cont Eq vti               2.4 cm2               Mitral E Point Velocity           0.92 m/s              Mitral E to A Ratio               1                     MV Pressure Half Time             85 ms                 MV Area PHT                       2.6 cm2               MV Deceleration Time              293 ms                TR Peak Velocity                  290 cm/s              PV Peak Velocity                  1.1 m/s               PV Peak Gradient                  5.1 mmHg              RVOT  Peak Velocity                0.61 m/s              * Indicates values subject to auto-interpretation LV EF:  72    % FINDINGS Left Ventricle Normal left ventricular chamber size. Mild concentric left ventricular hypertrophy. Normal left ventricular systolic function. The ejection fraction is measured to be 72 % by Cuenca's biplane. Normal regional wall motion. Normal diastolic function. Right Ventricle The right ventricle is normal in size and systolic function. Right Atrium The right atrium is normal in size. Normal inferior vena cava size and inspiratory collapse. Left Atrium Mildly dilated left atrium. Left atrial volume index is 37 mL/sq m. Mitral Valve Structurally normal mitral valve without significant stenosis. Trace mitral regurgitation. Aortic Valve Structurally normal aortic valve without significant stenosis. Mild aortic insufficiency. Tricuspid Valve Structurally normal tricuspid valve without significant stenosis. Trace tricuspid regurgitation. Right ventricular systolic pressure is estimated to be 40 mmHg. Right atrial pressure is estimated to be 3 mmHg. Pulmonic Valve Structurally normal pulmonic valve without significant stenosis or regurgitation. Pericardium No pericardial effusion. Aorta Normal aortic root for body surface area. The ascending aorta diameter is 3.1 cm. Phu Harmon MD (Electronically Signed) Final Date:     17 April 2024                 13:55    MR-BRAIN-W/O    Result Date: 4/17/2024 4/17/2024 10:44 AM HISTORY/REASON FOR EXAM: DIZZINESS lightheadedness. TECHNIQUE/EXAM DESCRIPTION: T1 sagittal, T2 axial, flair coronal, T1 coronal, and diffusion-weighted axial images were obtained of the brain. COMPARISON: CT brain 4/16/2024. FINDINGS: Age-related volume loss noted with prominent sulci, cisterns and ventricles. Ventricular dilatation is proportionate to the degree of cerebral volume loss. Minimal hemosiderin staining noted in the right frontal cortical surface. Similar  abnormalities are noted in the right parietal region over the convexity as well as in the medial posterior right frontal region. Nonspecific T2 hyperintensities are noted in the periventricular and deep white matter, most likely related to chronic microvascular ischemia. There is no evidence of intracranial mass or mass effect. No evidence of midline shift. There is no evidence of focal cerebral edema. There are no extra axial collections. Visualized intracranial arterial flow voids are within normal limits. Bone marrow signal in the calvarium is within normal limits. Inflammatory mucosal thickening is noted in the right maxillary sinus. Included portions of the mastoid air cells are within normal limits. Included portions of the orbits are within normal limits     Age-related volume loss and chronic microvascular ischemic changes. Superficial siderosis noted in the right frontoparietal cortical surface. This could be related to remote intracranial traumatic injury or remote subarachnoid hemorrhage. Also, in the appropriate clinical setting, this can be seen with underlying amyloid  angiopathy.    CT-CTA HEAD WITH & W/O-POST PROCESS    Result Date: 4/16/2024 4/16/2024 8:33 PM HISTORY/REASON FOR EXAM:  Dizziness TECHNIQUE/EXAM DESCRIPTION: CT angiogram of the [place] without and with contrast.  Initial precontrast images were obtained of the head from the skull base through the vertex.  Postcontrast images were obtained of the [place] following the power injection of nonionic contrast at 5.0 mL/sec. Thin-section helical images were obtained with overlapping reconstruction interval. Coronal and sagittal multiplanar volume reformats were generated.  3D angiographic images were reviewed on PACS.  Maximum intensity projection (MIP) images were generated and reviewed. 91 mL of Omnipaque 350 nonionic contrast was injected intravenously. Low dose optimization technique was utilized for this CT exam  including automated exposure control and adjustment of the mA and/or kV according to patient size. COMPARISON:  None. FINDINGS: The posterior circulation shows the distal vertebral arteries to be patent. The vertebrobasilar confluence is intact. The basilar artery is patent. No aneurysm or occlusive lesion is evident. Atherosclerotic changes of the bilateral intracranial internal carotid arteries are seen with less than 50% stenosis, otherwise the anterior circulation shows no stenotic or occlusive lesion. No aneurysm is evident about the [place]. Mild diffuse parenchymal volume loss is seen. Scattered periventricular and subcortical white matter low-density changes are seen, commonly associated with small vessel ischemia, otherwise indeterminate. Mild bilateral ventricular dilatation, likely ex vacuo changes. 3D angiographic/MIP images of the vasculature confirm the vascular findings as described above.     1.  No large vessel occlusion or aneurysm identified.    DX-CHEST-PORTABLE (1 VIEW)    Result Date: 4/16/2024 4/16/2024 7:24 PM HISTORY/REASON FOR EXAM: Shortness of breath TECHNIQUE/EXAM DESCRIPTION AND NUMBER OF VIEWS: Single portable view of the chest. COMPARISON: None FINDINGS: There is no evidence of focal consolidation or evidence of pulmonary edema. There is no pleural effusion. The heart is normal in size.     No evidence of acute cardiopulmonary process.       Pathology:  NA    Assessment & Plan:  1. Chronic lymphocytic leukemia (HCC)  CBC WITH DIFFERENTIAL    Chromosome FISH, CLL Panel    IGHV MUTATION ANALYSIS BY SEQUENCING          This is a 78 year old  man with Carrillo stage 0 CLL, who presents for evaluation.     Current Diagnosis and Staging: CLL, Carrillo stage 0    Treatment Plan: Surveillance    Treatment Citation: NCCN    Plan of Care:    Primary Therapy: NA  Supportive Therapy: NA  Toxicity: Patient is getting antineoplastic therapy and needs monitoring of blood counts, hepatic  function, and renal function due to potential for organ dysfunction.   Labs: CBC with diff monitoring; check CLL FISH panel and IgHV mutational status  Imaging: NA  Treatment Planning: Patient has Carrillo stage 0 CLL so can be monitored without treatment for now.   Consultations: NA  Code Status: Full  Miscellaneous: NA  Return for Follow Up: 6 months    Any questions and concerns raised by the patient were answered to the best of my ability. Thank you for allowing me to participate in the care for this patient. Please feel free to contact me for any questions or concerns.     Total time spent on chart review, clinic encounter, and documentation: 33 minutes.

## 2024-05-10 NOTE — PROGRESS NOTES
"Chief Complaint   Patient presents with    Palpitations     Follow up          Subjective:   Hardy Roca is a 78 y.o. male who presents today for follow-up.     Patient of Dr. Raymond.  \"The patient has PAF (flutter), OAC on apixaban, DCCV 11/23/2015, dyslipidemia, CKD, elevated PSA, microcytic anemia due to thalassemia. \"    Last visit 3/14/2024, he reported palpitations and a Zio patch monitor was placed.  He was then hospitalized 4/17 - 4/24 for dizziness.  At that time he underwent Epley maneuvers which improved his symptoms.  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  Since his discharge he established with Dr. Mccann, hematology/oncology who is performing further testing and surveillance.  He was in and out of atrial flutter during the admission and was started on amiodarone.    Hardy has been feeling better since his admission.  He is slightly more weak and deconditioned, he was short of breath during his normal walk.  He continues to work with physical therapy for his ongoing dizziness.  The Epley maneuvers have been helpful.  His primary care also stopped the metoprolol which has been helpful as well.  His blood pressures continue to run low and he has orthostatic symptoms.  He still take terazosin at night for BPH.  He is no longer bothered by the palpitations.  He is not interested in procedures that require hospitalization at this point.    Past Medical History:   Diagnosis Date    Atrial flutter (HCC)     BPH (benign prostatic hypertrophy)     CATARACT 2006, 2007    both done, one at a time    HLD (hyperlipidemia)     Unspecified urinary incontinence     prostate enlargement , per patient         Family History   Problem Relation Age of Onset    Heart Disease Neg Hx     Heart Failure Neg Hx          Social History     Tobacco Use    Smoking status: Never    Smokeless tobacco: Never   Substance Use Topics    Alcohol use: Yes     Comment: rarely    Drug use: No         No " "Known Allergies      Current Outpatient Medications   Medication Sig    amiodarone (CORDARONE) 200 MG Tab Take 1 Tablet by mouth every day.    escitalopram (LEXAPRO) 10 MG Tab Take 10 mg by mouth every day. Indications: Generalized Anxiety Disorder    apixaban (ELIQUIS) 5mg Tab Take 1 Tablet by mouth 2 times a day.    Multiple Vitamins-Minerals (OCUVITE PO) Take 1 Capsule by mouth every day. Indications: Nutritional supplement    terazosin (HYTRIN) 10 MG capsule Take 10 mg by mouth every evening. Indications: Benign Enlargement of Prostate    simvastatin (ZOCOR) 20 MG Tab Take 1 Tab by mouth every evening.         Review of Systems   Respiratory:  Positive for shortness of breath. Negative for cough.    Cardiovascular:  Negative for chest pain, palpitations, leg swelling and PND.   Musculoskeletal:  Negative for falls.   Neurological:  Positive for dizziness.          Objective:   /52 (BP Location: Left arm, Patient Position: Sitting)   Pulse 80   Resp 16   Ht 1.727 m (5' 8\")   Wt 82.6 kg (182 lb)   SpO2 96%  Body mass index is 27.67 kg/m².         Physical Exam  Vitals reviewed.   Constitutional:       Comments: Ambulating with wheeled walker   HENT:      Head: Normocephalic and atraumatic.   Cardiovascular:      Rate and Rhythm: Normal rate and regular rhythm.      Heart sounds: Murmur heard.   Pulmonary:      Effort: Pulmonary effort is normal. No respiratory distress.      Breath sounds: No rales.   Musculoskeletal:      Right lower leg: No edema.      Left lower leg: No edema.   Skin:     General: Skin is warm and dry.   Neurological:      Mental Status: He is alert.            Assessment:     1. Long term current use of amiodarone  Comp Metabolic Panel    TSH+FREE T4      2. Secondary hypercoagulable state (HCC)        3. Paroxysmal atrial fibrillation (HCC)        4. Palpitations        5. Orthostatic lightheadedness             Medical Decision Making:  Today's Assessment / Plan:   Hospital follow " up    Dizziness  -Multifactorial with improvement in his symptoms from Epley maneuvers, hypotensive, possibly from high-dose terazosin.  We discussed reducing this although he has had problems with urinary retention requiring Walden.  We agreed to let him discuss dose reduction or med change with his urologist    PAF on chronic anticoagulation  -Recently started on amiodarone in the hospital.  Continue to take 200 mg, he is in regular rate and rhythm today.  We discussed other antiarrhythmics including flecainide which requires stress test versus catheter ablation.    The risk and benefits of use of amiodarone have been explained in detail.  Risks of pulmonary toxicity, ocular toxicity, liver toxicity, neurologic toxicity, skin toxicity, thyroid toxicity have been explained in detail.  Irreversible pulmonary toxicity possibly leading to severe morbidity or death has also been explained.  The patient understands that approximately 20% of patients have to stop medications secondary to side effects.  The patient wishes to proceed with dosing of amiodarone.    TFTs and CXR done in hospital and regular 6-month and annual surveillance with LFTs, TFTs, CXR and EKG.    Labs due in August, call patient/wife with results, they do not check mychart  Return in about 6 months (around 11/13/2024) for Dr Raymond.  Sooner if problems.

## 2024-05-12 ASSESSMENT — ENCOUNTER SYMPTOMS
PERSON REPORTING PAIN: PATIENT
DENIES PAIN: 1

## 2024-05-12 ASSESSMENT — ACTIVITIES OF DAILY LIVING (ADL): TRANSPORTATION COMMENTS: DIZZINESS

## 2024-05-13 ENCOUNTER — OFFICE VISIT (OUTPATIENT)
Dept: CARDIOLOGY | Facility: MEDICAL CENTER | Age: 79
End: 2024-05-13
Attending: PHYSICIAN ASSISTANT
Payer: MEDICARE

## 2024-05-13 VITALS
SYSTOLIC BLOOD PRESSURE: 104 MMHG | WEIGHT: 182 LBS | OXYGEN SATURATION: 96 % | HEART RATE: 80 BPM | RESPIRATION RATE: 16 BRPM | BODY MASS INDEX: 27.58 KG/M2 | HEIGHT: 68 IN | DIASTOLIC BLOOD PRESSURE: 52 MMHG

## 2024-05-13 DIAGNOSIS — Z79.899 LONG TERM CURRENT USE OF AMIODARONE: ICD-10-CM

## 2024-05-13 DIAGNOSIS — I48.0 PAROXYSMAL ATRIAL FIBRILLATION (HCC): ICD-10-CM

## 2024-05-13 DIAGNOSIS — R42 ORTHOSTATIC LIGHTHEADEDNESS: ICD-10-CM

## 2024-05-13 DIAGNOSIS — R00.2 PALPITATIONS: ICD-10-CM

## 2024-05-13 DIAGNOSIS — D68.69 SECONDARY HYPERCOAGULABLE STATE (HCC): ICD-10-CM

## 2024-05-13 PROCEDURE — 3074F SYST BP LT 130 MM HG: CPT | Performed by: PHYSICIAN ASSISTANT

## 2024-05-13 PROCEDURE — 99214 OFFICE O/P EST MOD 30 MIN: CPT | Performed by: PHYSICIAN ASSISTANT

## 2024-05-13 PROCEDURE — 3078F DIAST BP <80 MM HG: CPT | Performed by: PHYSICIAN ASSISTANT

## 2024-05-13 ASSESSMENT — ENCOUNTER SYMPTOMS
FALLS: 0
SHORTNESS OF BREATH: 1
COUGH: 0
PND: 0
PALPITATIONS: 0
DIZZINESS: 1

## 2024-05-13 ASSESSMENT — FIBROSIS 4 INDEX: FIB4 SCORE: 1.67

## 2024-05-13 NOTE — PATIENT INSTRUCTIONS
Please ask Dr. Gaemz if you can reduce the Terazosin dose or change to a different medication for low blood pressure and orthostatic dizziness.     Nonfasting labs drawn in 3 mo (8/13/24)

## 2024-05-14 ENCOUNTER — HOME CARE VISIT (OUTPATIENT)
Dept: HOME HEALTH SERVICES | Facility: HOME HEALTHCARE | Age: 79
End: 2024-05-14
Payer: MEDICARE

## 2024-05-14 VITALS
TEMPERATURE: 97.7 F | HEART RATE: 71 BPM | RESPIRATION RATE: 16 BRPM | DIASTOLIC BLOOD PRESSURE: 52 MMHG | SYSTOLIC BLOOD PRESSURE: 110 MMHG | OXYGEN SATURATION: 97 %

## 2024-05-14 VITALS
TEMPERATURE: 97.7 F | RESPIRATION RATE: 16 BRPM | HEART RATE: 71 BPM | SYSTOLIC BLOOD PRESSURE: 110 MMHG | OXYGEN SATURATION: 97 % | DIASTOLIC BLOOD PRESSURE: 52 MMHG

## 2024-05-14 ASSESSMENT — ENCOUNTER SYMPTOMS
POOR JUDGMENT: 1
PERSON REPORTING PAIN: PATIENT
LOWEST PAIN SEVERITY IN PAST 24 HOURS: 0/10
DIFFICULTY THINKING: 1
PAIN SEVERITY GOAL: 0/10
DENIES PAIN: 1
HIGHEST PAIN SEVERITY IN PAST 24 HOURS: 0/10

## 2024-05-15 ASSESSMENT — ACTIVITIES OF DAILY LIVING (ADL): TRANSPORTATION COMMENTS: DIZZINESS

## 2024-05-20 ENCOUNTER — TELEPHONE (OUTPATIENT)
Dept: CARDIOLOGY | Facility: MEDICAL CENTER | Age: 79
End: 2024-05-20
Payer: MEDICARE

## 2024-05-20 ASSESSMENT — ENCOUNTER SYMPTOMS
PERSON REPORTING PAIN: PATIENT
DENIES PAIN: 1

## 2024-05-20 NOTE — TELEPHONE ENCOUNTER
Last OV: 5/13/24  Proposed Surgery: Cystolithalopaxy   Surgery Date: 6/18/24  Requesting Office Name: [facility]  Fax Number: [phone]  Preference of Location (default is surgery center unless specified by Cardiologist or JADA)  Prior Clearance Addressed: No      Anticoags/Antiplatelets: Apixaban   Anticoags/Antiplatelet managed by Cardiology? YES    Outstanding Cardiac Imaging : No  Stent, Cardiac Devices, or Catheterization: No  Ablation, TAVR/Valve (including open heart), Cardioversion: No  Recent Cardiac Hospitalization: No            When: N/A  History (cardiac history):   Past Medical History:   Diagnosis Date    Atrial flutter (HCC)     BPH (benign prostatic hypertrophy)     CATARACT 2006, 2007    both done, one at a time    HLD (hyperlipidemia)     Unspecified urinary incontinence     prostate enlargement , per patient             Surgical Clearance Letter Sent: YES   **Scan clearance request letter into [facility].**

## 2024-05-20 NOTE — LETTER
PROCEDURE/SURGERY CLEARANCE FORM    Date: 5/20/2024   Patient Name: Hardy Roca    Dear Surgeon or Proceduralist,      Thank you for your request for cardiac stratification of our mutual patient Hardy Roca 1945. We have reviewed their [facility] records; and to the best of our understanding this patient has not had stenting, ablation, cardiothoracic surgery or hospitalization for cardiovascular reasons in the past 6 months.  Hardy Roca has been seen within the past 18 months and is considered to have non-modifiable cardiac risk for this low-risk procedure/surgery. They may proceed from a cardiovascular standpoint and may hold their antiplatelet/anticoagulation as briefly as possible. Please have patient resume this medication when hemodynamically stable to do so.     Aspirin or Prasugrel   - hold 7 days prior to procedure/surgery, resume when hemodynamically stable      Clopidrogrel or Ticagrelor  - hold 7 days for all neurological procedures, hold 5 days prior to all other procedure/surgery,  resume when hemodynamically stable     Warfarin - hold 7 days for all neurological procedures, hold 5 days prior to all other procedure/surgery and coordinate with [facility] ([phone]) INR testing and dose management.      Pradaxa/Xarelto/Eliquis/Savesya - hold 1 day prior to procedure for low bleeding risk procedure, 2 days for high bleeding risk procedure, or consider holding 3 days or longer for patients with reduced kidney function (CrCl <30mL/min) or spinal/cranial surgeries/procedures.      If they have a mechanical heart valve, please coordinate with [facility] ([phone]) the proper management of their anticoagulant in the periprocedural or perioperative period.      Some patients have higher risk for cardiovascular complications or holding medication. If our patient has had prior complications of holding antiplatelet or anticoagulants in the past and we  have seen them after these events, we have addressed these concerns with the patient. They are at an unknown degree of increased risk for recurrent complication.  You may hold anticoagulation/antiplatelets for the procedure or surgery if the benefits of the procedure or surgery outweigh this nonmodifiable risk.      If Hardy Roca 1945 has new symptoms of heart failure decompensation, unstable arrythmia, or angina please reach out and we will assess the patient.      If you have other patient-specific concerns, please feel free to reach out to the patient's cardiologist directly at [phone].     Thank you,       [facility]

## 2024-05-21 NOTE — CASE COMMUNICATION
noted  ----- Message -----  From: Ami Ca, OT  Sent: 5/20/2024   5:56 AM PDT  To: Celine Garcia R.N.; Opal Silverio R.N. DC OT services, effective 5/14/24. Goals met.

## 2024-05-22 ENCOUNTER — HOME CARE VISIT (OUTPATIENT)
Dept: HOME HEALTH SERVICES | Facility: HOME HEALTHCARE | Age: 79
End: 2024-05-22
Payer: MEDICARE

## 2024-05-22 VITALS
SYSTOLIC BLOOD PRESSURE: 114 MMHG | RESPIRATION RATE: 16 BRPM | HEART RATE: 71 BPM | TEMPERATURE: 97.9 F | OXYGEN SATURATION: 96 % | DIASTOLIC BLOOD PRESSURE: 72 MMHG

## 2024-05-22 ASSESSMENT — ENCOUNTER SYMPTOMS
POOR JUDGMENT: 1
LOWEST PAIN SEVERITY IN PAST 24 HOURS: 0/10
HIGHEST PAIN SEVERITY IN PAST 24 HOURS: 0/10
DENIES PAIN: 1
PAIN SEVERITY GOAL: 0/10
PERSON REPORTING PAIN: PATIENT

## 2024-05-22 ASSESSMENT — ACTIVITIES OF DAILY LIVING (ADL)
CURRENT_FUNCTION: SUPERVISION
BATHING_COMMENTS: SEE OT NOTES
FEEDING_COMMENTS: SEE OT NOTES
PHYSICAL_TRANSFERS_DEVICES: USE OF B UES TO ASSIST
AMBULATION ASSISTANCE: 1
PHYSICAL TRANSFERS ASSESSED: 1
AMBULATION ASSISTANCE: STAND BY ASSIST
AMBULATION ASSISTANCE ON UNEVEN SURFACES: 1
GROOMING_COMMENTS: SEE OT NOTES

## 2024-05-25 NOTE — CASE COMMUNICATION
noted  ----- Message -----  From: Ifeoma Connolly, PT  Sent: 5/22/2024   8:01 PM PDT  To: Celine Garcia R.N.; Ran Reyes      PT joeyal completed, requesting auth for 1w3 effective 5/26/2024.

## 2024-05-28 ENCOUNTER — HOME CARE VISIT (OUTPATIENT)
Dept: HOME HEALTH SERVICES | Facility: HOME HEALTHCARE | Age: 79
End: 2024-05-28
Payer: MEDICARE

## 2024-05-28 VITALS
SYSTOLIC BLOOD PRESSURE: 104 MMHG | DIASTOLIC BLOOD PRESSURE: 58 MMHG | TEMPERATURE: 98.1 F | OXYGEN SATURATION: 97 % | HEART RATE: 79 BPM | RESPIRATION RATE: 16 BRPM

## 2024-05-28 ASSESSMENT — ENCOUNTER SYMPTOMS
DENIES PAIN: 1
HIGHEST PAIN SEVERITY IN PAST 24 HOURS: 0/10
POOR JUDGMENT: 1
LOWEST PAIN SEVERITY IN PAST 24 HOURS: 0/10
PAIN SEVERITY GOAL: 0/10
PERSON REPORTING PAIN: PATIENT

## 2024-05-28 NOTE — TELEPHONE ENCOUNTER
QUENTIN    Caller: Joanna (Spouse)    Topic/issue: Patient has an upcoming surgery and she has questions about him stopping his Eliquis as well as another medication she is concerned about, please call     Callback Number: [phone]    Thank You   Robyn FARIAS

## 2024-05-28 NOTE — TELEPHONE ENCOUNTER
Phone Number Called: [phone]     Call outcome: Did not leave a detailed message. Requested patient to call back.    Message: Called to discuss questions re Eliquis. GINETTE

## 2024-05-28 NOTE — TELEPHONE ENCOUNTER
Phone Number Called: [phone]    Call outcome:  spoke to wife    Message: Called to discuss, surgical clearance sent over and is in file. I reviewed our guidelines for eliquis. Pt will discuss with surgical RN. She also questioned amiodarone, advised her to ask RN, however patients typically take their regular meds the morning of sx except for certain BP meds. Pt will follow-up with us with further questions. Appreciative of call.

## 2024-05-28 NOTE — TELEPHONE ENCOUNTER
JA: Pt scheduled for Cystolithalopaxy mid-June. Surgical center info says to hold blood thinners 7-10 days. Pt to discuss our clearance with center which states:    Pradaxa/Xarelto/Eliquis/Savesya - hold 1 day prior to procedure for low bleeding risk procedure, 2 days for high bleeding risk procedure, or consider holding 3 days or longer for patients with reduced kidney function (CrCl <30mL/min) or spinal/cranial surgeries/procedures.       What is your advise on holding Eliquis prior to this sx?

## 2024-05-28 NOTE — TELEPHONE ENCOUNTER
Caller: Joanna    Topic/issue: Returning Nisreen's call - could not reach her on the phone. Please callback at earliest convenience.     Callback Number: [phone]    Thank you,  Nat LOZA

## 2024-05-30 NOTE — TELEPHONE ENCOUNTER
Phone Number Called: [phone]     Call outcome:  Spoke with wife Cammie    Message: Called to share Catia recommendation of holding Eliquis 2 days prior to surgery. She will have surgery center contact us if they need to discuss.

## 2024-06-03 ENCOUNTER — HOSPITAL ENCOUNTER (OUTPATIENT)
Dept: LAB | Facility: MEDICAL CENTER | Age: 79
End: 2024-06-03
Attending: STUDENT IN AN ORGANIZED HEALTH CARE EDUCATION/TRAINING PROGRAM
Payer: MEDICARE

## 2024-06-03 LAB
ANION GAP SERPL CALC-SCNC: 13 MMOL/L (ref 7–16)
ANISOCYTOSIS BLD QL SMEAR: ABNORMAL
APPEARANCE UR: CLEAR
BASOPHILS # BLD AUTO: 0 % (ref 0–1.8)
BASOPHILS # BLD: 0 K/UL (ref 0–0.12)
BILIRUB UR QL STRIP.AUTO: NEGATIVE
BUN SERPL-MCNC: 37 MG/DL (ref 8–22)
CALCIUM SERPL-MCNC: 9.2 MG/DL (ref 8.4–10.2)
CHLORIDE SERPL-SCNC: 103 MMOL/L (ref 96–112)
CO2 SERPL-SCNC: 20 MMOL/L (ref 20–33)
COLOR UR: YELLOW
CREAT SERPL-MCNC: 1.52 MG/DL (ref 0.5–1.4)
EOSINOPHIL # BLD AUTO: 0.38 K/UL (ref 0–0.51)
EOSINOPHIL NFR BLD: 3 % (ref 0–6.9)
ERYTHROCYTE [DISTWIDTH] IN BLOOD BY AUTOMATED COUNT: 35.3 FL (ref 35.9–50)
FASTING STATUS PATIENT QL REPORTED: NORMAL
GFR SERPLBLD CREATININE-BSD FMLA CKD-EPI: 46 ML/MIN/1.73 M 2
GLUCOSE SERPL-MCNC: 109 MG/DL (ref 65–99)
GLUCOSE UR STRIP.AUTO-MCNC: NEGATIVE MG/DL
HCT VFR BLD AUTO: 35.2 % (ref 42–52)
HGB BLD-MCNC: 10.8 G/DL (ref 14–18)
INR PPP: 1.3 (ref 0.87–1.13)
KETONES UR STRIP.AUTO-MCNC: NEGATIVE MG/DL
LEUKOCYTE ESTERASE UR QL STRIP.AUTO: NEGATIVE
LYMPHOCYTES # BLD AUTO: 6.05 K/UL (ref 1–4.8)
LYMPHOCYTES NFR BLD: 48 % (ref 22–41)
MANUAL DIFF BLD: NORMAL
MCH RBC QN AUTO: 19.6 PG (ref 27–33)
MCHC RBC AUTO-ENTMCNC: 30.7 G/DL (ref 32.3–36.5)
MCV RBC AUTO: 63.9 FL (ref 81.4–97.8)
MICRO URNS: NORMAL
MICROCYTES BLD QL SMEAR: ABNORMAL
MONOCYTES # BLD AUTO: 1.64 K/UL (ref 0–0.85)
MONOCYTES NFR BLD AUTO: 13 % (ref 0–13.4)
NEUTROPHILS # BLD AUTO: 4.54 K/UL (ref 1.82–7.42)
NEUTROPHILS NFR BLD: 36 % (ref 44–72)
NITRITE UR QL STRIP.AUTO: NEGATIVE
NRBC # BLD AUTO: 0 K/UL
NRBC BLD-RTO: 0 /100 WBC (ref 0–0.2)
PH UR STRIP.AUTO: 5.5 [PH] (ref 5–8)
PLATELET # BLD AUTO: 228 K/UL (ref 164–446)
PLATELET BLD QL SMEAR: NORMAL
PMV BLD AUTO: 10.2 FL (ref 9–12.9)
POTASSIUM SERPL-SCNC: 4.2 MMOL/L (ref 3.6–5.5)
PROT UR QL STRIP: NEGATIVE MG/DL
PROTHROMBIN TIME: 16.8 SEC (ref 12–14.6)
RBC # BLD AUTO: 5.51 M/UL (ref 4.7–6.1)
RBC BLD AUTO: PRESENT
RBC UR QL AUTO: NEGATIVE
SODIUM SERPL-SCNC: 136 MMOL/L (ref 135–145)
SP GR UR STRIP.AUTO: 1.02
WBC # BLD AUTO: 12.6 K/UL (ref 4.8–10.8)

## 2024-06-03 PROCEDURE — 87086 URINE CULTURE/COLONY COUNT: CPT

## 2024-06-03 PROCEDURE — 85007 BL SMEAR W/DIFF WBC COUNT: CPT

## 2024-06-03 PROCEDURE — 81003 URINALYSIS AUTO W/O SCOPE: CPT

## 2024-06-03 PROCEDURE — 36415 COLL VENOUS BLD VENIPUNCTURE: CPT

## 2024-06-03 PROCEDURE — 85027 COMPLETE CBC AUTOMATED: CPT

## 2024-06-03 PROCEDURE — 80048 BASIC METABOLIC PNL TOTAL CA: CPT

## 2024-06-03 PROCEDURE — 85610 PROTHROMBIN TIME: CPT

## 2024-06-04 ENCOUNTER — NON-PROVIDER VISIT (OUTPATIENT)
Dept: CARDIOLOGY | Facility: MEDICAL CENTER | Age: 79
End: 2024-06-04
Attending: UROLOGY
Payer: MEDICARE

## 2024-06-04 DIAGNOSIS — Z01.818 PRE-OP EXAM: ICD-10-CM

## 2024-06-04 PROCEDURE — 93005 ELECTROCARDIOGRAM TRACING: CPT

## 2024-06-04 NOTE — PROGRESS NOTES
Patient was here today for walk in EKG.  EKG performed and transferred into patients chart. Paper copy of EKG given to patient to provide to referring provider.

## 2024-06-06 ENCOUNTER — HOME CARE VISIT (OUTPATIENT)
Dept: HOME HEALTH SERVICES | Facility: HOME HEALTHCARE | Age: 79
End: 2024-06-06
Payer: MEDICARE

## 2024-06-06 VITALS
OXYGEN SATURATION: 94 % | SYSTOLIC BLOOD PRESSURE: 110 MMHG | TEMPERATURE: 98.7 F | HEART RATE: 81 BPM | DIASTOLIC BLOOD PRESSURE: 64 MMHG | RESPIRATION RATE: 16 BRPM

## 2024-06-06 LAB
BACTERIA UR CULT: NORMAL
SIGNIFICANT IND 70042: NORMAL
SITE SITE: NORMAL
SOURCE SOURCE: NORMAL

## 2024-06-06 PROCEDURE — G0151 HHCP-SERV OF PT,EA 15 MIN: HCPCS

## 2024-06-06 ASSESSMENT — ENCOUNTER SYMPTOMS
PAIN SEVERITY GOAL: 0/10
HIGHEST PAIN SEVERITY IN PAST 24 HOURS: 0/10
DENIES PAIN: 1
PERSON REPORTING PAIN: PATIENT
LOWEST PAIN SEVERITY IN PAST 24 HOURS: 0/10

## 2024-06-11 ENCOUNTER — HOME CARE VISIT (OUTPATIENT)
Dept: HOME HEALTH SERVICES | Facility: HOME HEALTHCARE | Age: 79
End: 2024-06-11
Payer: MEDICARE

## 2024-06-11 VITALS
SYSTOLIC BLOOD PRESSURE: 106 MMHG | OXYGEN SATURATION: 95 % | RESPIRATION RATE: 16 BRPM | DIASTOLIC BLOOD PRESSURE: 60 MMHG | TEMPERATURE: 99.2 F | HEART RATE: 73 BPM

## 2024-06-11 PROCEDURE — G0151 HHCP-SERV OF PT,EA 15 MIN: HCPCS

## 2024-06-11 ASSESSMENT — ENCOUNTER SYMPTOMS
DENIES PAIN: 1
HIGHEST PAIN SEVERITY IN PAST 24 HOURS: 0/10
PERSON REPORTING PAIN: PATIENT
PAIN SEVERITY GOAL: 0/10
LOWEST PAIN SEVERITY IN PAST 24 HOURS: 0/10

## 2024-06-20 ENCOUNTER — HOME CARE VISIT (OUTPATIENT)
Dept: HOME HEALTH SERVICES | Facility: HOME HEALTHCARE | Age: 79
End: 2024-06-20
Payer: MEDICARE

## 2024-06-20 LAB — EKG IMPRESSION: NORMAL

## 2024-06-20 PROCEDURE — 93010 ELECTROCARDIOGRAM REPORT: CPT | Performed by: INTERNAL MEDICINE

## 2024-06-20 ASSESSMENT — ACTIVITIES OF DAILY LIVING (ADL)
HOME_HEALTH_OASIS: 00
OASIS_M1830: 01

## 2024-06-20 ASSESSMENT — PATIENT HEALTH QUESTIONNAIRE - PHQ9: CLINICAL INTERPRETATION OF PHQ2 SCORE: 0

## 2024-06-20 NOTE — Clinical Note
[facility] [place] discharge completed 6/20/2024.  Pt needing to cancel discharge visit due to conflicting MD appointment.

## 2024-06-21 NOTE — CASE COMMUNICATION
noted  ----- Message -----  From: Ifeoma Connolly, PT  Sent: 6/20/2024   1:51 PM PDT  To: Celine Garcia R.N.; Opal Silverio R.N.; *      [facility] [place] discharge completed 6/20/2024.  Pt needing to cancel discharge visit due to conflicting MD appointment.

## 2024-06-24 ENCOUNTER — HOME CARE VISIT (OUTPATIENT)
Dept: HOME HEALTH SERVICES | Facility: HOME HEALTHCARE | Age: 79
End: 2024-06-24
Payer: MEDICARE

## 2024-06-24 NOTE — Clinical Note
I agree with these changes.  Thank you.    ----- Message -----  From: Meron Shelton R.N.  Sent: 6/24/2024   3:41 PM PDT  To: Ifeoma Connolly, PT      Quality Review for [place] [facility] by SHEILA Shelton, RN on  June 24, 2024     Edits completed by SHEILA Shelton, RN:  1. Changed  C to NA per the plan of care

## 2024-06-24 NOTE — CASE COMMUNICATION
Quality Review for [facility] by SHEILA Shelton RN on  June 24, 2024     Edits completed by SHEILA Shelton RN:  1. Changed  C to NA per the plan of care

## 2024-07-01 ENCOUNTER — APPOINTMENT (OUTPATIENT)
Dept: LAB | Facility: MEDICAL CENTER | Age: 79
End: 2024-07-01
Attending: PHYSICIAN ASSISTANT
Payer: MEDICARE

## 2024-07-01 ENCOUNTER — HOSPITAL ENCOUNTER (OUTPATIENT)
Dept: LAB | Facility: MEDICAL CENTER | Age: 79
End: 2024-07-01
Attending: FAMILY MEDICINE
Payer: MEDICARE

## 2024-07-01 LAB
ALBUMIN SERPL BCP-MCNC: 4.1 G/DL (ref 3.2–4.9)
ALBUMIN/GLOB SERPL: 1.6 G/DL
ALP SERPL-CCNC: 36 U/L (ref 30–99)
ALT SERPL-CCNC: 19 U/L (ref 2–50)
ANION GAP SERPL CALC-SCNC: 12 MMOL/L (ref 7–16)
AST SERPL-CCNC: 17 U/L (ref 12–45)
BILIRUB SERPL-MCNC: 0.8 MG/DL (ref 0.1–1.5)
BUN SERPL-MCNC: 29 MG/DL (ref 8–22)
CALCIUM ALBUM COR SERPL-MCNC: 8.9 MG/DL (ref 8.5–10.5)
CALCIUM SERPL-MCNC: 9 MG/DL (ref 8.4–10.2)
CHLORIDE SERPL-SCNC: 106 MMOL/L (ref 96–112)
CHOLEST SERPL-MCNC: 126 MG/DL (ref 100–199)
CO2 SERPL-SCNC: 21 MMOL/L (ref 20–33)
CREAT SERPL-MCNC: 1.58 MG/DL (ref 0.5–1.4)
EST. AVERAGE GLUCOSE BLD GHB EST-MCNC: 111 MG/DL
FASTING STATUS PATIENT QL REPORTED: NORMAL
GFR SERPLBLD CREATININE-BSD FMLA CKD-EPI: 44 ML/MIN/1.73 M 2
GLOBULIN SER CALC-MCNC: 2.5 G/DL (ref 1.9–3.5)
GLUCOSE SERPL-MCNC: 97 MG/DL (ref 65–99)
HBA1C MFR BLD: 5.5 % (ref 4–5.6)
HDLC SERPL-MCNC: 33 MG/DL
LDLC SERPL CALC-MCNC: 75 MG/DL
POTASSIUM SERPL-SCNC: 4.2 MMOL/L (ref 3.6–5.5)
PROT SERPL-MCNC: 6.6 G/DL (ref 6–8.2)
SODIUM SERPL-SCNC: 139 MMOL/L (ref 135–145)
TRIGL SERPL-MCNC: 92 MG/DL (ref 0–149)

## 2024-07-01 PROCEDURE — 83036 HEMOGLOBIN GLYCOSYLATED A1C: CPT

## 2024-07-01 PROCEDURE — 80053 COMPREHEN METABOLIC PANEL: CPT

## 2024-07-01 PROCEDURE — 80061 LIPID PANEL: CPT

## 2024-07-01 PROCEDURE — 36415 COLL VENOUS BLD VENIPUNCTURE: CPT

## 2024-07-11 DIAGNOSIS — I48.92 PAROXYSMAL ATRIAL FLUTTER (HCC): ICD-10-CM

## 2024-07-11 RX ORDER — AMIODARONE HYDROCHLORIDE 200 MG/1
200 TABLET ORAL DAILY
Qty: 90 TABLET | Refills: 1 | Status: SHIPPED | OUTPATIENT
Start: 2024-07-11

## 2024-07-23 ENCOUNTER — PHYSICAL THERAPY (OUTPATIENT)
Dept: PHYSICAL THERAPY | Facility: MEDICAL CENTER | Age: 79
End: 2024-07-23
Attending: STUDENT IN AN ORGANIZED HEALTH CARE EDUCATION/TRAINING PROGRAM
Payer: MEDICARE

## 2024-07-23 DIAGNOSIS — R26.89 OTHER ABNORMALITIES OF GAIT AND MOBILITY: ICD-10-CM

## 2024-07-23 PROCEDURE — 97110 THERAPEUTIC EXERCISES: CPT

## 2024-07-23 PROCEDURE — 97162 PT EVAL MOD COMPLEX 30 MIN: CPT

## 2024-07-23 SDOH — ECONOMIC STABILITY: GENERAL: QUALITY OF LIFE: GOOD

## 2024-07-23 ASSESSMENT — BALANCE ASSESSMENTS
STANDING UNSUPPORTED WITH FEET TOGETHER: 2
STANDING TO SITTING: 4
REACHING FORWARD WITH OUTSTRETCHED ARM WHILE STANDING: 3
PICK UP OBJECT FROM THE FLOOR FROM A STANDING POSITION: 3
STANDING ON ONE LEG: 0
STANDING UNSUPPORTED: 3
PLACE ALTERNATE FOOT ON STEP OR STOOL WHILE STANDING UNSUPPORTED: 0
TRANSFERS: 2
LONG VERSION TOTAL SCORE (MAX 56): 33
LONG VERSION TOTAL SCORE (MAX 56): 33
SITTING TO STANDING: 3
STANDING UNSUPPORTED WITH EYES CLOSED: 3
LOOK OVER LEFT AND RIGHT SHOULDERS WHILE STANDING: 2
SITTING UNSUPPORTED: 3
MEDICARE IMPAIRMENT PERCENTAGE: 41
STANDING UNSUPPORTED ONE FOOT IN FRONT: 3
TURN 360 DEGREES: 2

## 2024-07-23 ASSESSMENT — ACTIVITIES OF DAILY LIVING (ADL): POOR_BALANCE: 1

## 2024-07-29 ENCOUNTER — HOSPITAL ENCOUNTER (OUTPATIENT)
Dept: LAB | Facility: MEDICAL CENTER | Age: 79
End: 2024-07-29
Attending: PHYSICIAN ASSISTANT
Payer: MEDICARE

## 2024-07-29 ENCOUNTER — APPOINTMENT (OUTPATIENT)
Dept: LAB | Facility: MEDICAL CENTER | Age: 79
End: 2024-07-29
Attending: STUDENT IN AN ORGANIZED HEALTH CARE EDUCATION/TRAINING PROGRAM
Payer: MEDICARE

## 2024-07-29 LAB
ALBUMIN SERPL BCP-MCNC: 4.1 G/DL (ref 3.2–4.9)
ALBUMIN/GLOB SERPL: 1.7 G/DL
ALP SERPL-CCNC: 35 U/L (ref 30–99)
ALT SERPL-CCNC: 14 U/L (ref 2–50)
ANION GAP SERPL CALC-SCNC: 9 MMOL/L (ref 7–16)
AST SERPL-CCNC: 15 U/L (ref 12–45)
BILIRUB SERPL-MCNC: 0.9 MG/DL (ref 0.1–1.5)
BUN SERPL-MCNC: 29 MG/DL (ref 8–22)
CALCIUM ALBUM COR SERPL-MCNC: 9.1 MG/DL (ref 8.5–10.5)
CALCIUM SERPL-MCNC: 9.2 MG/DL (ref 8.4–10.2)
CHLORIDE SERPL-SCNC: 107 MMOL/L (ref 96–112)
CO2 SERPL-SCNC: 23 MMOL/L (ref 20–33)
CREAT SERPL-MCNC: 1.68 MG/DL (ref 0.5–1.4)
FASTING STATUS PATIENT QL REPORTED: NORMAL
GFR SERPLBLD CREATININE-BSD FMLA CKD-EPI: 41 ML/MIN/1.73 M 2
GLOBULIN SER CALC-MCNC: 2.4 G/DL (ref 1.9–3.5)
GLUCOSE SERPL-MCNC: 96 MG/DL (ref 65–99)
POTASSIUM SERPL-SCNC: 4.4 MMOL/L (ref 3.6–5.5)
PROT SERPL-MCNC: 6.5 G/DL (ref 6–8.2)
SODIUM SERPL-SCNC: 139 MMOL/L (ref 135–145)

## 2024-07-29 PROCEDURE — 80053 COMPREHEN METABOLIC PANEL: CPT

## 2024-07-29 PROCEDURE — 36415 COLL VENOUS BLD VENIPUNCTURE: CPT

## 2024-07-30 ENCOUNTER — TELEPHONE (OUTPATIENT)
Dept: CARDIOLOGY | Facility: MEDICAL CENTER | Age: 79
End: 2024-07-30
Payer: MEDICARE

## 2024-08-01 ENCOUNTER — APPOINTMENT (OUTPATIENT)
Dept: PHYSICAL THERAPY | Facility: MEDICAL CENTER | Age: 79
End: 2024-08-01
Payer: MEDICARE

## 2024-08-02 ENCOUNTER — HOSPITAL ENCOUNTER (OUTPATIENT)
Dept: LAB | Facility: MEDICAL CENTER | Age: 79
End: 2024-08-02
Attending: PHYSICIAN ASSISTANT
Payer: MEDICARE

## 2024-08-02 DIAGNOSIS — Z79.899 LONG TERM CURRENT USE OF AMIODARONE: ICD-10-CM

## 2024-08-02 LAB
ALBUMIN SERPL BCP-MCNC: 4.3 G/DL (ref 3.2–4.9)
ALBUMIN/GLOB SERPL: 2 G/DL
ALP SERPL-CCNC: 38 U/L (ref 30–99)
ALT SERPL-CCNC: 17 U/L (ref 2–50)
ANION GAP SERPL CALC-SCNC: 13 MMOL/L (ref 7–16)
AST SERPL-CCNC: 15 U/L (ref 12–45)
BILIRUB SERPL-MCNC: 0.5 MG/DL (ref 0.1–1.5)
BUN SERPL-MCNC: 40 MG/DL (ref 8–22)
CALCIUM ALBUM COR SERPL-MCNC: 9 MG/DL (ref 8.5–10.5)
CALCIUM SERPL-MCNC: 9.2 MG/DL (ref 8.4–10.2)
CHLORIDE SERPL-SCNC: 103 MMOL/L (ref 96–112)
CO2 SERPL-SCNC: 21 MMOL/L (ref 20–33)
CREAT SERPL-MCNC: 1.5 MG/DL (ref 0.5–1.4)
FASTING STATUS PATIENT QL REPORTED: NORMAL
GFR SERPLBLD CREATININE-BSD FMLA CKD-EPI: 47 ML/MIN/1.73 M 2
GLOBULIN SER CALC-MCNC: 2.2 G/DL (ref 1.9–3.5)
GLUCOSE SERPL-MCNC: 109 MG/DL (ref 65–99)
POTASSIUM SERPL-SCNC: 4.6 MMOL/L (ref 3.6–5.5)
PROT SERPL-MCNC: 6.5 G/DL (ref 6–8.2)
SODIUM SERPL-SCNC: 137 MMOL/L (ref 135–145)
T4 FREE SERPL-MCNC: 1.54 NG/DL (ref 0.93–1.7)
TSH SERPL DL<=0.005 MIU/L-ACNC: 3.86 UIU/ML (ref 0.38–5.33)

## 2024-08-02 PROCEDURE — 84443 ASSAY THYROID STIM HORMONE: CPT

## 2024-08-02 PROCEDURE — 80053 COMPREHEN METABOLIC PANEL: CPT

## 2024-08-02 PROCEDURE — 36415 COLL VENOUS BLD VENIPUNCTURE: CPT

## 2024-08-02 PROCEDURE — 84439 ASSAY OF FREE THYROXINE: CPT

## 2024-08-05 ENCOUNTER — PHYSICAL THERAPY (OUTPATIENT)
Dept: PHYSICAL THERAPY | Facility: MEDICAL CENTER | Age: 79
End: 2024-08-05
Attending: STUDENT IN AN ORGANIZED HEALTH CARE EDUCATION/TRAINING PROGRAM
Payer: MEDICARE

## 2024-08-05 DIAGNOSIS — R26.89 OTHER ABNORMALITIES OF GAIT AND MOBILITY: ICD-10-CM

## 2024-08-05 PROCEDURE — 97110 THERAPEUTIC EXERCISES: CPT

## 2024-08-05 PROCEDURE — 97112 NEUROMUSCULAR REEDUCATION: CPT

## 2024-08-05 NOTE — OP THERAPY DAILY TREATMENT
"  Outpatient Physical Therapy  DAILY TREATMENT     [facility] Outpatient Physical Therapy  [address]  [place] [address]  Phone:  [phone]  Fax:  [phone]    Date: 08/05/2024    Patient: Hardy Roca  YOB: 1945  MRN: [id number]     Time Calculation    Start time: 1058  Stop time: 1145 Time Calculation (min): 47 minutes         Chief Complaint: Difficulty Walking    Visit #: 2    SUBJECTIVE:  Doing fine. No vertigo. Did do \"some of the HEP, but not a lot.\"    OBJECTIVE:      Therapeutic Exercises (CPT 02736):     1. NuStep, L1 x 6 min, goal to stay over 60 spm    2. gastroc stretch, slant board x 1 min    3. Shuttle, NT    Therapeutic Treatments and Modalities:     1. Neuromuscular Re-education (CPT 80212)    Therapeutic Treatment and Modalities Summary:   [place] Sensory Organization Performance (SOP) test:  1) Standard Eyes Open= N  2) Standard Eyes Closed= S  3) Modified Tandem Eyes Open= N  4) Modified Tandem Eyes Closed= S  5) Foam Eyes Open= S  6) Foam Eyes Closed= F  7) [place] Step= NT    Key: N=Normal, S= Sway, F= Fall, R= Right, L= Left    // bars:  - Ankle sways: AP EO/EC x 1 min ea. Circles EO x 1 min   - Narrow balance EC x 1 min   - Foam balance EO 2x 2 min   - Foam balance EO with 2 way HTs 2x10     Education regarding balance sensory systems    Time-based treatments/modalities:    Physical Therapy Timed Treatment Charges  Neuromusc re-ed, balance, coor, post minutes (CPT 84040): 35 minutes  Therapeutic exercise minutes (CPT 74860): 10 minutes      ASSESSMENT:   Response to treatment: Requires frequent seated rest due to poor endurance. Exaggerated sway in LEs during foam tasks. Poor ankle reflexes. Participatory and shows learning response.     PLAN/RECOMMENDATIONS:   Plan for treatment: therapy treatment to continue next visit.  Planned interventions for next visit: continue with current treatment.         "

## 2024-08-07 NOTE — OP THERAPY DAILY TREATMENT
Outpatient Physical Therapy  DAILY TREATMENT     [facility] Outpatient Physical Therapy  [address]  [place] [address]  Phone:  [phone]  Fax:  [phone]    Date: 08/08/2024    Patient: Taye Roca  YOB: 1945  MRN: [id number]     Time Calculation    Start time: 1100  Stop time: 1145 Time Calculation (min): 45 minutes         Chief Complaint: Loss Of Balance    Visit #: 3    SUBJECTIVE:  I did fine after last visit. Did order the foam pad. Should be here this weekend.     OBJECTIVE:      Therapeutic Exercises (CPT 52095):     1. NuStep, L3 x 5 min, goal to stay over 60 spm    2. gastroc stretch, slant board x 1 min    3. Shuttle, Squat: B 4C 2x10, SL 2C x 10 ea    Therapeutic Treatments and Modalities:     1. Neuromuscular Re-education (CPT 02023)    Therapeutic Treatment and Modalities Summary:   // bars:  - Ankle sways: AP EO/EC x 1 min ea. Circles EO x 1 min   - Narrow balance EC x 1 min   - Foam balance EO 2x 2 min   - Foam balance EO with 2 way HTs 2x10   - Foam step up and overs x 20 without UEs.   - Foam balance EC (close SGA-CGA) best effort x 1 min     Access Code: [email or web address]  URL: [email or web address]  Date: 08/08/2024  Prepared by:     Exercises  - Sit to Stand Without Arm Support  - 1 x daily - 2 sets - 10-15 reps  - Heel Raises with Counter Support  - 1 x daily - 20 reps  - Body [place] Sway  - 1 x daily - 60 seconds hold  - Semi-Tandem Corner Balance With Eyes Closed  - 1 x daily - 60 seconds hold    Time-based treatments/modalities:    Physical Therapy Timed Treatment Charges  Neuromusc re-ed, balance, coor, post minutes (CPT 86031): 30 minutes  Therapeutic exercise minutes (CPT 24719): 15 minutes    ASSESSMENT:   Response to treatment: Only 2 seated rest break compared to many (even between sets) last week. Self limiting at times, but pushes himself with encouragement. Reports 50% use of energy today and appropriate  fatigue in LEs. Good carryover of ankle strategies on foam from last visit. Able to add intro dynamic challenges.     PLAN/RECOMMENDATIONS:   Plan for treatment: therapy treatment to continue next visit.  Planned interventions for next visit: continue with current treatment.

## 2024-08-08 ENCOUNTER — PHYSICAL THERAPY (OUTPATIENT)
Dept: PHYSICAL THERAPY | Facility: MEDICAL CENTER | Age: 79
End: 2024-08-08
Attending: STUDENT IN AN ORGANIZED HEALTH CARE EDUCATION/TRAINING PROGRAM
Payer: MEDICARE

## 2024-08-08 DIAGNOSIS — R26.89 OTHER ABNORMALITIES OF GAIT AND MOBILITY: ICD-10-CM

## 2024-08-08 PROCEDURE — 97110 THERAPEUTIC EXERCISES: CPT

## 2024-08-08 PROCEDURE — 97112 NEUROMUSCULAR REEDUCATION: CPT

## 2024-08-12 ENCOUNTER — PHYSICAL THERAPY (OUTPATIENT)
Dept: PHYSICAL THERAPY | Facility: MEDICAL CENTER | Age: 79
End: 2024-08-12
Attending: STUDENT IN AN ORGANIZED HEALTH CARE EDUCATION/TRAINING PROGRAM
Payer: MEDICARE

## 2024-08-12 DIAGNOSIS — R26.89 OTHER ABNORMALITIES OF GAIT AND MOBILITY: ICD-10-CM

## 2024-08-12 PROCEDURE — 97110 THERAPEUTIC EXERCISES: CPT

## 2024-08-12 PROCEDURE — 97112 NEUROMUSCULAR REEDUCATION: CPT

## 2024-08-12 NOTE — OP THERAPY DAILY TREATMENT
"  Outpatient Physical Therapy  DAILY TREATMENT     [facility] Outpatient Physical Therapy  [address]  [place] [address]  Phone:  [phone]  Fax:  [phone]    Date: 08/12/2024    Patient: Taye Roca  YOB: 1945  MRN: [id number]     Time Calculation    Start time: 1101  Stop time: 1145 Time Calculation (min): 44 minutes         Chief Complaint: Loss Of Balance    Visit #: 4    SUBJECTIVE:  I've been doing fine after visits. Spouse attends today's visit to assist in supporting him with his HEP. Did receive the foam pad this weekend.     OBJECTIVE:      Therapeutic Exercises (CPT 09725):     1. NuStep, L3 x 5:08, goal to finish 1 lap as quickly as possible    2. gastroc stretch, slant board x 1 min    3. Shuttle, Squat: B 4C 2x10, SL 2C x 10 ea    Therapeutic Treatments and Modalities:     1. Neuromuscular Re-education (CPT 69564)    Therapeutic Treatment and Modalities Summary:   // bars:  - 4 way balance on tilt: EC attempts x 30\" ea  - Unilateral row in staggered stance: 15# 1x10 ea  - Chops 10# 1x20   - Long airex: AP with finger tip support. X6 laps  - Foam standing with mock golf swing: optimal challenge    Access Code: [email or web address]  URL: [email or web address]  Date: 08/08/2024  Prepared by:     Exercises  - Sit to Stand Without Arm Support  - 1 x daily - 2 sets - 10-15 reps  - Heel Raises with Counter Support  - 1 x daily - 20 reps  - Body [place] Sway  - 1 x daily - 60 seconds hold  - Semi-Tandem Corner Balance With Eyes Closed  - 1 x daily - 60 seconds hold    Time-based treatments/modalities:    Physical Therapy Timed Treatment Charges  Neuromusc re-ed, balance, coor, post minutes (CPT 02828): 28 minutes  Therapeutic exercise minutes (CPT 33555): 15 minutes    ASSESSMENT:   Response to treatment: Steady progress. Good carryover of adaptation to compliant surfaces from prior sessions allowing advancement to more dynamic tasks today. "     PLAN/RECOMMENDATIONS:   Plan for treatment: therapy treatment to continue next visit.  Planned interventions for next visit: continue with current treatment.

## 2024-08-14 NOTE — OP THERAPY DAILY TREATMENT
"  Outpatient Physical Therapy  DAILY TREATMENT     [facility] Outpatient Physical Therapy  [address]  [place] [address]  Phone:  [phone]  Fax:  [phone]    Date: 08/15/2024    Patient: Taye Roca  YOB: 1945  MRN: [id number]     Time Calculation    Start time: 1100  Stop time: 1155 Time Calculation (min): 55 minutes         Chief Complaint: Loss Of Balance    Visit #: 5    SUBJECTIVE:  I'm feeling fatigued, weak, dizzy and shaky. Peeked 3 days ago and seems to be improving each day. Does not correlate this with his last visit. Will be seeing Dr. Leary tomorrow.     OBJECTIVE:      Therapeutic Exercises (CPT 73835):     1. NuStep, L3 x 6    2. gastroc stretch, slant board x 1 min    Therapeutic Treatments and Modalities:     1. Canalith Repositioning (CPT 49692), R epley completed x 2. Nystagmus was fully resolved on the 2nd attempt.    2. Therapeutic Activities (CPT 15241)    Therapeutic Treatment and Modalities Summary:   Re-test of positional exam: + R paola hallpike for R torsional nystagmus, latent 2\" and duration 12\". CRM performed (above)  BPPV education provided to pt and spouse  Discussed pxns: suggested not sleeping on the R side for 1 night. Begin to test provoking positions after 24 hrs  Answered questions regarding tx expectations and relapses to there satisfaction       Access Code: [email or web address]  URL: [email or web address]  Date: 08/08/2024  Prepared by:     Exercises  - Sit to Stand Without Arm Support  - 1 x daily - 2 sets - 10-15 reps  - Heel Raises with Counter Support  - 1 x daily - 20 reps  - Body [place] Sway  - 1 x daily - 60 seconds hold  - Semi-Tandem Corner Balance With Eyes Closed  - 1 x daily - 60 seconds hold    Time-based treatments/modalities:    Physical Therapy Timed Treatment Charges  Therapeutic activity minutes (CPT 79488): 20 minutes  Therapeutic exercise minutes (CPT 85458): 10 minutes    ASSESSMENT: "   Response to treatment: Pt with evidence of R PC-BPPC, either persistent and false negative when previously assessed or refractory. Either way, showed good success with use of the R Epley. Nystagmus was fully resolved on the 2nd attempt suggesting resolution. Will re-test next visit and return to balance training as indicated.     PLAN/RECOMMENDATIONS:   Plan for treatment: therapy treatment to continue next visit.  Planned interventions for next visit: continue with current treatment.

## 2024-08-15 ENCOUNTER — PHYSICAL THERAPY (OUTPATIENT)
Dept: PHYSICAL THERAPY | Facility: MEDICAL CENTER | Age: 79
End: 2024-08-15
Attending: STUDENT IN AN ORGANIZED HEALTH CARE EDUCATION/TRAINING PROGRAM
Payer: MEDICARE

## 2024-08-15 DIAGNOSIS — R26.89 OTHER ABNORMALITIES OF GAIT AND MOBILITY: ICD-10-CM

## 2024-08-15 PROCEDURE — 97110 THERAPEUTIC EXERCISES: CPT

## 2024-08-15 PROCEDURE — 97530 THERAPEUTIC ACTIVITIES: CPT | Mod: KX

## 2024-08-15 PROCEDURE — 95992 CANALITH REPOSITIONING PROC: CPT | Mod: KX

## 2024-08-16 ENCOUNTER — OFFICE VISIT (OUTPATIENT)
Dept: NEUROLOGY | Facility: MEDICAL CENTER | Age: 79
End: 2024-08-16
Attending: PSYCHIATRY & NEUROLOGY
Payer: MEDICARE

## 2024-08-16 VITALS
TEMPERATURE: 97.9 F | SYSTOLIC BLOOD PRESSURE: 112 MMHG | HEART RATE: 83 BPM | OXYGEN SATURATION: 96 % | WEIGHT: 192.02 LBS | DIASTOLIC BLOOD PRESSURE: 60 MMHG | RESPIRATION RATE: 16 BRPM | HEIGHT: 68 IN | BODY MASS INDEX: 29.1 KG/M2

## 2024-08-16 DIAGNOSIS — H81.13 BPPV (BENIGN PAROXYSMAL POSITIONAL VERTIGO), BILATERAL: ICD-10-CM

## 2024-08-16 DIAGNOSIS — J63.4 SUPERFICIAL SIDEROSIS PRESENT ON MAGNETIC RESONANCE IMAGING (HCC): ICD-10-CM

## 2024-08-16 DIAGNOSIS — Z86.79 HISTORY OF CEREBRAL ATHEROSCLEROSIS: ICD-10-CM

## 2024-08-16 PROCEDURE — G2212 PROLONG OUTPT/OFFICE VIS: HCPCS | Performed by: PSYCHIATRY & NEUROLOGY

## 2024-08-16 PROCEDURE — 3074F SYST BP LT 130 MM HG: CPT | Performed by: PSYCHIATRY & NEUROLOGY

## 2024-08-16 PROCEDURE — 99212 OFFICE O/P EST SF 10 MIN: CPT | Performed by: PSYCHIATRY & NEUROLOGY

## 2024-08-16 PROCEDURE — 3078F DIAST BP <80 MM HG: CPT | Performed by: PSYCHIATRY & NEUROLOGY

## 2024-08-16 PROCEDURE — 99205 OFFICE O/P NEW HI 60 MIN: CPT | Performed by: PSYCHIATRY & NEUROLOGY

## 2024-08-16 ASSESSMENT — ENCOUNTER SYMPTOMS
MEMORY LOSS: 0
HEADACHES: 0
FOCAL WEAKNESS: 0
BLOOD IN STOOL: 0
DIZZINESS: 1
LOSS OF CONSCIOUSNESS: 0
TREMORS: 0
SPEECH CHANGE: 0
BRUISES/BLEEDS EASILY: 0
TINGLING: 0
FALLS: 0
SEIZURES: 0

## 2024-08-16 ASSESSMENT — FIBROSIS 4 INDEX: FIB4 SCORE: 1.26

## 2024-08-16 NOTE — ASSESSMENT & PLAN NOTE
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  He is also on a statin agent.  He will continue the physical therapy for postural instability and gait training.  Imaging of the brain can be done as per the above reasoning, and at 1 year we will get a sense of whether or not these chronic changes are advancing.  This certainly will be a contributing issue when it comes to his gait ataxia.    Orders:    MR-MRA HEAD-W/O; Future    MR-BRAIN-W/O; Future

## 2024-08-16 NOTE — ASSESSMENT & PLAN NOTE
An incidental finding, it is certainly not an explanation for the vertiginous episodes, not related to the balance problems that he is dealing with, but it still needs to be evaluated.  He has no history of dementia or focal signs or symptoms suggestive of asymptomatic hemorrhage in the past, but he is also on lifelong anticoagulation which also increases the risk of spontaneous recurrence.  MRA of the brain should be performed to rule out underlying vascular anomaly that would increase bleeding recurrence risk.  MRI of the brain should be repeated in the next 8 months or so to be sure these lesions are static.  He has nothing by history suggest that this might be CAA.  We will contact him with results if significant, otherwise he is seems to be safe continuing the Eliquis.    Orders:    MR-MRA HEAD-W/O; Future    MR-BRAIN-W/O; Future

## 2024-08-16 NOTE — PROGRESS NOTES
Subjective     Taye Roca is a 78 y.o. male who presents with his wife Cammie, from the office of Dr. Leticia Wilkerson MD, for consultation, with a history of recent onset paroxysmal vertigo, but who has had persistent balance difficulties since that time and was imaging revealed incidental right hemispheric superficial siderosis, this in a patient on chronic anticoagulation.     CHRISTOPH Kothari is a very pleasant 78-year-old right-handed gentleman whose vertiginous symptoms started rather suddenly on April 16, 2018.  He remembers sitting up suddenly out of bed and suffering from rather severe vertigo.  At the time he realized that when he was supine things seem to improve, always worsening when he would sit up or stand to try to stand up.  Sudden movements as well regardless of direction or nature would also worsen the symptoms.  They would be self-limited, improving, only to recur with movement or position.  He denied any headache, nausea vomiting, visual loss or double vision, loss of consciousness, etc.    He presented to the emergency room on April 16, and was admitted for a presumed diagnosis of BPPV.  CTA of the brain revealed no significant large vessel occlusion, CT of the brain revealed no hemorrhage.  MRI of the brain did reveal incidental right cortical parietal and frontal superficial siderosis without evidence of underlying acute ischemia.  Blood work revealed a mild anemia, TSH was normal, Echocardiogram revealed no underlying structural pathology.  Omayra-Hallpike maneuvers demonstrated a left-sided symptom predominance, he was treated with Epley's maneuvers on April 17 and then 18, the symptoms effectively resolved.  He did well through the remaining portions of the hospitalization, though he did have some persistent balance difficulties and rare dizziness.  He was discharged on April 24, 2024.    Though he remained asymptomatic until symptoms recurred on August 13, he was having some balance  difficulties.  This time when they recurred, the vertigo was worse when laying on the right side.  Already in therapy for his balance difficulties, Epley's maneuvers again to the right were performed on 2 occasions now with complete symptomatic relief.    The balance problems have been rather significant, he had had no issues prior to the first event of BPPV back in April, he notes a simple unsteadiness as he walks.  There is no directionality to the unsteadiness, he has no problems standing up and moving, stride length is not shortened, he does not shuffle, there has been no problem with tremor, loss of strength or dexterity with the upper extremities, change in voice volume and clarity, headache, loss of smell, focal weakness in the legs, paresthesias or other sensory distortions in the feet, etc.  With gait training over the last month, things are slowly improving, he is now ambulating with less need for an assistive device or the back of a piece of furniture, wall, etc.    MRI scan did reveal the incidental superficial siderosis.  He has been on anticoagulation since approximately 2015.  He has had no recent falls, denies episodes of seizure-like activity with altered sensorium, visual distortion, focal left-sided motor or sensory symptoms, feelings of déjà vu, jamais vu, depersonalization or derealization, etc.  Cognition has been intact.    He has a medical history of A-fib/flutter, KOKI, dyslipidemia and BPH.  There is no history of CVA, CAD, PVD, liver or kidney disease, autoimmune disease, malignancy, thyroid disease, blood dyscrasia, neurodegenerative disease, MS or seizures.    There is no surgical history of note from my standpoint.    No one in the family that he is aware of has ever suffered from a history of BPPV or similar symptoms, neurodegenerative disease, CVA or ICH.    He quit alcohol use years ago, does not smoke cigarettes.    He is on Eliquis 5 mg twice daily, Cordarone 200 mg daily, Lexapro  "10 mg daily, Zocor 20 mg every evening and Hytrin 10 mg every evening.    Review of Systems   Constitutional:  Negative for malaise/fatigue.   Cardiovascular:  Positive for leg swelling.   Gastrointestinal:  Negative for blood in stool and melena.   Musculoskeletal:  Negative for falls.   Neurological:  Positive for dizziness. Negative for tingling, tremors, speech change, focal weakness, seizures, loss of consciousness and headaches.   Endo/Heme/Allergies:  Does not bruise/bleed easily.   Psychiatric/Behavioral:  Negative for memory loss.    All other systems reviewed and are negative.    Objective     /60 (BP Location: Right arm, Patient Position: Sitting, BP Cuff Size: Adult)   Pulse 83   Temp 36.6 °C (97.9 °F) (Temporal)   Resp 16   Ht 1.727 m (5' 8\")   Wt 87.1 kg (192 lb 0.3 oz)   SpO2 96%   BMI 29.20 kg/m²      Physical Exam    He appears in no acute distress.  Vital signs are stable.  There is no malar rash or jaw claudication.  The neck is supple, range of motion is full, carotid pulses are present bilaterally without asymmetry.  Cardiac evaluation reveals an irregular rhythm.  There is mild bilateral pretibial edema.  Distal pulses are present.     Neurological Exam    Fully oriented, there is no aphasia, apraxia, or inattention.    PERRLA/EOMI, visual fields are full to movement detection on confrontation bilaterally.  There is no hypophonia or bradykinesia, facial movements are symmetric, sensory exam is intact to temperature and light touch bilaterally.  The tongue and uvula are midline, there is no bulbar dysfunction.  Jaw movements are intact, jaw jerk is absent.  Shoulder shrug and head rotation are normal.    Musculoskeletal exam reveals normal tone in the upper extremities, even with distraction there is no rigidity or spasticity seen.  There is no tremor, asterixis, or drift.  Strength is intact in all 4 extremities without asymmetry.    Ankle jerks are present though relatively " diminished, reflexes are otherwise brisk and symmetric.  The toes are downgoing bilaterally.    He is unsteady as he walks, there is a slightly apractic quality to his gait, stride length is shortened, he does not shuffle.  He looks at the ground intermittently.  Station is widened.  Tandem walking is an impossibility, with assistance he can walk on his toes.  There is no appendicular dystaxia.  Repetitive movements are symmetric, with normal amplitude and frequencies.    Sensory exam reveals a stocking pattern decameter vibration below the knees, but JPS, pinprick and temperature are intact throughout.  Romberg is absent.    Assessment & Plan     Assessment & Plan  Superficial siderosis present on magnetic resonance imaging (HCC)  An incidental finding, it is certainly not an explanation for the vertiginous episodes, not related to the balance problems that he is dealing with, but it still needs to be evaluated.  He has no history of dementia or focal signs or symptoms suggestive of asymptomatic hemorrhage in the past, but he is also on lifelong anticoagulation which also increases the risk of spontaneous recurrence.  MRA of the brain should be performed to rule out underlying vascular anomaly that would increase bleeding recurrence risk.  MRI of the brain should be repeated in the next 8 months or so to be sure these lesions are static.  He has nothing by history suggest that this might be CAA.  We will contact him with results if significant, otherwise he is seems to be safe continuing the Eliquis.    Orders:    MR-MRA HEAD-W/O; Future    MR-BRAIN-W/O; Future    History of cerebral atherosclerosis  He does have a rather significant degree of chronic atherosclerotic change in both cerebral hemispheres, he lacks a history of typical risk, but he is already on an anticoagulant because of his arrhythmia.  He is also on a statin agent.  He will continue the physical therapy for postural instability and gait training.   Imaging of the brain can be done as per the above reasoning, and at 1 year we will get a sense of whether or not these chronic changes are advancing.  This certainly will be a contributing issue when it comes to his gait ataxia.    Orders:    MR-MRA HEAD-W/O; Future    MR-BRAIN-W/O; Future    BPPV (benign paroxysmal positional vertigo), bilateral  Stable, he should be trained in modified Epley's maneuvers so that the symptoms can be treated while he is at home.  Particle repositioning maneuvers have been effective for him in the past, this would simply allow him to avoid coming into the emergency room.    We will follow-up in the next 4 to 5 months, communication via [employer] as studies come in, and then in 1 year after his repeat MRI scan.    Time: 80 minutes in total spent on patient care including pre-charting, record review, discussion with healthcare staff and documentation.  This includes face-to-face time for exam, review, discussion, as well as counseling and coordinating care.

## 2024-08-16 NOTE — ASSESSMENT & PLAN NOTE
Stable, he should be trained in modified Epley's maneuvers so that the symptoms can be treated while he is at home.  Particle repositioning maneuvers have been effective for him in the past, this would simply allow him to avoid coming into the emergency room.    We will follow-up in the next 4 to 5 months, communication via [employer] as studies come in, and then in 1 year after his repeat MRI scan.    Time: 80 minutes in total spent on patient care including pre-charting, record review, discussion with healthcare staff and documentation.  This includes face-to-face time for exam, review, discussion, as well as counseling and coordinating care.

## 2024-08-19 ENCOUNTER — APPOINTMENT (OUTPATIENT)
Dept: PHYSICAL THERAPY | Facility: MEDICAL CENTER | Age: 79
End: 2024-08-19
Attending: STUDENT IN AN ORGANIZED HEALTH CARE EDUCATION/TRAINING PROGRAM
Payer: MEDICARE

## 2024-08-21 NOTE — OP THERAPY DAILY TREATMENT
"  Outpatient Physical Therapy  DAILY TREATMENT     [facility] Outpatient Physical Therapy  [address]  [place] [address]  Phone:  [phone]  Fax:  [phone]    Date: 08/22/2024    Patient: Taye Roca  YOB: 1945  MRN: [id number]     Time Calculation    Start time: 1102  Stop time: 1144 Time Calculation (min): 42 minutes         Chief Complaint: Difficulty Walking and Loss Of Balance    Visit #: 6    SUBJECTIVE:  Does not recall education from the prior visit. Recalls the experience being frightening and feeling out of control. Spouse reports she has noticed he is still having mild vertigo when he turns to the R in bed. Did see Dr. Leary (neurology) and is being referred for additional imaging.     OBJECTIVE:      Therapeutic Treatments and Modalities:     1. Canalith Repositioning (CPT 30893), R epley completed x 2. Nystagmus was fully resolved on the 2nd attempt.    2. Therapeutic Activities (CPT 87109)    Therapeutic Treatment and Modalities Summary:   Re-test of positional exam: + R paola hallpike for R torsional nystagmus, latent 2\" and duration 3\". Mild amplitude  Reviewed BPPV education. Caregiver training on CRM, though she reports not feeling comfortable performing this at home  Discussed concerns regarding memory loss      Access Code: [email or web address]  URL: [email or web address]  Date: 08/08/2024  Prepared by:     Exercises  - Sit to Stand Without Arm Support  - 1 x daily - 2 sets - 10-15 reps  - Heel Raises with Counter Support  - 1 x daily - 20 reps  - Body [place] Sway  - 1 x daily - 60 seconds hold  - Semi-Tandem Corner Balance With Eyes Closed  - 1 x daily - 60 seconds hold    Time-based treatments/modalities:    Physical Therapy Timed Treatment Charges  Therapeutic activity minutes (CPT 34332): 20 minutes    ASSESSMENT:   Response to treatment: Mild R PC-BPPV remains. Appeared to fully clear after today's visit. Should be " re-checked next week and return to general strength and balance training as able.     PLAN/RECOMMENDATIONS:   Plan for treatment: therapy treatment to continue next visit.  Planned interventions for next visit: continue with current treatment.

## 2024-08-22 ENCOUNTER — PHYSICAL THERAPY (OUTPATIENT)
Dept: PHYSICAL THERAPY | Facility: MEDICAL CENTER | Age: 79
End: 2024-08-22
Attending: STUDENT IN AN ORGANIZED HEALTH CARE EDUCATION/TRAINING PROGRAM
Payer: MEDICARE

## 2024-08-22 DIAGNOSIS — R26.89 OTHER ABNORMALITIES OF GAIT AND MOBILITY: ICD-10-CM

## 2024-08-22 PROCEDURE — 97530 THERAPEUTIC ACTIVITIES: CPT | Mod: KX

## 2024-08-22 PROCEDURE — 95992 CANALITH REPOSITIONING PROC: CPT | Mod: KX

## 2024-08-26 ENCOUNTER — PHYSICAL THERAPY (OUTPATIENT)
Dept: PHYSICAL THERAPY | Facility: MEDICAL CENTER | Age: 79
End: 2024-08-26
Attending: STUDENT IN AN ORGANIZED HEALTH CARE EDUCATION/TRAINING PROGRAM
Payer: MEDICARE

## 2024-08-26 DIAGNOSIS — R26.89 OTHER ABNORMALITIES OF GAIT AND MOBILITY: ICD-10-CM

## 2024-08-26 PROCEDURE — 95992 CANALITH REPOSITIONING PROC: CPT | Mod: KX

## 2024-08-26 PROCEDURE — 97112 NEUROMUSCULAR REEDUCATION: CPT | Mod: KX

## 2024-08-26 NOTE — OP THERAPY DAILY TREATMENT
"  Outpatient Physical Therapy  DAILY TREATMENT     [facility] Outpatient Physical Therapy  [address]  [place] [address]  Phone:  [phone]  Fax:  [phone]    Date: 08/26/2024    Patient: Taye Roca  YOB: 1945  MRN: [id number]     Time Calculation    Start time: 1100  Stop time: 1145 Time Calculation (min): 45 minutes         Chief Complaint: Vertigo and Loss Of Balance    Visit #: 7    SUBJECTIVE:  Still having some vertigo rolling to the R in bed. Recalls being treated here before, but does not recall the details around BPPV. Spouse has been helping with pxns and HEP    OBJECTIVE:    POS R paola hallpike, latent 2\", duration 8\", mild amplitude.     Therapeutic Treatments and Modalities:     1. Canalith Repositioning (CPT 00356), R epley completed x 2. Nystagmus was fully resolved on the 2nd attempt.    2. Neuromuscular Re-education (CPT 52758)    Therapeutic Treatment and Modalities Summary:   Balance training in // bars:   - Heel raises without UEs x 15  - Ankle sways: AP with EO x 2 min   - EC balance standard: 2x1 min (jerky/shaky as he nears the time limit)  - Lean-> step recovery: x 10 ea, fwd and back  - SLS attempts with step recovery x 1 min ea  - Foam: EO/EC 2x1 min ea    Access Code: [email or web address]  URL: [email or web address]  Date: 08/08/2024  Prepared by:     Exercises  - Sit to Stand Without Arm Support  - 1 x daily - 2 sets - 10-15 reps  - Heel Raises with Counter Support  - 1 x daily - 20 reps  - Body [place] Sway  - 1 x daily - 60 seconds hold  - Semi-Tandem Corner Balance With Eyes Closed  - 1 x daily - 60 seconds hold    Time-based treatments/modalities:    Physical Therapy Timed Treatment Charges  Neuromusc re-ed, balance, coor, post minutes (CPT 46329): 30 minutes    ASSESSMENT:   Response to treatment: Persistent R PC BPPV. Appears to clear fully with CRM, but returns between visits. Recommending more strict pxns. Will " attempt to sleep in the recliner tonight. Will continue to benefit from balance training as BPPV resolves.     PLAN/RECOMMENDATIONS:   Plan for treatment: therapy treatment to continue next visit.  Planned interventions for next visit: continue with current treatment.

## 2024-08-28 NOTE — OP THERAPY DAILY TREATMENT
Outpatient Physical Therapy  DAILY TREATMENT     [facility] Outpatient Physical Therapy  [address]  [place] [address]  Phone:  [phone]  Fax:  [phone]    Date: 08/29/2024    Patient: Taye Roca  YOB: 1945  MRN: [id number]     Time Calculation    Start time: 1057  Stop time: 1145 Time Calculation (min): 48 minutes         Chief Complaint: Loss Of Balance    Visit #: 8    SUBJECTIVE:  Slept in recliner the night after last visit and has not had any vertigo since last visit.     OBJECTIVE:      Therapeutic Treatments and Modalities:     1. Neuromuscular Re-education (CPT 08627)    Therapeutic Treatment and Modalities Summary:   Reassessment of positional exam: NEG for B hallpike, roll and hang    Functional Gait Analysis  1) Gait level surface: 2  2) Change in gait speed: 2  3) Gait with horizontal head turns: 1  4) Gait with vertical head turns: 2  5) Gait and pivot turn: 2  6) Step over obstacle: 2  7) Gait with narrow COLIN: 0  8) Gait with eyes closed: 1  9) Ambulating backwards: 2  10) Steps: 2    Total= 16/30    6 min walk test= 810 feet with R SPC. (5/10 RPE)    Access Code: [email or web address]  URL: [email or web address]  Date: 08/08/2024  Prepared by:     Exercises  - Sit to Stand Without Arm Support  - 1 x daily - 2 sets - 10-15 reps  - Heel Raises with Counter Support  - 1 x daily - 20 reps  - Body [place] Sway  - 1 x daily - 60 seconds hold  - Semi-Tandem Corner Balance With Eyes Closed  - 1 x daily - 60 seconds hold    Time-based treatments/modalities:    Physical Therapy Timed Treatment Charges  Neuromusc re-ed, balance, coor, post minutes (CPT 81602): 45 minutes    ASSESSMENT:   Response to treatment: See PN    PLAN/RECOMMENDATIONS:   Plan for treatment: therapy treatment to continue next visit.  Planned interventions for next visit: continue with current treatment.

## 2024-08-29 ENCOUNTER — PHYSICAL THERAPY (OUTPATIENT)
Dept: PHYSICAL THERAPY | Facility: MEDICAL CENTER | Age: 79
End: 2024-08-29
Attending: STUDENT IN AN ORGANIZED HEALTH CARE EDUCATION/TRAINING PROGRAM
Payer: MEDICARE

## 2024-08-29 DIAGNOSIS — R26.89 OTHER ABNORMALITIES OF GAIT AND MOBILITY: ICD-10-CM

## 2024-08-29 PROCEDURE — 97112 NEUROMUSCULAR REEDUCATION: CPT

## 2024-08-29 NOTE — OP THERAPY PROGRESS SUMMARY
Outpatient Physical Therapy  PROGRESS SUMMARY NOTE      [facility] Outpatient Physical Therapy  [address]  [place] [address]  Phone:  [phone]  Fax:  [phone]    Date of Visit: 08/29/2024    Patient: Taye Roca  YOB: 1945  MRN: [id number]     Referring Provider: Leticia Wilkerson M.D.  [address]  [place] [address]   Referring Diagnosis Other abnormalities of gait and mobility [R26.89]     Visit Diagnoses     ICD-10-CM   1. Other abnormalities of gait and mobility  R26.89       Rehab Potential: excellent    Progress Report Period: 7/23/24-8/29/24    Functional Assessment Used          Objective Findings and Assessment:   Patient progression towards goals:   Taye has been seen for 8 PT sessions supporting his concerns of dizziness and imbalance through VRT and balance training. He was found to be positive for R PC-BPPV. Today, was found to be clear of positional vertigo after 3 sessions to address this. Now that the vertigo is stable, we are seeing substantial progress in his balance in a short period of time. EC balance on firm is WNL and on foam shows mod sway with good effort to correct. His FGA still shows risk of falls (16/30) and need for AD (SPC) for safe ambulation. His endurance on the 6 min walk is improved by ability to complete the full test, but is below standards with regard to total distance (810 feet). Continuation of skilled PT services is recommended to ensure full resolution of BPPV, support recovery of balance and endurance to enhance functional independence.     Goals:   Short Term Goals:     - Maintains NEG for positional exam  - Is able to ambulate the community 100% of the time with a SPC  - Is compliant with neighborhood walks 3-4x/week to moderate RPE  Short term goal time span:  1-2 weeks      Long Term Goals:      - Improve 6 min walk test to 1200 feet with least restrictive device  - Improve FGA to  23/30 or better  - Pt demonstrates independence with a HEP for continued management of this problem beyond discharge from therapy.     Long term goal time span:  6-8 weeks    Plan:   Planned therapy interventions:  Gait Training (CPT 80418), Therapeutic Exercise (CPT 60487), Therapeutic Activities (CPT 67741), Canalith Repositioning (CPT 30965), Functional Training, Self Care (CPT 33571) and Neuromuscular Re-education (CPT 18913)  Frequency: 1-2x/week.  Duration in weeks:  8      Referring provider co-signature:  I have reviewed this plan of care and my co-signature certifies the need for services.     Certification Period: 08/29/2024 to 10/24/24    Physician Signature: ________________________________ Date: ______________

## 2024-09-05 ENCOUNTER — PHYSICAL THERAPY (OUTPATIENT)
Dept: PHYSICAL THERAPY | Facility: MEDICAL CENTER | Age: 79
End: 2024-09-05
Attending: STUDENT IN AN ORGANIZED HEALTH CARE EDUCATION/TRAINING PROGRAM
Payer: MEDICARE

## 2024-09-05 DIAGNOSIS — R26.89 OTHER ABNORMALITIES OF GAIT AND MOBILITY: ICD-10-CM

## 2024-09-05 PROCEDURE — 97112 NEUROMUSCULAR REEDUCATION: CPT

## 2024-09-05 PROCEDURE — 97110 THERAPEUTIC EXERCISES: CPT

## 2024-09-05 NOTE — OP THERAPY DAILY TREATMENT
"  Outpatient Physical Therapy  DAILY TREATMENT     [facility] Outpatient Physical Therapy  [address]  [place] [address]  Phone:  [phone]  Fax:  [phone]    Date: 09/05/2024    Patient: Taye Roca  YOB: 1945  MRN: [id number]     Time Calculation    Start time: 0930  Stop time: 1015 Time Calculation (min): 45 minutes         Chief Complaint: Loss Of Balance    Visit #: 9    SUBJECTIVE:  Spouse reports an event where he was walking into the kitchen and \"froze\" with hands up on the cabinet. Asked for help. Spouse got him a seat and this resolved in a couple minutes.     OBJECTIVE:      Therapeutic Exercises (CPT 90809):     1. Counter side steps, orange TB x 2x5 feet    2. Step up, 6\" x 10 ea, Early posterior lean when stepping back with the L    3. Lateral step up, 6\" x 10 ea, Poor lateral weight shift. Near fall with PT recovery for LOB to the R when stepping down to the R.    Therapeutic Treatments and Modalities:     1. Neuromuscular Re-education (CPT 62695)    Therapeutic Treatment and Modalities Summary:   //Bars:   Ankle sways: AP and circular, EO, toy soldier x 1 min ea   Blaze pods:   Color catch on the floor: in a line x1= 29 hits, 4 square= 14 hits  Plank focus: 2x (4 hits, 10 hits)      Access Code: [email or web address]  URL: [email or web address]  Date: 09/05/2024  Prepared by:     Exercises  - Sit to Stand Without Arm Support  - 1 x daily - 2 sets - 10-15 reps  - Heel Raises with Counter Support  - 1 x daily - 20 reps  - Body [place] Sway  - 1 x daily - 60 seconds hold  - Semi-Tandem Corner Balance With Eyes Closed  - 1 x daily - 60 seconds hold  - Standing Balance with Eyes Closed on Foam  - 1 x daily - 60 seconds hold  - Side Stepping with Resistance at Ankles and Counter Support  - 1 x daily - 2-3 sets - 10 reps    Time-based treatments/modalities:    Physical Therapy Timed Treatment Charges  Neuromusc re-ed, balance, coor, post " minutes (CPT 24068): 35 minutes  Therapeutic exercise minutes (CPT 66358): 10 minutes    ASSESSMENT:   Response to treatment: Continues to appear clear of BPPV. No episodes of spinning dizziness in bed. Did demonstrate poor motor planning today with lateral steps and dual tasking during blaze pods. Tends to lean R and posterior. Poor step recovery with PT support for near falls. Delayed scanning to the R. Otherwise the session was well tolerated and overall improved endurance with reduced sitting rest.     PLAN/RECOMMENDATIONS:   Plan for treatment: therapy treatment to continue next visit.  Planned interventions for next visit: continue with current treatment.

## 2024-09-12 NOTE — OP THERAPY DAILY TREATMENT
"  Outpatient Physical Therapy  DAILY TREATMENT     [facility]  [address]  [place] [address]  Phone:  [phone]  Fax:  [phone]    Date: 09/13/2024    Patient: Taye Roca  YOB: 1945  MRN: [id number]     Time Calculation    Start time: 1330  Stop time: 1415 Time Calculation (min): 45 minutes         Chief Complaint: Loss Of Balance    Visit #: 10    SUBJECTIVE:  No episodes of vertigo. Spouse reports pt having 3 days (Sat-Mon) of decreased energy and mobility. Noted increased shuffle and need for 4WW. Sudden improvement in walking the last 2 days. Does not report unusual sx otherwise.     OBJECTIVE:      Therapeutic Treatments and Modalities:     1. Neuromuscular Re-education (CPT 64392)    Therapeutic Treatment and Modalities Summary:   - STS with foam pad unilateral: 2x5 ea with SBA-CGA. EO then EC  - Marching with 4# ankle weight for augmented load: x 20 with 1 hand support (several LOB posterior. Having to be re-cued to sustain height)  - Steps (3x on clinic set, 6\", 4# augmented load). SBA, requires seated rest to follow  - Plyo box push (fwd, back) with 4# ankle weights. X 3 laps.     Access Code: [email or web address]  URL: [email or web address]  Date: 09/05/2024  Prepared by:     Exercises  - Sit to Stand Without Arm Support  - 1 x daily - 2 sets - 10-15 reps  - Heel Raises with Counter Support  - 1 x daily - 20 reps  - Body [place] Sway  - 1 x daily - 60 seconds hold  - Semi-Tandem Corner Balance With Eyes Closed  - 1 x daily - 60 seconds hold  - Standing Balance with Eyes Closed on Foam  - 1 x daily - 60 seconds hold  - Side Stepping with Resistance at Ankles and Counter Support  - 1 x daily - 2-3 sets - 10 reps    Time-based treatments/modalities:    Physical Therapy Timed Treatment Charges  Neuromusc re-ed, balance, coor, post minutes (CPT 48575): 45 minutes    ASSESSMENT:   Response to treatment: Did present " with more pronounced shuffle. Difficulty clearing the R LE. Improved after augmented loading, though may not carryover due to fluctuating nature of his condition and apparent memory impairment. Continues to require frequent seated rest, LEs quivering during balance demands and requires encouragement to participate. Did encourage him to move his appt with the neurologist forward if symptoms continue to fluctuate or fail to improve.     PLAN/RECOMMENDATIONS:   Plan for treatment: therapy treatment to continue next visit.  Planned interventions for next visit: continue with current treatment.

## 2024-09-13 ENCOUNTER — PHYSICAL THERAPY (OUTPATIENT)
Dept: PHYSICAL THERAPY | Facility: MEDICAL CENTER | Age: 79
End: 2024-09-13
Attending: STUDENT IN AN ORGANIZED HEALTH CARE EDUCATION/TRAINING PROGRAM
Payer: MEDICARE

## 2024-09-13 DIAGNOSIS — R26.89 OTHER ABNORMALITIES OF GAIT AND MOBILITY: ICD-10-CM

## 2024-09-13 PROCEDURE — 97112 NEUROMUSCULAR REEDUCATION: CPT

## 2024-09-16 ENCOUNTER — APPOINTMENT (OUTPATIENT)
Dept: RADIOLOGY | Facility: MEDICAL CENTER | Age: 79
End: 2024-09-16
Attending: EMERGENCY MEDICINE
Payer: MEDICARE

## 2024-09-16 ENCOUNTER — HOSPITAL ENCOUNTER (EMERGENCY)
Facility: MEDICAL CENTER | Age: 79
End: 2024-09-16
Attending: EMERGENCY MEDICINE
Payer: MEDICARE

## 2024-09-16 ENCOUNTER — PHYSICAL THERAPY (OUTPATIENT)
Dept: PHYSICAL THERAPY | Facility: MEDICAL CENTER | Age: 79
End: 2024-09-16
Attending: STUDENT IN AN ORGANIZED HEALTH CARE EDUCATION/TRAINING PROGRAM
Payer: MEDICARE

## 2024-09-16 VITALS
HEIGHT: 68 IN | RESPIRATION RATE: 20 BRPM | TEMPERATURE: 98.3 F | OXYGEN SATURATION: 98 % | WEIGHT: 190.26 LBS | BODY MASS INDEX: 28.83 KG/M2 | DIASTOLIC BLOOD PRESSURE: 62 MMHG | HEART RATE: 59 BPM | SYSTOLIC BLOOD PRESSURE: 129 MMHG

## 2024-09-16 DIAGNOSIS — D56.9 THALASSEMIA, UNSPECIFIED TYPE: ICD-10-CM

## 2024-09-16 DIAGNOSIS — R26.89 OTHER ABNORMALITIES OF GAIT AND MOBILITY: ICD-10-CM

## 2024-09-16 DIAGNOSIS — R55 VASOVAGAL EPISODE: ICD-10-CM

## 2024-09-16 LAB
ALBUMIN SERPL BCP-MCNC: 4.4 G/DL (ref 3.2–4.9)
ALBUMIN/GLOB SERPL: 1.8 G/DL
ALP SERPL-CCNC: 45 U/L (ref 30–99)
ALT SERPL-CCNC: 18 U/L (ref 2–50)
ANION GAP SERPL CALC-SCNC: 13 MMOL/L (ref 7–16)
ANISOCYTOSIS BLD QL SMEAR: ABNORMAL
AST SERPL-CCNC: 16 U/L (ref 12–45)
BASOPHILS # BLD AUTO: 0 % (ref 0–1.8)
BASOPHILS # BLD: 0 K/UL (ref 0–0.12)
BILIRUB SERPL-MCNC: 0.7 MG/DL (ref 0.1–1.5)
BUN SERPL-MCNC: 35 MG/DL (ref 8–22)
CALCIUM ALBUM COR SERPL-MCNC: 9.4 MG/DL (ref 8.5–10.5)
CALCIUM SERPL-MCNC: 9.7 MG/DL (ref 8.4–10.2)
CHLORIDE SERPL-SCNC: 105 MMOL/L (ref 96–112)
CO2 SERPL-SCNC: 23 MMOL/L (ref 20–33)
CREAT SERPL-MCNC: 1.64 MG/DL (ref 0.5–1.4)
EKG IMPRESSION: NORMAL
EKG IMPRESSION: NORMAL
EOSINOPHIL # BLD AUTO: 0.29 K/UL (ref 0–0.51)
EOSINOPHIL NFR BLD: 3 % (ref 0–6.9)
ERYTHROCYTE [DISTWIDTH] IN BLOOD BY AUTOMATED COUNT: 36.4 FL (ref 35.9–50)
GFR SERPLBLD CREATININE-BSD FMLA CKD-EPI: 42 ML/MIN/1.73 M 2
GLOBULIN SER CALC-MCNC: 2.5 G/DL (ref 1.9–3.5)
GLUCOSE SERPL-MCNC: 105 MG/DL (ref 65–99)
HCT VFR BLD AUTO: 38.2 % (ref 42–52)
HGB BLD-MCNC: 11.8 G/DL (ref 14–18)
LYMPHOCYTES # BLD AUTO: 3.46 K/UL (ref 1–4.8)
LYMPHOCYTES NFR BLD: 36 % (ref 22–41)
MANUAL DIFF BLD: NORMAL
MCH RBC QN AUTO: 20.2 PG (ref 27–33)
MCHC RBC AUTO-ENTMCNC: 30.9 G/DL (ref 32.3–36.5)
MCV RBC AUTO: 65.5 FL (ref 81.4–97.8)
METAMYELOCYTES NFR BLD MANUAL: 2 %
MICROCYTES BLD QL SMEAR: ABNORMAL
MONOCYTES # BLD AUTO: 0.67 K/UL (ref 0–0.85)
MONOCYTES NFR BLD AUTO: 7 % (ref 0–13.4)
NEUTROPHILS # BLD AUTO: 4.99 K/UL (ref 1.82–7.42)
NEUTROPHILS NFR BLD: 52 % (ref 44–72)
NRBC # BLD AUTO: 0 K/UL
NRBC BLD-RTO: 0 /100 WBC (ref 0–0.2)
OVALOCYTES BLD QL SMEAR: NORMAL
PLATELET # BLD AUTO: 262 K/UL (ref 164–446)
PLATELET BLD QL SMEAR: NORMAL
PMV BLD AUTO: 10.9 FL (ref 9–12.9)
POIKILOCYTOSIS BLD QL SMEAR: NORMAL
POTASSIUM SERPL-SCNC: 4.4 MMOL/L (ref 3.6–5.5)
PROT SERPL-MCNC: 6.9 G/DL (ref 6–8.2)
RBC # BLD AUTO: 5.83 M/UL (ref 4.7–6.1)
RBC BLD AUTO: PRESENT
SODIUM SERPL-SCNC: 141 MMOL/L (ref 135–145)
TROPONIN T SERPL-MCNC: 22 NG/L (ref 6–19)
TROPONIN T SERPL-MCNC: 26 NG/L (ref 6–19)
VARIANT LYMPHS BLD QL SMEAR: NORMAL
WBC # BLD AUTO: 9.6 K/UL (ref 4.8–10.8)

## 2024-09-16 PROCEDURE — 80053 COMPREHEN METABOLIC PANEL: CPT

## 2024-09-16 PROCEDURE — 93005 ELECTROCARDIOGRAM TRACING: CPT | Mod: XE | Performed by: EMERGENCY MEDICINE

## 2024-09-16 PROCEDURE — 700105 HCHG RX REV CODE 258: Performed by: EMERGENCY MEDICINE

## 2024-09-16 PROCEDURE — 99285 EMERGENCY DEPT VISIT HI MDM: CPT

## 2024-09-16 PROCEDURE — 97530 THERAPEUTIC ACTIVITIES: CPT

## 2024-09-16 PROCEDURE — 71045 X-RAY EXAM CHEST 1 VIEW: CPT

## 2024-09-16 PROCEDURE — 84484 ASSAY OF TROPONIN QUANT: CPT

## 2024-09-16 PROCEDURE — 36415 COLL VENOUS BLD VENIPUNCTURE: CPT

## 2024-09-16 PROCEDURE — 93005 ELECTROCARDIOGRAM TRACING: CPT

## 2024-09-16 PROCEDURE — 85007 BL SMEAR W/DIFF WBC COUNT: CPT

## 2024-09-16 PROCEDURE — 85027 COMPLETE CBC AUTOMATED: CPT

## 2024-09-16 RX ORDER — SODIUM CHLORIDE, SODIUM LACTATE, POTASSIUM CHLORIDE, CALCIUM CHLORIDE 600; 310; 30; 20 MG/100ML; MG/100ML; MG/100ML; MG/100ML
1000 INJECTION, SOLUTION INTRAVENOUS ONCE
Status: COMPLETED | OUTPATIENT
Start: 2024-09-16 | End: 2024-09-16

## 2024-09-16 RX ADMIN — SODIUM CHLORIDE, POTASSIUM CHLORIDE, SODIUM LACTATE AND CALCIUM CHLORIDE 1000 ML: 600; 310; 30; 20 INJECTION, SOLUTION INTRAVENOUS at 13:45

## 2024-09-16 ASSESSMENT — FIBROSIS 4 INDEX: FIB4 SCORE: 1.26

## 2024-09-16 NOTE — ED NOTES
Medication history reviewed with pts wife. Med rec is complete.  Allergies reviewed, per pts wife.  Interviewed pt with wife at bedside with permission from pt.    Pts wife had a list of medications that she read to this writer.     Patient has not had any outpatient antibiotics in the last 30 days.    Pt is on anticoagulants, pt takes ELIQUIS 5MG.  Last dose was taken on 9/16/2024 at 0900.

## 2024-09-16 NOTE — ED TRIAGE NOTES
Pt comes in w/ wife  was at PT today and during therapy pt had a sudden episode of blank stare none verbal response   did not pass out     PT tech took pt and had him lay down    took BP and noted it was low 70's    episode lasted a short time   pt does have Hx of such several months ago   is in rehab for strengthening him up after being in hospital this past April   pt w/ Hx of vertigo and balance issues   they are attempting to resolve this issues with pt per wife

## 2024-09-16 NOTE — ED PROVIDER NOTES
"CHIEF COMPLAINT  Chief Complaint   Patient presents with    Other     Was at PT and had a period of blank stare and very low BP          LIMITATION TO HISTORY   Select: none    HPI    Hardy Roca is a 78 y.o. male who presents to the Emergency Department for hypotension onset prior to arrival. The patient's significant other reports that the patient was at physical therapy and had stood up from a chair. Significant other notes that after standing up the patient had a \"blank stare and was frozen\". Patient's significant other states that the physical therapist took his blood pressure, adding that it was 87/61. Patient notes that they had him lay down, stating that he felt improved soon after laying down. Significant other reports that the physical therapist recommended that they report to the ED for further evaluation due to continued low blood pressure. The patient had associated dizziness during the incident, but denies any chest pain, shortness of breath, or chills. The patient's significant other states that he was going to physical therapy for balance and stamina after being seen earlier this year for vertigo. Significant other adds that the patient had a similar episode earlier this month where the patient was unable to speak and had a \"blank stare\".     OUTSIDE HISTORIAN(S):  Select: Patient's significant other provided additional information to the patient's history, as seen above.     EXTERNAL RECORDS REVIEWED  Select: See note from today relates that the blood pressure after sitting was 78/50 for 5 minutes.  Upon returning the was 87/61 with a pulse of 86    PAST MEDICAL HISTORY  Past Medical History:   Diagnosis Date    Atrial flutter (HCC)     BPH (benign prostatic hypertrophy)     CATARACT 2006, 2007    both done, one at a time    HLD (hyperlipidemia)     Unspecified urinary incontinence     prostate enlargement , per patient     .    SURGICAL HISTORY  Past Surgical History:   Procedure Laterality " "Date    RECOVERY  11/23/2015    Procedure: CATH LAB-CARDIOVERSION-KOHERBERTHKI-ANESTHESIA-ICD 10: I48.91;  Surgeon: Recoveryonly Surgery;  [place] [facility] [place]:     INGUINAL HERNIA REPAIR  10/28/2009    Performed by ANGELI CHOI at SURGERY [facility]    HERNIA REPAIR  2009    OTHER  1982    tonsillectomy    APPENDECTOMY      CATARACT EXTRACTION WITH IOL      bilateral         FAMILY HISTORY  Family History   Problem Relation Age of Onset    Heart Disease Neg Hx     Heart Failure Neg Hx           SOCIAL HISTORY  Social History     Tobacco Use    Smoking status: Never    Smokeless tobacco: Never   Substance Use Topics    Alcohol use: Yes     Comment: rarely    Drug use: No         CURRENT MEDICATIONS  No current facility-administered medications on file prior to encounter.     Current Outpatient Medications on File Prior to Encounter   Medication Sig Dispense Refill    amiodarone (CORDARONE) 200 MG Tab TAKE 1 TABLET BY MOUTH EVERY DAY 90 Tablet 1    escitalopram (LEXAPRO) 10 MG Tab Take 10 mg by mouth every day. Indications: Generalized Anxiety Disorder      apixaban (ELIQUIS) 5mg Tab Take 1 Tablet by mouth 2 times a day. 200 Tablet 3    Multiple Vitamins-Minerals (OCUVITE PO) Take 1 Capsule by mouth every day. Indications: Nutritional supplement      terazosin (HYTRIN) 10 MG capsule Take 10 mg by mouth every evening. Indications: Benign Enlargement of Prostate      simvastatin (ZOCOR) 20 MG Tab Take 1 Tab by mouth every evening. 30 Tab 11         ALLERGIES  No Known Allergies    PHYSICAL EXAM  VITAL SIGNS:/63   Pulse 68   Temp 36.8 °C (98.3 °F) (Temporal)   Resp 16   Ht 1.727 m (5' 8\")   Wt 86.3 kg (190 lb 4.1 oz)   SpO2 92%   BMI 28.93 kg/m²       Constitutional: Well-developed no acute distress   HENT: Normocephalic, Atraumatic, Bilateral external ears normal.  Eyes:  conjunctiva are normal.   Neck: Supple.  Nontender midline  Cardiovascular: Regular rate and rhythm " without murmurs gallops or rubs.   Thorax & Lungs: No respiratory distress. Breathing comfortably. Lungs are clear to auscultation bilaterally, there are no wheezes no rales. Chest wall is nontender.  Abdomen: Soft, non distended, non tender   Skin: Warm, Dry, No erythema,   Back: No tenderness, No CVA tenderness.  Musculoskeletal: No clubbing cyanosis or edema good range of motion   Neurologic: Alert & oriented x 3, normal sensation moving all extremities appears normal   Psychiatric: Affect normal, Judgment normal, Mood normal.     DIAGNOSTIC STUDIES / PROCEDURES    LABS  Results for orders placed or performed during the hospital encounter of 09/16/24   CBC with Differential   Result Value Ref Range    WBC 9.6 4.8 - 10.8 K/uL    RBC 5.83 4.70 - 6.10 M/uL    Hemoglobin 11.8 (L) 14.0 - 18.0 g/dL    Hematocrit 38.2 (L) 42.0 - 52.0 %    MCV 65.5 (L) 81.4 - 97.8 fL    MCH 20.2 (L) 27.0 - 33.0 pg    MCHC 30.9 (L) 32.3 - 36.5 g/dL    RDW 36.4 35.9 - 50.0 fL    Platelet Count 262 164 - 446 K/uL    MPV 10.9 9.0 - 12.9 fL    Neutrophils-Polys 52.00 44.00 - 72.00 %    Lymphocytes 36.00 22.00 - 41.00 %    Monocytes 7.00 0.00 - 13.40 %    Eosinophils 3.00 0.00 - 6.90 %    Basophils 0.00 0.00 - 1.80 %    Nucleated RBC 0.00 0.00 - 0.20 /100 WBC    Neutrophils (Absolute) 4.99 1.82 - 7.42 K/uL    Lymphs (Absolute) 3.46 1.00 - 4.80 K/uL    Monos (Absolute) 0.67 0.00 - 0.85 K/uL    Eos (Absolute) 0.29 0.00 - 0.51 K/uL    Baso (Absolute) 0.00 0.00 - 0.12 K/uL    NRBC (Absolute) 0.00 K/uL    Anisocytosis 1+     Microcytosis 1+    Complete Metabolic Panel (CMP)   Result Value Ref Range    Sodium 141 135 - 145 mmol/L    Potassium 4.4 3.6 - 5.5 mmol/L    Chloride 105 96 - 112 mmol/L    Co2 23 20 - 33 mmol/L    Anion Gap 13.0 7.0 - 16.0    Glucose 105 (H) 65 - 99 mg/dL    Bun 35 (H) 8 - 22 mg/dL    Creatinine 1.64 (H) 0.50 - 1.40 mg/dL    Calcium 9.7 8.4 - 10.2 mg/dL    Correct Calcium 9.4 8.5 - 10.5 mg/dL    AST(SGOT) 16 12 - 45 U/L     ALT(SGPT) 18 2 - 50 U/L    Alkaline Phosphatase 45 30 - 99 U/L    Total Bilirubin 0.7 0.1 - 1.5 mg/dL    Albumin 4.4 3.2 - 4.9 g/dL    Total Protein 6.9 6.0 - 8.2 g/dL    Globulin 2.5 1.9 - 3.5 g/dL    A-G Ratio 1.8 g/dL   Troponin STAT   Result Value Ref Range    Troponin T 26 (H) 6 - 19 ng/L   ESTIMATED GFR   Result Value Ref Range    GFR (CKD-EPI) 42 (A) >60 mL/min/1.73 m 2   TROPONIN   Result Value Ref Range    Troponin T 22 (H) 6 - 19 ng/L   DIFFERENTIAL MANUAL   Result Value Ref Range    Metamyelocytes 2.00 %    Manual Diff Status PERFORMED    PLATELET ESTIMATE   Result Value Ref Range    Plt Estimation Normal    MORPHOLOGY   Result Value Ref Range    RBC Morphology Present     Poikilocytosis 1+     Ovalocytes 1+     Reactive Lymphocytes Few    EKG   Result Value Ref Range    Report       [facility].    Test Date:  2024  Pt Name:    NESSA BORIS                     Department: EDS  MRN:        [id number]                      Room:  Gender:     Male                         Technician: [id number]  :        1945                   Requested By:ER TRIAGE PROTOCOL  Order #:    [id number]                    Reading MD:    Measurements  Intervals                                Axis  Rate:       60                           P:          10  WY:         210                          QRS:        -35  QRSD:       97                           T:          36  QT:         487  QTc:        487    Interpretive Statements  Sinus rhythm  Left axis deviation  Borderline prolonged QT interval  Compared to ECG 2024 13:57:14  Left-axis deviation now present  First degree AV block no longer present     EKG   Result Value Ref Range    Report       [facility] Emergency Dept.    Test Date:  2024  Pt Name:    NESSA ESCALANTE                     Department: EDS  MRN:        [id number]                      Room:       -ROOM 5  Gender:     Male                          Technician: [id number]  :        1945                   Requested By:ER TRIAGE PROTOCOL  Order #:    [id number]                    Reading MD:    Measurements  Intervals                                Axis  Rate:       55                           P:          14  LA:         213                          QRS:        -16  QRSD:       97                           T:          3  QT:         476  QTc:        456    Interpretive Statements  Sinus rhythm  Borderline prolonged LA interval  Borderline left axis deviation  Minimal ST depression, anterolateral leads  Baseline wander in lead(s) I,II,aVR  Compared to ECG 2024 12:22:20  ST (T wave) deviation now present       EKG:   I have independently interpreted this EKG as detailed above.      RADIOLOGY  I have independently interpreted the diagnostic imaging associated with this visit and am waiting the final reading from the radiologist.   My preliminary interpretation is as follows: No acute infiltrates on chest x-ray      Radiologist interpretation:   DX-CHEST-PORTABLE (1 VIEW)   Final Result         1. No acute cardiopulmonary abnormalities are identified.           COURSE & MEDICAL DECISION MAKING    ED COURSE:    ED Observation Status? No, The patient does not qualify for observation status     INTERVENTIONS BY ME:  Medications   lactated ringers (LR) bolus (0 mL Intravenous Stopped 24 1529)       1:05 PM - Patient seen and examined at bedside for hypotension onset prior to arrival. Discussed plan of care, including providing the patient with fluids as well as ordering labs to further evaluate their condition. Patient agrees to the plan of care. Ordered for DX-Chest portable, CBC w/ diff, CMP, Troponin STAT and EKG to evaluate his symptoms.     2:38 PM - Patient was reevaluated at bedside. Discussed lab results with the patient and informed them that they are anemic. Patient's significant other reports that the patient has a history of thalassemia.  Informed the patient that we are still waiting on another lab result but that they can be discharged if they wanted. Patient and his significant other are requesting to be discharged at this time. I discussed plan for discharge and follow up as outlined below. The patient is stable for discharge at this time and will return for any new or worsening symptoms. Patient verbalizes understanding and support with my plan for discharge.      INITIAL ASSESSMENT, COURSE AND PLAN  Care Narrative: Patient does have a history of thalassemia and is being followed for this.  Upon arrival the patient was actually asymptomatic normotensive.  EKG was normal laboratory studies do show a microcytic anemia and with his history of thalassemia this is the cause for that.  Electrolytes are otherwise normal.  Patient's initial troponin was slightly elevated at 2 6 repeat was 22.  The patient's creatinine is elevated at 1.6 but that is the patient's baseline creatinine.  I did give the patient a fluid bolus were slightly positive when the patient was sitting he was down to 99/53 his heart rate did react to it.  Now his blood pressure is 129/62 after fluid bolus.  So therefore the patient was not orthostatic.  At this point the patient is had an essentially negative workup.  I do feel that has a partial orthostasis as well as most likely vasovagal event that occurred today.  I do feel the patient stable for discharge no further workup is necessary.    HYDRATION: Based on the patient's presentation of dehydration,  the patient was given IV fluids. IV Hydration was used because oral hydration is unable to be done due to the patient's symptoms. Upon recheck following hydration, the patient was improved.     ADDITIONAL PROBLEM LIST  Thalassemia    DISPOSITION AND DISCUSSIONS  I have discussed management of the patient with the following physicians/ JADA's/ ancillary staff:  None    Escalation of care considered, and ultimately not performed:  None    Barriers to care at this time, including but not limited to:  None present at this time .     Decision tools and prescription drugs considered including, but not limited to: As above.    The patient will return for new or worsening symptoms and is stable at the time of discharge.    DISPOSITION:  Patient will be discharged home in stable condition.    FOLLOW UP:  Leticia Wilkerson M.D.  [address] [address]  [phone]    Schedule an appointment as soon as possible for a visit in 1 week  For re-check, Return if any symptoms worsen    FINAL DIAGNOSIS  1. Vasovagal episode    2. Thalassemia, unspecified type       I, Anali Chance (Clementinaibmatt), am scribing for, and in the presence of, Carlin Chance M.D..    Electronically signed by: Anali Chance (Jose G), 9/16/2024    ICarlin M.D. personally performed the services described in this documentation, as scribed by Anali Chance in my presence, and it is both accurate and complete.     Electronically signed by: Carlin Chance M.D.,4:18 PM 09/16/24

## 2024-09-16 NOTE — OP THERAPY DAILY TREATMENT
"  Outpatient Physical Therapy  DAILY TREATMENT     [facility]  [address]  [place] [address]  Phone:  [phone]  Fax:  [phone]    Date: 09/16/2024    Patient: Taye Roca  YOB: 1945  MRN: [id number]     Time Calculation    Start time: 1059  Stop time: 1140 Time Calculation (min): 41 minutes         Chief Complaint: Loss Of Balance    Visit #: 11    SUBJECTIVE:  Upon receiving pt in the waiting room, he reported feeling well over the weekend. No falls since last week.     OBJECTIVE:        Therapeutic Treatments and Modalities:     1. Therapeutic Activities (CPT 52244)    Therapeutic Treatment and Modalities Summary:   Attempted to initiate the session with balance work in the parallel bars; however, Taye had an event as he stepped into the bars. Appeared absent, glazed over in the eyes and unable to make eye contact. Was able to respond in yes/no to \"are you ok\" and \"do you need to sit down.\" LEs were 'frozen' mid step. ModA to get to sitting, where his responses quickly recovered. Full event lasted 1-2 mintues.     BP immediately upon returning to sitting after event= 87/61 mmHg, HR= 86 bpm, O2= 98%    BP sitting after 5 minutes= 78/50 mmHg     BP after 5 minutes of laying supine- 82/56 mmHg    BP immediately after moving in to sitting= 92/56mmHg    BP after 2 min of sitting=  82/52 mmHg    BP immediately upon standing and again after standing was sustained 2 minutes= 72/50 mmHg    ** After measures, discussed normal ranges for vitals and concerns regarding current measures. Pt was hoping to return home to call PCP for further instruction. Unfortunately, he had another freezing episode consistent with orthostatic hypotension. Ultimately agreed to be evaluated at the ED. Was escorted by wheelchair to [facility] ED.       Time-based treatments/modalities:    Physical Therapy Timed Treatment Charges  Therapeutic activity minutes " (CPT 84963): 41 minutes    ASSESSMENT:   Response to treatment: Symptomatic orthostatic hypotension results in inability to tolerate session today. Due to the profound impact on his mobility, he was at high risk of falls and ultimately decided it was appropriate to present to the ED. Monitor outcomes.     PLAN/RECOMMENDATIONS:   Plan for treatment: therapy treatment to continue next visit.  Planned interventions for next visit: continue with current treatment.

## 2024-09-16 NOTE — ED NOTES
PT and wife verbalize understanding of discharge instructions. PT ambulates to lobby with steady gate using walker from home.

## 2024-09-30 ENCOUNTER — PHYSICAL THERAPY (OUTPATIENT)
Dept: PHYSICAL THERAPY | Facility: MEDICAL CENTER | Age: 79
End: 2024-09-30
Attending: STUDENT IN AN ORGANIZED HEALTH CARE EDUCATION/TRAINING PROGRAM
Payer: MEDICARE

## 2024-09-30 DIAGNOSIS — R26.89 OTHER ABNORMALITIES OF GAIT AND MOBILITY: ICD-10-CM

## 2024-09-30 PROCEDURE — 97112 NEUROMUSCULAR REEDUCATION: CPT

## 2024-09-30 NOTE — OP THERAPY PROGRESS SUMMARY
"  Outpatient Physical Therapy  PROGRESS SUMMARY NOTE      [facility]  [address]  [place] [address]  Phone:  [phone]  Fax:  [phone]    Date of Visit: 09/30/2024    Patient: Taye Roca  YOB: 1945  MRN: [id number]     Referring Provider: Leticia Wilkerson M.D.  [address] [facility] [address]  [place] [address]   Referring Diagnosis Other abnormalities of gait and mobility [R26.89]     Visit Diagnoses     ICD-10-CM   1. Other abnormalities of gait and mobility  R26.89       Rehab Potential: good    Progress Report Period: 8/29/24-9/30/24    Functional Assessment Used          Objective Findings and Assessment:   Patient progression towards goals:   Taye has been seen for 12 PT sessions supporting his concerns of dizziness and imbalance. He was successfully treated for R PC-BPPV via the Epley maneuver. After the BPPV resolved, he had residual imbalance that required additional VRT strategies and general strengthening. Unfortunately, little progress has been made since last progress note. Taye's spouse began to report some episodes of \"freezing\" and \"zoning out\". One of these episodes was witnessed during his session on 9/16/24. He was noted to have symptoms consistent with orthostatic hypotension. Ultimately, he was unsafe to leave the clinic due to several instances of near syncope. Was evaluated by ED and has been utilizing electrolyte drinks since, which has been helpful. Functional reassessment today actually shows regression in his 6 min walk and no substantial change on the FGA. Given his vitals are now mores stable, recommending continuation of skilled PT services to support recovery of balance and endurance to enhance functional independence.     Goals:   Short Term Goals:     - Maintains NEG for positional exam  - Is able to ambulate the community 100% of the time with a SPC  - Is compliant with neighborhood walks " 3-4x/week to moderate RPE    Short term goal time span:  1-2 weeks      Long Term Goals:      - Improve 6 min walk test to 1200 feet with least restrictive device  - Improve FGA to 23/30 or better  - Pt demonstrates independence with a HEP for continued management of this problem beyond discharge from therapy.    Long term goal time span:  6-8 weeks    Plan:   Planned therapy interventions:  Canalith Repositioning (CPT 84510), Therapeutic Activities (CPT 90054), Therapeutic Exercise (CPT 60478), Functional Training, Self Care (CPT 23863) and Neuromuscular Re-education (CPT 97283)  Frequency: 1-2x/week.  Duration in weeks:  8      Referring provider co-signature:  I have reviewed this plan of care and my co-signature certifies the need for services.     Certification Period: 09/30/2024 to 11/25/24    Physician Signature: ________________________________ Date: ______________

## 2024-09-30 NOTE — OP THERAPY DAILY TREATMENT
"  Outpatient Physical Therapy  DAILY TREATMENT     [facility] Outpatient Physical Therapy  [address]  [place] [address]  Phone:  [phone]  Fax:  [phone]    Date: 09/30/2024    Patient: Taye Roca  YOB: 1945  MRN: [id number]     Time Calculation    Start time: 1056  Stop time: 1143 Time Calculation (min): 47 minutes         Chief Complaint: Loss Of Balance    Visit #: 12    SUBJECTIVE:  Was eval'd by ED after last visit. Was actually normotensive by the time he was seen, but did have a fluid bolus. Was discharged to home. Has been taking 2 liquid IV a day and monitoring BP, which has been more stable. No further \"freezing\" episodes.    OBJECTIVE:    /60 mmHg (seated at rest with manual cuff)    Therapeutic Exercises (CPT 00134):     1. NuStep, L4 x 5 min    Therapeutic Treatments and Modalities:     1. Neuromuscular Re-education (CPT 47633)    Therapeutic Treatment and Modalities Summary:   Reassessment of positional exam: NEG for B hallpike, roll and hang    Functional Gait Analysis  1) Gait level surface: 2  2) Change in gait speed: 2  3) Gait with horizontal head turns: 2  4) Gait with vertical head turns: 2  5) Gait and pivot turn: 2  6) Step over obstacle: 3  7) Gait with narrow COLIN: 0  8) Gait with eyes closed: 1  9) Ambulating backwards: 2  10) Steps: 2    Total= 18/30    6 min walk test= 723 feet with R SPC    Access Code: [email or web address]  URL: [email or web address]  Date: 08/08/2024  Prepared by:     Exercises  - Sit to Stand Without Arm Support  - 1 x daily - 2 sets - 10-15 reps  - Heel Raises with Counter Support  - 1 x daily - 20 reps  - Body [place] Sway  - 1 x daily - 60 seconds hold  - Semi-Tandem Corner Balance With Eyes Closed  - 1 x daily - 60 seconds hold    Time-based treatments/modalities:    Physical Therapy Timed Treatment Charges  Neuromusc re-ed, balance, coor, post minutes (CPT 31774): 40 " minutes  Therapeutic exercise minutes (CPT 49452): 5 minutes      ASSESSMENT:   Response to treatment: See PN     PLAN/RECOMMENDATIONS:   Plan for treatment: therapy treatment to continue next visit.  Planned interventions for next visit: continue with current treatment.

## 2024-10-03 ENCOUNTER — PHYSICAL THERAPY (OUTPATIENT)
Dept: PHYSICAL THERAPY | Facility: MEDICAL CENTER | Age: 79
End: 2024-10-03
Attending: STUDENT IN AN ORGANIZED HEALTH CARE EDUCATION/TRAINING PROGRAM
Payer: MEDICARE

## 2024-10-03 DIAGNOSIS — R26.89 OTHER ABNORMALITIES OF GAIT AND MOBILITY: ICD-10-CM

## 2024-10-03 PROCEDURE — 97110 THERAPEUTIC EXERCISES: CPT

## 2024-10-09 ENCOUNTER — PHYSICAL THERAPY (OUTPATIENT)
Dept: PHYSICAL THERAPY | Facility: MEDICAL CENTER | Age: 79
End: 2024-10-09
Attending: STUDENT IN AN ORGANIZED HEALTH CARE EDUCATION/TRAINING PROGRAM
Payer: MEDICARE

## 2024-10-09 DIAGNOSIS — R26.89 OTHER ABNORMALITIES OF GAIT AND MOBILITY: ICD-10-CM

## 2024-10-09 PROCEDURE — 97110 THERAPEUTIC EXERCISES: CPT

## 2024-10-09 PROCEDURE — 97112 NEUROMUSCULAR REEDUCATION: CPT

## 2024-10-10 ENCOUNTER — HOSPITAL ENCOUNTER (OUTPATIENT)
Dept: RADIOLOGY | Facility: MEDICAL CENTER | Age: 79
End: 2024-10-10
Attending: PSYCHIATRY & NEUROLOGY
Payer: MEDICARE

## 2024-10-10 DIAGNOSIS — Z86.79 HISTORY OF CEREBRAL ATHEROSCLEROSIS: ICD-10-CM

## 2024-10-10 DIAGNOSIS — J63.4 SUPERFICIAL SIDEROSIS PRESENT ON MAGNETIC RESONANCE IMAGING (HCC): ICD-10-CM

## 2024-10-10 PROCEDURE — 70544 MR ANGIOGRAPHY HEAD W/O DYE: CPT

## 2024-10-14 ENCOUNTER — PHYSICAL THERAPY (OUTPATIENT)
Dept: PHYSICAL THERAPY | Facility: MEDICAL CENTER | Age: 79
End: 2024-10-14
Attending: STUDENT IN AN ORGANIZED HEALTH CARE EDUCATION/TRAINING PROGRAM
Payer: MEDICARE

## 2024-10-14 DIAGNOSIS — R26.89 OTHER ABNORMALITIES OF GAIT AND MOBILITY: ICD-10-CM

## 2024-10-14 PROCEDURE — 97112 NEUROMUSCULAR REEDUCATION: CPT

## 2024-10-14 PROCEDURE — 97110 THERAPEUTIC EXERCISES: CPT

## 2024-10-21 ENCOUNTER — PHYSICAL THERAPY (OUTPATIENT)
Dept: PHYSICAL THERAPY | Facility: MEDICAL CENTER | Age: 79
End: 2024-10-21
Attending: STUDENT IN AN ORGANIZED HEALTH CARE EDUCATION/TRAINING PROGRAM
Payer: MEDICARE

## 2024-10-21 DIAGNOSIS — R26.89 OTHER ABNORMALITIES OF GAIT AND MOBILITY: ICD-10-CM

## 2024-10-21 PROCEDURE — 97112 NEUROMUSCULAR REEDUCATION: CPT

## 2024-10-23 ENCOUNTER — HOSPITAL ENCOUNTER (OUTPATIENT)
Dept: LAB | Facility: MEDICAL CENTER | Age: 79
End: 2024-10-23
Attending: STUDENT IN AN ORGANIZED HEALTH CARE EDUCATION/TRAINING PROGRAM
Payer: MEDICARE

## 2024-10-23 ENCOUNTER — TELEPHONE (OUTPATIENT)
Dept: CARDIOLOGY | Facility: MEDICAL CENTER | Age: 79
End: 2024-10-23
Payer: MEDICARE

## 2024-10-23 DIAGNOSIS — C91.10 CHRONIC LYMPHOCYTIC LEUKEMIA (HCC): ICD-10-CM

## 2024-10-23 LAB
BASO STIPL BLD QL SMEAR: NORMAL
BASOPHILS # BLD AUTO: 0 % (ref 0–1.8)
BASOPHILS # BLD: 0 K/UL (ref 0–0.12)
EOSINOPHIL # BLD AUTO: 0.1 K/UL (ref 0–0.51)
EOSINOPHIL NFR BLD: 1 % (ref 0–6.9)
ERYTHROCYTE [DISTWIDTH] IN BLOOD BY AUTOMATED COUNT: 35.9 FL (ref 35.9–50)
HCT VFR BLD AUTO: 35.5 % (ref 42–52)
HGB BLD-MCNC: 11 G/DL (ref 14–18)
LYMPHOCYTES # BLD AUTO: 7.17 K/UL (ref 1–4.8)
LYMPHOCYTES NFR BLD: 71 % (ref 22–41)
MANUAL DIFF BLD: NORMAL
MCH RBC QN AUTO: 20.2 PG (ref 27–33)
MCHC RBC AUTO-ENTMCNC: 31 G/DL (ref 32.3–36.5)
MCV RBC AUTO: 65.3 FL (ref 81.4–97.8)
MONOCYTES # BLD AUTO: 0.91 K/UL (ref 0–0.85)
MONOCYTES NFR BLD AUTO: 9 % (ref 0–13.4)
NEUTROPHILS # BLD AUTO: 1.92 K/UL (ref 1.82–7.42)
NEUTROPHILS NFR BLD: 19 % (ref 44–72)
NRBC # BLD AUTO: 0 K/UL
NRBC BLD-RTO: 0 /100 WBC (ref 0–0.2)
PLATELET # BLD AUTO: 187 K/UL (ref 164–446)
PLATELET BLD QL SMEAR: NORMAL
PMV BLD AUTO: 10.3 FL (ref 9–12.9)
POLYCHROMASIA BLD QL SMEAR: NORMAL
RBC # BLD AUTO: 5.44 M/UL (ref 4.7–6.1)
RBC BLD AUTO: PRESENT
VARIANT LYMPHS BLD QL SMEAR: NORMAL
WBC # BLD AUTO: 10.1 K/UL (ref 4.8–10.8)

## 2024-10-23 PROCEDURE — 81263 IGH VARI REGIONAL MUTATION: CPT

## 2024-10-23 PROCEDURE — 36415 COLL VENOUS BLD VENIPUNCTURE: CPT

## 2024-10-23 PROCEDURE — 85027 COMPLETE CBC AUTOMATED: CPT

## 2024-10-23 PROCEDURE — 88271 CYTOGENETICS DNA PROBE: CPT | Mod: 91

## 2024-10-23 PROCEDURE — 88275 CYTOGENETICS 100-300: CPT

## 2024-10-23 PROCEDURE — 85007 BL SMEAR W/DIFF WBC COUNT: CPT

## 2024-10-28 ENCOUNTER — PHYSICAL THERAPY (OUTPATIENT)
Dept: PHYSICAL THERAPY | Facility: MEDICAL CENTER | Age: 79
End: 2024-10-28
Attending: STUDENT IN AN ORGANIZED HEALTH CARE EDUCATION/TRAINING PROGRAM
Payer: MEDICARE

## 2024-10-28 DIAGNOSIS — R26.89 OTHER ABNORMALITIES OF GAIT AND MOBILITY: ICD-10-CM

## 2024-10-28 PROCEDURE — 97112 NEUROMUSCULAR REEDUCATION: CPT

## 2024-10-28 PROCEDURE — 97110 THERAPEUTIC EXERCISES: CPT

## 2024-10-31 LAB
CHROM ANALY INTERPHASE BLD/MAR FISH-IMP: NORMAL
PATHOLOGY STUDY: NORMAL

## 2024-11-01 NOTE — OP THERAPY DAILY TREATMENT
"  Outpatient Physical Therapy  DAILY TREATMENT     [facility] Outpatient Physical Therapy  [address]  [place] [address]  Phone:  [phone]  Fax:  [phone]    Date: 11/04/2024    Patient: Taye Roca  YOB: 1945  MRN: [id number]     Time Calculation    Start time: 1419  Stop time: 1504 Time Calculation (min): 45 minutes         Chief Complaint: Loss Of Balance    Visit #: 18    SUBJECTIVE:  Has been using his peddlar and going for walks regularly.     OBJECTIVE:      Therapeutic Exercises (CPT 18258):     1. NuStep, L4 x 5 min    2. Shuttle, Squat: 5C x 20, SL 4C 2x10    3. Gastroc stretch, slant board x 1 min    4. Heel raises, on slant board x 15, to fatigue    5. Farmer's carry, 15# unilateral x2x40 feet ea    6. clinic stairs, 2x up and over with return    Therapeutic Treatments and Modalities:     1. Neuromuscular Re-education (CPT 04775)    Therapeutic Treatment and Modalities Summary:   Blaze pods:   Shuttles: matching target with colored cone. LE to home base and UE to pod. 4x45\"   Airex: EC balance 3x30\"     Access Code: [email or web address]  URL: [email or web address]  Date: 09/05/2024  Prepared by:     Exercises  - Sit to Stand Without Arm Support  - 1 x daily - 2 sets - 10-15 reps  - Heel Raises with Counter Support  - 1 x daily - 20 reps  - Body [place] Sway  - 1 x daily - 60 seconds hold  - Semi-Tandem Corner Balance With Eyes Closed  - 1 x daily - 60 seconds hold  - Standing Balance with Eyes Closed on Foam  - 1 x daily - 60 seconds hold  - Side Stepping with Resistance at Ankles and Counter Support  - 1 x daily - 2-3 sets - 10 reps    Time-based treatments/modalities:    Physical Therapy Timed Treatment Charges  Neuromusc re-ed, balance, coor, post minutes (CPT 01219): 15 minutes  Therapeutic exercise minutes (CPT 89486): 30 minutes    ASSESSMENT:   Response to treatment: Endurance is still below norms, but improving as noted by " reduced sitting rest required during the visit and increased ability to manage external loads.     PLAN/RECOMMENDATIONS:   Plan for treatment: therapy treatment to continue next visit.  Planned interventions for next visit: continue with current treatment.

## 2024-11-02 LAB — IGVH GENE MUT ANL BLD/T: NORMAL

## 2024-11-04 ENCOUNTER — PHYSICAL THERAPY (OUTPATIENT)
Dept: PHYSICAL THERAPY | Facility: MEDICAL CENTER | Age: 79
End: 2024-11-04
Attending: STUDENT IN AN ORGANIZED HEALTH CARE EDUCATION/TRAINING PROGRAM
Payer: MEDICARE

## 2024-11-04 DIAGNOSIS — R26.89 OTHER ABNORMALITIES OF GAIT AND MOBILITY: ICD-10-CM

## 2024-11-04 PROCEDURE — 97112 NEUROMUSCULAR REEDUCATION: CPT

## 2024-11-04 PROCEDURE — 97110 THERAPEUTIC EXERCISES: CPT

## 2024-11-04 NOTE — PROGRESS NOTES
"No chief complaint on file.         Subjective:   Hardy Suarez \"Taye\" Abelino is a 78 y.o. male who presents today for follow-up.     Patient of Dr. Raymond.  The patient has PAF (flutter), OAC on apixaban, DCCV 11/23/2015, dyslipidemia, CKD, elevated PSA, microcytic anemia due to thalassemia.     Last visit 3/14/2024, he reported palpitations and a Zio patch monitor showed 5% afib burden. He was then hospitalized 4/17 - 4/24 for dizziness.  At that time he underwent Epley maneuvers which improved his symptoms.  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  Since his discharge he established with Dr. Mccann, hematology/oncology who is performing further testing and surveillance.  He was in and out of atrial flutter during the admission and was started on amiodarone.    I saw him in May, Epley maneuvers were helping with his dizziness.    He returns today at the request of his neurologist.  He recently had a brain MRI showing subacute hematoma in the anterior left frontal lobe.  His neurologist contacted me to discuss holding all blood thinners due to the concern for amyloid angiopathy.  I spoke with Taye and his wife and they have now stopped the Eliquis.    In general Taye has no issues with palpitations or recurrent arrhythmias.  He continues taking the amiodarone.  He is wondering if there is other options for rhythm control that may not require as much testing.      Past Medical History:   Diagnosis Date    Atrial flutter (HCC)     BPH (benign prostatic hypertrophy)     CATARACT 2006, 2007    both done, one at a time    HLD (hyperlipidemia)     Unspecified urinary incontinence     prostate enlargement , per patient         Family History   Problem Relation Age of Onset    Heart Disease Neg Hx     Heart Failure Neg Hx          Social History     Tobacco Use    Smoking status: Never    Smokeless tobacco: Never   Substance Use Topics    Alcohol use: Yes     Comment: rarely    Drug use: No " "        No Known Allergies      Current Outpatient Medications   Medication Sig    amiodarone (CORDARONE) 200 MG Tab TAKE 1 TABLET BY MOUTH EVERY DAY    escitalopram (LEXAPRO) 10 MG Tab Take 10 mg by mouth every day. Indications: Generalized Anxiety Disorder    Multiple Vitamins-Minerals (OCUVITE PO) Take 1 Capsule by mouth every day. Indications: Nutritional supplement    terazosin (HYTRIN) 10 MG capsule Take 10 mg by mouth every evening. Indications: Benign Enlargement of Prostate    simvastatin (ZOCOR) 20 MG Tab Take 1 Tab by mouth every evening.         Review of Systems   Respiratory:  Negative for shortness of breath.    Cardiovascular:  Negative for chest pain and palpitations.   Neurological:  Positive for dizziness (improved).          Objective:   /60 (BP Location: Left arm, Patient Position: Sitting, BP Cuff Size: Adult)   Pulse 74   Resp 17   Ht 1.727 m (5' 8\")   Wt 85.7 kg (189 lb)   SpO2 95%  Body mass index is 28.74 kg/m².         Physical Exam  Vitals reviewed.   Constitutional:       Comments: Using cane for ambulation   HENT:      Head: Normocephalic and atraumatic.   Cardiovascular:      Rate and Rhythm: Normal rate and regular rhythm.      Heart sounds: No murmur heard.  Pulmonary:      Effort: Pulmonary effort is normal. No respiratory distress.      Breath sounds: No rales.   Skin:     General: Skin is warm.   Neurological:      Mental Status: He is alert.   Psychiatric:         Judgment: Judgment normal.            Assessment:     1. Paroxysmal atrial flutter (HCC)  Comp Metabolic Panel    TSH WITH REFLEX TO FT4      2. Long term current use of amiodarone  Comp Metabolic Panel    TSH WITH REFLEX TO FT4             Medical Decision Making:  Today's Assessment / Plan:   superficial siderosis covering the left frontal hemisphere  - Neurologist contacted me to discuss very high risk of bleeding on DOAC and even antiplatelets. Taye has stopped the Eliquis. We discussed options moving " forward including no DOAC or antiplatelet, continuing amiodarone either monitoring for afib with ILR or clinically, his [id number] is 2-3 for age +/- atherosclerosis leading to about 3-4% risk of CVA/TIA per year. Another option would be placing a watchman device and continuing aspirin however this would increase his bleeding risk if he does have cerebral amyloid angiopathy. They will meet with the Neurology team next and based on their discussion we can make a decision.     PAF  -Amiodarone started in hospital in April 2024, no known AF since this. We discussed other antiarrhythmics including flecainide which requires stress test versus catheter ablation.    The risk and benefits of use of amiodarone have been explained in detail.  Risks of pulmonary toxicity, ocular toxicity, liver toxicity, neurologic toxicity, skin toxicity, thyroid toxicity have been explained in detail.  Irreversible pulmonary toxicity possibly leading to severe morbidity or death has also been explained.  The patient understands that approximately 20% of patients have to stop medications secondary to side effects.  The patient wishes to proceed with dosing of amiodarone.    TFTs and CXR done in hospital and regular 6-month and annual surveillance with LFTs, TFTs, CXR and EKG.    Establish with new cardiologist next year  Return in about 3 months (around 2/5/2025) for Dr Eid.  Sooner if problems.    I personally spent a total of 45 minutes which includes face-to-face time and non-face-to-face time spent on preparing to see the patient, reviewing hospital notes and tests, obtaining history from the patient, performing a medically appropriate exam, counseling and educating the patient, ordering medications/tests/procedures/referrals as clinically indicated, and documenting information in the electronic medical record.

## 2024-11-05 ENCOUNTER — OFFICE VISIT (OUTPATIENT)
Dept: CARDIOLOGY | Facility: MEDICAL CENTER | Age: 79
End: 2024-11-05
Attending: PHYSICIAN ASSISTANT
Payer: MEDICARE

## 2024-11-05 VITALS
HEIGHT: 68 IN | SYSTOLIC BLOOD PRESSURE: 106 MMHG | WEIGHT: 189 LBS | DIASTOLIC BLOOD PRESSURE: 60 MMHG | RESPIRATION RATE: 17 BRPM | HEART RATE: 74 BPM | OXYGEN SATURATION: 95 % | BODY MASS INDEX: 28.64 KG/M2

## 2024-11-05 DIAGNOSIS — I48.92 PAROXYSMAL ATRIAL FLUTTER (HCC): ICD-10-CM

## 2024-11-05 DIAGNOSIS — Z79.899 LONG TERM CURRENT USE OF AMIODARONE: ICD-10-CM

## 2024-11-05 PROCEDURE — 99212 OFFICE O/P EST SF 10 MIN: CPT | Performed by: PHYSICIAN ASSISTANT

## 2024-11-05 PROCEDURE — 99211 OFF/OP EST MAY X REQ PHY/QHP: CPT | Performed by: PHYSICIAN ASSISTANT

## 2024-11-05 PROCEDURE — 3078F DIAST BP <80 MM HG: CPT | Performed by: PHYSICIAN ASSISTANT

## 2024-11-05 PROCEDURE — 99215 OFFICE O/P EST HI 40 MIN: CPT | Performed by: PHYSICIAN ASSISTANT

## 2024-11-05 PROCEDURE — 3074F SYST BP LT 130 MM HG: CPT | Performed by: PHYSICIAN ASSISTANT

## 2024-11-05 ASSESSMENT — ENCOUNTER SYMPTOMS
DIZZINESS: 1
PALPITATIONS: 0
SHORTNESS OF BREATH: 0

## 2024-11-05 ASSESSMENT — FIBROSIS 4 INDEX: FIB4 SCORE: 1.59

## 2024-11-05 NOTE — LETTER
"     [facility]   [address]  [place] [address]  Phone: [phone]  Fax: [phone]              Hardy Roca  1945    Encounter Date: 11/5/2024    Aziza Larios P.A.-C.          PROGRESS NOTE:  No chief complaint on file.         Subjective:   Hardy Suarez \"Taye\" Abelino is a 78 y.o. male who presents today for follow-up.     Patient of Dr. Raymond.  The patient has PAF (flutter), OAC on apixaban, DCCV 11/23/2015, dyslipidemia, CKD, elevated PSA, microcytic anemia due to thalassemia.     Last visit 3/14/2024, he reported palpitations and a Zio patch monitor showed 5% afib burden. He was then hospitalized 4/17 - 4/24 for dizziness.  At that time he underwent Epley maneuvers which improved his symptoms.  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  Since his discharge he established with Dr. Mccann, hematology/oncology who is performing further testing and surveillance.  He was in and out of atrial flutter during the admission and was started on amiodarone.    I saw him in May, Epley maneuvers were helping with his dizziness.    He returns today at the request of his neurologist.  He recently had a brain MRI showing subacute hematoma in the anterior left frontal lobe.  His neurologist contacted me to discuss holding all blood thinners due to the concern for amyloid angiopathy.  I spoke with Taye and his wife and they have now stopped the Eliquis.    In general Taye has no issues with palpitations or recurrent arrhythmias.  He continues taking the amiodarone.  He is wondering if there is other options for rhythm control that may not require as much testing.      Past Medical History:   Diagnosis Date    Atrial flutter (HCC)     BPH (benign prostatic hypertrophy)     CATARACT 2006, 2007    both done, one at a time    HLD (hyperlipidemia)     Unspecified urinary incontinence     " "prostate enlargement , per patient         Family History   Problem Relation Age of Onset    Heart Disease Neg Hx     Heart Failure Neg Hx          Social History     Tobacco Use    Smoking status: Never    Smokeless tobacco: Never   Substance Use Topics    Alcohol use: Yes     Comment: rarely    Drug use: No         No Known Allergies      Current Outpatient Medications   Medication Sig    amiodarone (CORDARONE) 200 MG Tab TAKE 1 TABLET BY MOUTH EVERY DAY    escitalopram (LEXAPRO) 10 MG Tab Take 10 mg by mouth every day. Indications: Generalized Anxiety Disorder    Multiple Vitamins-Minerals (OCUVITE PO) Take 1 Capsule by mouth every day. Indications: Nutritional supplement    terazosin (HYTRIN) 10 MG capsule Take 10 mg by mouth every evening. Indications: Benign Enlargement of Prostate    simvastatin (ZOCOR) 20 MG Tab Take 1 Tab by mouth every evening.         Review of Systems   Respiratory:  Negative for shortness of breath.    Cardiovascular:  Negative for chest pain and palpitations.   Neurological:  Positive for dizziness (improved).          Objective:   /60 (BP Location: Left arm, Patient Position: Sitting, BP Cuff Size: Adult)   Pulse 74   Resp 17   Ht 1.727 m (5' 8\")   Wt 85.7 kg (189 lb)   SpO2 95%  Body mass index is 28.74 kg/m².         Physical Exam  Vitals reviewed.   Constitutional:       Comments: Using cane for ambulation   HENT:      Head: Normocephalic and atraumatic.   Cardiovascular:      Rate and Rhythm: Normal rate and regular rhythm.      Heart sounds: No murmur heard.  Pulmonary:      Effort: Pulmonary effort is normal. No respiratory distress.      Breath sounds: No rales.   Skin:     General: Skin is warm.   Neurological:      Mental Status: He is alert.   Psychiatric:         Judgment: Judgment normal.            Assessment:     1. Paroxysmal atrial flutter (HCC)  Comp Metabolic Panel    TSH WITH REFLEX TO FT4      2. Long term current use of amiodarone  Comp Metabolic Panel "    TSH WITH REFLEX TO FT4             Medical Decision Making:  Today's Assessment / Plan:   superficial siderosis covering the left frontal hemisphere  - Neurologist contacted me to discuss very high risk of bleeding on DOAC and even antiplatelets. Taye has stopped the Eliquis. We discussed options moving forward including no DOAC or antiplatelet, continuing amiodarone either monitoring for afib with ILR or clinically, his [id number] is 2-3 for age +/- atherosclerosis leading to about 3-4% risk of CVA/TIA per year. Another option would be placing a watchman device and continuing aspirin however this would increase his bleeding risk if he does have cerebral amyloid angiopathy. They will meet with the Neurology team next and based on their discussion we can make a decision.     PAF  -Amiodarone started in hospital in April 2024, no known AF since this. We discussed other antiarrhythmics including flecainide which requires stress test versus catheter ablation.    The risk and benefits of use of amiodarone have been explained in detail.  Risks of pulmonary toxicity, ocular toxicity, liver toxicity, neurologic toxicity, skin toxicity, thyroid toxicity have been explained in detail.  Irreversible pulmonary toxicity possibly leading to severe morbidity or death has also been explained.  The patient understands that approximately 20% of patients have to stop medications secondary to side effects.  The patient wishes to proceed with dosing of amiodarone.    TFTs and CXR done in hospital and regular 6-month and annual surveillance with LFTs, TFTs, CXR and EKG.    Establish with new cardiologist next year  Return in about 3 months (around 2/5/2025) for Dr Eid.  Sooner if problems.    I personally spent a total of 45 minutes which includes face-to-face time and non-face-to-face time spent on preparing to see the patient, reviewing hospital notes and tests, obtaining history from the patient, performing a medically  appropriate exam, counseling and educating the patient, ordering medications/tests/procedures/referrals as clinically indicated, and documenting information in the electronic medical record.        Leticia Wilkerson M.D.  [address] [place] [id number]  Via Fax: [phone]    Jadiel Leary M.D.  [address] [place] [address]  Via In Basket

## 2024-11-06 ENCOUNTER — HOSPITAL ENCOUNTER (OUTPATIENT)
Dept: HEMATOLOGY ONCOLOGY | Facility: MEDICAL CENTER | Age: 79
End: 2024-11-06
Attending: STUDENT IN AN ORGANIZED HEALTH CARE EDUCATION/TRAINING PROGRAM
Payer: MEDICARE

## 2024-11-06 VITALS
DIASTOLIC BLOOD PRESSURE: 60 MMHG | SYSTOLIC BLOOD PRESSURE: 102 MMHG | OXYGEN SATURATION: 96 % | HEIGHT: 68 IN | HEART RATE: 74 BPM | TEMPERATURE: 98.4 F | BODY MASS INDEX: 29.7 KG/M2 | WEIGHT: 196 LBS

## 2024-11-06 DIAGNOSIS — C91.10 CHRONIC LYMPHOCYTIC LEUKEMIA (HCC): ICD-10-CM

## 2024-11-06 PROCEDURE — 99212 OFFICE O/P EST SF 10 MIN: CPT | Performed by: STUDENT IN AN ORGANIZED HEALTH CARE EDUCATION/TRAINING PROGRAM

## 2024-11-06 PROCEDURE — 99213 OFFICE O/P EST LOW 20 MIN: CPT | Performed by: STUDENT IN AN ORGANIZED HEALTH CARE EDUCATION/TRAINING PROGRAM

## 2024-11-06 ASSESSMENT — ENCOUNTER SYMPTOMS
TINGLING: 0
VOMITING: 0
WEIGHT LOSS: 0
CONSTIPATION: 0
SINUS PAIN: 0
MYALGIAS: 0
DIAPHORESIS: 0
CHILLS: 0
NAUSEA: 0
INSOMNIA: 0
SHORTNESS OF BREATH: 0
SPUTUM PRODUCTION: 0
FEVER: 0
NERVOUS/ANXIOUS: 0
DIARRHEA: 0
HEARTBURN: 0
BLURRED VISION: 0
ABDOMINAL PAIN: 0
BRUISES/BLEEDS EASILY: 0
DIZZINESS: 0
BACK PAIN: 0
SORE THROAT: 0
HEADACHES: 0
ORTHOPNEA: 0
WEAKNESS: 0
PALPITATIONS: 0
WHEEZING: 0
DOUBLE VISION: 0
BLOOD IN STOOL: 0
COUGH: 0

## 2024-11-06 ASSESSMENT — FIBROSIS 4 INDEX: FIB4 SCORE: 1.59

## 2024-11-07 NOTE — PROGRESS NOTES
Follow Up Note:  Hematology/Oncology      Primary Care:  Leticia Wilkerson M.D.    Diagnosis: CLL    Chief Complaint: On-treatment visit    Current Treatment: Surveillance    Prior Treatment: NA    Oncology History of Presenting Illness:  Hardy Roca is a 78 y.o.  man who presents to the clinic for evaluation for systemic therapy for a diagnosis of CLL. He was noted to have lab abnormalities when he went to the hospital for symptoms of vertigo, with a lymphocytosis, and subsequent work up with a flow cytometry revealed CLL. He was subsequently referred for evaluation. Of note, he also has alpha thalassemia trait.     Treatment History: NA    Interval History:  Patient is here for follow up visit. He feels well and has no complaints at this time.     Allergies as of 11/06/2024    (No Known Allergies)         Current Outpatient Medications:     amiodarone (CORDARONE) 200 MG Tab, TAKE 1 TABLET BY MOUTH EVERY DAY, Disp: 90 Tablet, Rfl: 1    escitalopram (LEXAPRO) 10 MG Tab, Take 10 mg by mouth every day. Indications: Generalized Anxiety Disorder, Disp: , Rfl:     Multiple Vitamins-Minerals (OCUVITE PO), Take 1 Capsule by mouth every day. Indications: Nutritional supplement, Disp: , Rfl:     terazosin (HYTRIN) 10 MG capsule, Take 10 mg by mouth every evening. Indications: Benign Enlargement of Prostate, Disp: , Rfl:     simvastatin (ZOCOR) 20 MG Tab, Take 1 Tab by mouth every evening., Disp: 30 Tab, Rfl: 11      Review of Systems:  Review of Systems   Constitutional:  Negative for chills, diaphoresis, fever, malaise/fatigue and weight loss.   HENT:  Negative for hearing loss, nosebleeds, sinus pain and sore throat.    Eyes:  Negative for blurred vision and double vision.   Respiratory:  Negative for cough, sputum production, shortness of breath and wheezing.    Cardiovascular:  Negative for chest pain, palpitations, orthopnea and leg swelling.   Gastrointestinal:  Negative for abdominal pain, blood in stool,  "constipation, diarrhea, heartburn, melena, nausea and vomiting.   Genitourinary:  Negative for dysuria, frequency, hematuria and urgency.   Musculoskeletal:  Negative for back pain, joint pain and myalgias.   Skin:  Negative for rash.   Neurological:  Negative for dizziness, tingling, weakness and headaches.   Endo/Heme/Allergies:  Does not bruise/bleed easily.   Psychiatric/Behavioral:  The patient is not nervous/anxious and does not have insomnia.          Physical Exam:  Vitals:    11/06/24 1256   BP: 102/60   BP Location: Right arm   Patient Position: Sitting   BP Cuff Size: Adult   Pulse: 74   Temp: 36.9 °C (98.4 °F)   TempSrc: Temporal   SpO2: 96%   Weight: 88.9 kg (196 lb)   Height: 1.727 m (5' 7.99\")       DESC; KARNOFSKY SCALE WITH ECOG EQUIVALENT: 100, Fully active, able to carry on all pre-disease performed without restriction (ECOG equivalent 0)    DISTRESS LEVEL: no apparent distress    Physical Exam  Vitals and nursing note reviewed.   Constitutional:       General: He is awake. He is not in acute distress.     Appearance: Normal appearance. He is normal weight. He is not ill-appearing, toxic-appearing or diaphoretic.   HENT:      Head: Normocephalic and atraumatic.      Nose: Nose normal. No congestion.      Mouth/Throat:      Pharynx: Oropharynx is clear. No oropharyngeal exudate or posterior oropharyngeal erythema.   Eyes:      General: No scleral icterus.     Extraocular Movements: Extraocular movements intact.      Conjunctiva/sclera: Conjunctivae normal.      Pupils: Pupils are equal, round, and reactive to light.   Cardiovascular:      Rate and Rhythm: Normal rate and regular rhythm.      Pulses: Normal pulses.      Heart sounds: Normal heart sounds. No murmur heard.     No friction rub. No gallop.   Pulmonary:      Effort: Pulmonary effort is normal.      Breath sounds: Normal breath sounds. No decreased air movement. No wheezing, rhonchi or rales.   Abdominal:      General: Bowel sounds are " normal. There is no distension.      Tenderness: There is no abdominal tenderness.   Musculoskeletal:         General: No deformity. Normal range of motion.      Cervical back: Normal range of motion and neck supple. No tenderness.      Right lower leg: No edema.      Left lower leg: No edema.   Lymphadenopathy:      Cervical: No cervical adenopathy.      Upper Body:      Right upper body: No axillary adenopathy.      Left upper body: No axillary adenopathy.      Lower Body: No right inguinal adenopathy. No left inguinal adenopathy.   Skin:     General: Skin is warm and dry.      Coloration: Skin is not jaundiced.      Findings: No erythema or rash.   Neurological:      General: No focal deficit present.      Mental Status: He is alert and oriented to person, place, and time.      Sensory: Sensation is intact.      Motor: Motor function is intact. No weakness.      Gait: Gait is intact.   Psychiatric:         Attention and Perception: Attention normal.         Mood and Affect: Mood normal.         Behavior: Behavior normal. Behavior is cooperative.         Thought Content: Thought content normal.         Judgment: Judgment normal.           Labs:  No visits with results within 7 Day(s) from this visit.   Latest known visit with results is:   Hospital Outpatient Visit on 10/23/2024   Component Date Value Ref Range Status    IGHV Mutation Analysis by [facility]* 10/23/2024 See Note   Final    Comment: An immunoglobulin heavy chain clone was identified. However, an  accurate analysis of VH mutations could not be performed due to  several nucleotide positions showing multiple bases of similar  intensity. Consider submitting an additional specimen for  analysis.  This result has been reviewed and approved by Raul Raymond M.D.  INTERPRETIVE INFORMATION: IGHV Mutation Analysis by Sequencing  This test is designed to detect mutation status of the  immunoglobulin heavy chain variable region (IGHV) gene in clonal B  cell  populations. CLL patients with non-mutated IGHV genes have a  poorer clinical prognosis. In addition, cases of CLL expressing  the IGHV 3-21 variable region gene segment have a poorer prognosis  regardless of IGHV mutation status.  Methodology:  Patient RNA is isolated, reverse transcribed into cDNA, and  amplified using VH leader and JH primers. Sequencing is then  performed and results are compared against a database of all known  variable region germline sequences. The close                           st matching germline  VH gene segment and the percent of homology to it are reported.  Homologies 98% and above are designated as non-mutated and those  below 98% are designated as mutated. In addition, mutated cases  with homologies between 97% to 97.9% are also flagged as  borderline and may have an intermediate clinical course (Neisha  TJ et al., [place] Journal of Hematology 140:320-323, 2008).  Limitations:  CLL clones as low as 50% of total B cells can be analyzed by this  test. Samples that do not yield an amplification product may  either contain too few CLL cells or express VH genes with a high  number of mutations that compromised PCR primer binding.  Results of this test must always be interpreted in the context of  the patient's morphology and in conjunction with other relevant  data. Results should not be used alone for a diagnosis of  malignancy. This test is not intended to detect minimal residual  disease.  This test was developed and its performance characteristics  d                           etermined by [facility]. It has not been cleared or  approved by the US Food and Drug Administration. This test was  performed in a CLIA certified laboratory and is intended for  clinical purposes.  Performed By: [facility]  [address] [place] [id number]  : Jadiel Melissa MD, PhD  IA Number: [id number]      Chromosome FISH, CLL Panel 10/23/2024 See Note   Normal Final    Comment: Test Performed: Chromosome FISH, CLL Panel (FISH CLLP)  Specimen Type: Peripheral Blood  Indication for Testing: Chronic Lymphocytic Leukemia of B-cell Type  -----------------------------------------------------------  RESULT  Abnormal FISH Result    11q (FLORENCIO) Deletion:  not detected  Trisomy 12:  not detected  13q Deletion:  DETECTED  17p (TP53) Deletion:  not detected  -----------------------------------------------------------  INTERPRETATION  This analysis showed signal patterns consistent with deletion  13q14 involving the [id number] locus on one homolog in 87/200 (43.5  percent) cells scored, and a deletion on both homologs (biallelic)  in 15/200 (7.5 percent) cells scored.    The remaining probes showed normal results.    CLL with deletion 13q in the absence of additional abnormalities  is associated with a favorable prognosis.    Please correlate this result with clinical and other laboratory  findings.    This analysis was performed with the CLL panel probes FLORENCIO, D12Z3,  [id number]/                           LAMP1, and TP53 probes (Jarrett Lynk). A total of 200  cells were scored for each probe.    Cytogenomic Nomenclature (ISCN):  nuc beverly(FLORENCIO,TP53)x2[200],(Z64U2t6,J46V595a7-3,IPUN3n5)[102/200]  This result has been reviewed and approved by Comfort Link, PhD,  [facility]  A portion of this analysis was performed at the following  location(s):  [facility]  INTERPRETIVE INFORMATION: Chromosome Analysis, Chronic  Lymphocytic Leukemia (CLL)  Panel by FISH  This test was developed and its performance characteristics  determined by [facility]. It has not been cleared or  approved by the US Food and Drug Administration. This test was  performed in a CLIA certified laboratory and is intended for  clinical purposes.      [facility] Chromosome FISH, CLL Panel 10/23/2024 See Note   Final    Comment: Authorized individuals can access the [employer]  Enhanced Report using the following  link:    [email or web address]  Performed By: [facility]  [address] [place] [id number]  : Jadiel Melissa MD, PhD  CLIA Number: [id number]      WBC 10/23/2024 10.1  4.8 - 10.8 K/uL Final    RBC 10/23/2024 5.44  4.70 - 6.10 M/uL Final    Hemoglobin 10/23/2024 11.0 (L)  14.0 - 18.0 g/dL Final    Hematocrit 10/23/2024 35.5 (L)  42.0 - 52.0 % Final    MCV 10/23/2024 65.3 (L)  81.4 - 97.8 fL Final    MCH 10/23/2024 20.2 (L)  27.0 - 33.0 pg Final    MCHC 10/23/2024 31.0 (L)  32.3 - 36.5 g/dL Final    RDW 10/23/2024 35.9  35.9 - 50.0 fL Final    Platelet Count 10/23/2024 187  164 - 446 K/uL Final    MPV 10/23/2024 10.3  9.0 - 12.9 fL Final    Neutrophils-Polys 10/23/2024 19.00 (L)  44.00 - 72.00 % Final    Lymphocytes 10/23/2024 71.00 (H)  22.00 - 41.00 % Final    Monocytes 10/23/2024 9.00  0.00 - 13.40 % Final    Eosinophils 10/23/2024 1.00  0.00 - 6.90 % Final    Basophils 10/23/2024 0.00  0.00 - 1.80 % Final    Nucleated RBC 10/23/2024 0.00  0.00 - 0.20 /100 WBC Final    Neutrophils (Absolute) 10/23/2024 1.92  1.82 - 7.42 K/uL Final    Includes immature neutrophils, if present.    Lymphs (Absolute) 10/23/2024 7.17 (H)  1.00 - 4.80 K/uL Final    Monos (Absolute) 10/23/2024 0.91 (H)  0.00 - 0.85 K/uL Final    Eos (Absolute) 10/23/2024 0.10  0.00 - 0.51 K/uL Final    Baso (Absolute) 10/23/2024 0.00  0.00 - 0.12 K/uL Final    NRBC (Absolute) 10/23/2024 0.00  K/uL Final    Manual Diff Status 10/23/2024 PERFORMED   Final    Plt Estimation 10/23/2024 Normal   Final    RBC Morphology 10/23/2024 Present   Final    Polychromia 10/23/2024 2+   Final    Basophilic Stippling 10/23/2024 Few   Final    Reactive Lymphocytes 10/23/2024 Moderate   Final       Imaging:     All listed images below have been independently reviewed by me. I agree with the findings as summarized below:    MR-MRA HEAD-W/O    Result Date: 10/11/2024  10/10/2024 4:35 PM HISTORY/REASON FOR  EXAM:  Superficial siderosis r/o AVM, aneurysm. TECHNIQUE/EXAM DESCRIPTION: MRA of the head ([place]) without contrast. The study was performed on a Rolly 1.5 Maricarmen MRI scanner. MRA of the [place] was performed with 3D time-of-flight technique with axial acquisition. Additional MRA of the internal carotid and vertebrobasilar arteries at the level of the skull base and craniocervical junction was also performed with 3D time-of-flight technique with axial acquisition. Images are reviewed at the PACS workstation as axial source images as well as maximum intensity ray projection (MIP) multiplanar 3D reconstructions. COMPARISON:  CT angiogram of the head 4/16/2024. Brain MRI 4/17/2024 FINDINGS: The distal vertebral arteries show normal caliber and flow signal intensity. The vertebrobasilar confluence is intact. The basilar artery is patent. The posterior cerebral arteries are unremarkable. No aneurysm or occlusive lesion is seen in the posterior circulation. The anterior circulation shows the carotid siphons to be intact. The middle cerebral and anterior cerebral arteries are intact. No aneurysm or intracranial occlusive lesion is evident. There is a subacute hematoma in the left frontal lobe measuring about 3.8 x 2.3 cm which is new from the prior brain MRI. There are confluent and scattered T2 hyperintensities in cerebral white matter again noted system with chronic microvascular ischemic changes.     MRA of the [place] within normal limits with no evidence of aneurysm or cerebrovascular occlusive disease. There is a new subacute hematoma in the anterior left frontal lobe, new from brain MRI 4/17/2024 however subacute in chronicity. Initial attempt to reach ordering provider at the time of study was unsuccessful. Patient was instructed that they may go to the emergency room for further evaluation.      Pathology:  +13q deletion    Assessment & Plan:  1. Chronic lymphocytic leukemia (HCC)           This is a 78 year old  man with Carrillo stage 0 CLL, who presents for evaluation.      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 Continue with surveillance.      Treatment Plan: Surveillance     Treatment Citation: NCCN     Plan of Care:     Primary Therapy: NA  Supportive Therapy: NA  Toxicity: Patient is getting antineoplastic therapy and needs monitoring of blood counts, hepatic function, and renal function due to potential for organ dysfunction.   Labs: CBC with diff monitoring  Imaging: NA  Treatment Planning: Patient has Carrillo stage 0 CLL so can be monitored without treatment for now.   Consultations: NA  Code Status: Full  Miscellaneous: NA  Return for Follow Up: 6 months    Any questions and concerns raised by the patient were answered to the best of my ability. Thank you for allowing me to participate in the care for this patient. Please feel free to contact me for any questions or concerns.       Total time spent on chart review, clinic encounter, and documentation: 23 minutes.

## 2024-11-11 ENCOUNTER — PHYSICAL THERAPY (OUTPATIENT)
Dept: PHYSICAL THERAPY | Facility: MEDICAL CENTER | Age: 79
End: 2024-11-11
Attending: STUDENT IN AN ORGANIZED HEALTH CARE EDUCATION/TRAINING PROGRAM
Payer: MEDICARE

## 2024-11-11 ENCOUNTER — OFFICE VISIT (OUTPATIENT)
Dept: NEUROLOGY | Facility: MEDICAL CENTER | Age: 79
End: 2024-11-11
Attending: PSYCHIATRY & NEUROLOGY
Payer: MEDICARE

## 2024-11-11 VITALS
DIASTOLIC BLOOD PRESSURE: 68 MMHG | OXYGEN SATURATION: 96 % | RESPIRATION RATE: 16 BRPM | TEMPERATURE: 97.7 F | HEIGHT: 68 IN | BODY MASS INDEX: 29.74 KG/M2 | SYSTOLIC BLOOD PRESSURE: 106 MMHG | HEART RATE: 73 BPM | WEIGHT: 196.21 LBS

## 2024-11-11 DIAGNOSIS — Z86.79 H/O SPONTANEOUS INTRAPARENCHYMAL INTRACRANIAL HEMORRHAGE: ICD-10-CM

## 2024-11-11 DIAGNOSIS — R41.3 MEMORY LOSS: ICD-10-CM

## 2024-11-11 DIAGNOSIS — J63.4 SUPERFICIAL SIDEROSIS PRESENT ON MAGNETIC RESONANCE IMAGING (HCC): ICD-10-CM

## 2024-11-11 DIAGNOSIS — R26.89 OTHER ABNORMALITIES OF GAIT AND MOBILITY: ICD-10-CM

## 2024-11-11 PROCEDURE — 97110 THERAPEUTIC EXERCISES: CPT

## 2024-11-11 PROCEDURE — 3074F SYST BP LT 130 MM HG: CPT | Performed by: PSYCHIATRY & NEUROLOGY

## 2024-11-11 PROCEDURE — 99215 OFFICE O/P EST HI 40 MIN: CPT | Performed by: PSYCHIATRY & NEUROLOGY

## 2024-11-11 PROCEDURE — 97112 NEUROMUSCULAR REEDUCATION: CPT

## 2024-11-11 PROCEDURE — 99211 OFF/OP EST MAY X REQ PHY/QHP: CPT | Performed by: PSYCHIATRY & NEUROLOGY

## 2024-11-11 PROCEDURE — 3078F DIAST BP <80 MM HG: CPT | Performed by: PSYCHIATRY & NEUROLOGY

## 2024-11-11 ASSESSMENT — ENCOUNTER SYMPTOMS
FALLS: 0
TREMORS: 0
TINGLING: 0
SPEECH CHANGE: 1
FOCAL WEAKNESS: 0
MEMORY LOSS: 1

## 2024-11-11 ASSESSMENT — FIBROSIS 4 INDEX: FIB4 SCORE: 1.59

## 2024-11-11 NOTE — ASSESSMENT & PLAN NOTE
Though the original lesion is stable, I am concerned now about the spontaneous lobar hemorrhage (see below), and even though he was on anticoagulation at the time, I am also concerned about an underlying vasculopathy such as cerebral amyloid angiopathy with would predispose to hemorrhage.  Superficial siderosis is often seen in subarachnoid hemorrhage as well a sequelae of subdural hemorrhage, but the multifocal nature of what is seen for him is a little unusual for either of these possibilities.    For now they will follow-up with the cardiologist in regards to a Watchman procedure being performed.  I understand that he is no longer a candidate for anticoagulation or antiplatelet therapies at this time.  SPEP will be ordered.    Clinically, he is showing no signs of focal left-sided sensory or visual deficit, but the superficial siderosis itself could present a problem as an epileptic focus, resulting in cognitive symptoms, etc.  EEG will be ordered in this regard.    Orders:    SPEP W/REFLEX TO [facility] U[facility] G, M; Future    MR-BRAIN-W/O; Future    Referral to Neurodiagnostics (EEG,EP,EMG/NCS/DBS)

## 2024-11-11 NOTE — ASSESSMENT & PLAN NOTE
No I think being on an anticoagulant (Eliquis) increases the risk of bleeding, I am uncomfortable saying that this is the only reason.  In light of this, SPEP will be ordered to rule out the possibility of CAA, EEG to rule out seizure activity as a cause for memory problems, symptomatic of the superficial siderosis, and repeat MRI of the brain to see the evolution of the ICH itself and whether or not there may be other, asymptomatic lesions.    Orders:    SPEP W/REFLEX TO [facility] U[facility] G, M; Future    MR-BRAIN-W/O; Future    Referral to Neurodiagnostics (EEG,EP,EMG/NCS/DBS)

## 2024-11-11 NOTE — OP THERAPY DAILY TREATMENT
"  Outpatient Physical Therapy  DAILY TREATMENT     [facility] Outpatient Physical Therapy  [address]  [place] [address]  Phone:  [phone]  Fax:  [phone]    Date: 11/11/2024    Patient: Taye Roca  YOB: 1945  MRN: [id number]     Time Calculation    Start time: 1402  Stop time: 1457 Time Calculation (min): 55 minutes         Chief Complaint: Loss Of Balance    Visit #: 19    SUBJECTIVE:  Just coming from Neurology. Working dx of Amyloid Angiopathy and is consulting with Cardiology as well to reduce blood thinners.     OBJECTIVE:      Therapeutic Exercises (CPT 87009):     1. NuStep, L4 x 5 min    2. Plyo box push, 3 x 30 feet    3. Farmer's carry, 15# 3 x 40 feet    4. STS, 15# 3x5    Therapeutic Treatments and Modalities:     1. Neuromuscular Re-education (CPT 17114)    Therapeutic Treatment and Modalities Summary:   Scanning: Walking the clinic without AD, holding a full cup of water, scanning for small toy duck while answering questions.     Blaze pods:   Clap challenge with UEs only 2x30\" (improved from 50% cuing to indep)  Focus with UEs only: 3x30\" (frequent distraction and needing verbal reorientation to the appropriate color)   Clap challenge with LEs only 3x30\" (difficult, but did show improvement each effort. Completed the final attempt with 10 hits and 7 misses)      Access Code: [email or web address]  URL: [email or web address]  Date: 09/05/2024  Prepared by:     Exercises  - Sit to Stand Without Arm Support  - 1 x daily - 2 sets - 10-15 reps  - Heel Raises with Counter Support  - 1 x daily - 20 reps  - Body Zimmerman Sway  - 1 x daily - 60 seconds hold  - Semi-Tandem Corner Balance With Eyes Closed  - 1 x daily - 60 seconds hold  - Standing Balance with Eyes Closed on Foam  - 1 x daily - 60 seconds hold  - Side Stepping with Resistance at Ankles and Counter Support  - 1 x daily - 2-3 sets - 10 reps    Time-based " treatments/modalities:    Physical Therapy Timed Treatment Charges  Neuromusc re-ed, balance, coor, post minutes (CPT 39798): 35 minutes  Therapeutic exercise minutes (CPT 83586): 30 minutes    ASSESSMENT:   Response to treatment: Excellent participation. Overall, greatly improved work capacity. Cognitive dual task does impair balance during reactionary tasks, but did well with clinic ambulation. Re-assess for possible discharge next week.     PLAN/RECOMMENDATIONS:   Plan for treatment: therapy treatment to continue next visit.  Planned interventions for next visit: continue with current treatment.

## 2024-11-11 NOTE — PROGRESS NOTES
Subjective     Taye Roca is a 78 y.o. 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     HPI    Taye states that his balance difficulties even between the episodes of BPPV activation remain.  He is not falling with regularity, he does not use an assistive device such as a cane, but he is cautious.  Stride length is shortened.  They also deny any sudden episodes of focal motor or sensory distortions on the right side, language difficulties, etc.  He has had no headaches.    MRI scan of the brain from April 17, 2024, revealed mild to moderate degree of chronic atherosclerotic change, and small areas of superficial siderosis involving the right frontal as well as parietal cortices.  These are noncontiguous.  There is no underlying cortical infarct or structural pathology.  He underwent MRA of the brain on October 10, 2024, revealing the superficial siderosis, no underlying vascular anomaly in any vascular territory, but an incidental approximately 3 x 5 cm left frontal, lobar intraparenchymal hemorrhage.    I did discuss the case with Aziza Larios PA-C, from the cardiology group here.  She and I both agree that the Eliquis he had been on at the time when his hemorrhage occurred needed to be discontinued, he has had follow-up with them with discussions for a Watchman procedure.  They understand that anticoagulants and any antiplatelet agents are now contraindicated.    During this time, and even prior to this, he has been having more difficulties with memory loss.  He has found himself becoming more easily distracted and feeling overwhelmed with certain circumstances.  He gave up driving  "2 years ago simply because it was too much for him to compensate for when driving in crowded city streets.  He no longer uses the microwave because he feels overwhelmed with how to operate it, fearing he may do so inappropriately.  He does require reminders though this does not seem to be unusual.  He does not necessarily repeat himself.    They have noted a slowing of mental processing speed.  Multitasking is a little more difficult, but he does use lists with good outcome.  He uses a calendar more regularly.  Using his cell phone is more difficult for him.  He is independent with his ADLs.  There have been no real behavioral or personality changes that they have noted.  There have been no problems with hallucinations.  He sleeps well.  Appetite and weight are stable.  Bowel and bladder control are maintained.    Language is a little bit more curtailed.  He tends to lose himself mid thought, paraphasic errors are being used more commonly and it does take him a little while to remember the word he needs.  He does seem to be able to understand and keep pace in conversation.    They also describe a couple of episodes where he would suddenly stop and almost \"freeze\" mid task.  With one of them he said \"I cannot move\", Cammie came to his side, escorted him to a chair where he sat for a few minutes before he seemed to \"snap out of it\", and was able to stand and get on with his routine.  He did not complain of headache or tiredness afterwards.  There were no premonitory signs or symptoms before these events.    Medical, surgical and family histories are reviewed, there are no new drug allergies.  From my standpoint he is on no medications, but is on Cordarone, Lexapro, Zocor, Hytrin and a multivitamin.      Review of Systems   Musculoskeletal:  Negative for falls.   Neurological:  Positive for speech change. Negative for tingling, tremors and focal weakness.   Psychiatric/Behavioral:  Positive for memory loss.    All " "other systems reviewed and are negative.    Objective     /68 (BP Location: Right arm, Patient Position: Sitting, BP Cuff Size: Adult)   Pulse 73   Temp 36.5 °C (97.7 °F) (Temporal)   Resp 16   Ht 1.727 m (5' 8\")   Wt 89 kg (196 lb 3.4 oz)   SpO2 96%   BMI 29.83 kg/m²      Physical Exam    He appears in no acute distress.  He is clean and appropriately dressed, he is quite cooperative.  He easily addresses the examiner, though it is noted that he tends to look at his wife and son when questions are asked of him, of this before he answers the questions himself. Vital signs are stable.  There is no malar rash or sialorrhea.  The neck is supple, range of motion is full.  Cardiac evaluation reveals an irregular rhythm.     Neurological Exam    He is partially oriented, his mental processing speed is quite slowed.  He knows the month and day of the week but has difficulty with the date and year.  He knows we are in the hospital but requires cueing as to which hospital and the city where we are located.  He names and repeats, paraphasic errors are not used.  He has difficulty with multistep commands, and there is a clear issue with right/left confusion when crossing the midline, though there is no real inattention.  He is perseverative with some of the testing.  There is no apraxia.    PERRLA/EOMI, visual fields are full to finger counting on confrontation bilaterally.  Facial movements are symmetric, sensory exam is intact to temperature.  The tongue and uvula are midline, shoulder shrug and head rotation are symmetric.  Jaw movements are intact.    Musculoskeletal exam reveals normal tone without tremor, asterixis, or drift.  Strength is intact throughout.  Reflex exam was deferred.    Gait is unsteady, he walks with a somewhat apractic quality, stride length is shortened.  Tandem walking is an impossibility.  Armswing is symmetric.  There is no appendicular dystaxia.  Fine motor control is intact with all " 4 extremities.    Sensory exam is intact to temperature.    Assessment & Plan     Assessment & Plan  Superficial siderosis present on magnetic resonance imaging (HCC)  Though the original lesion is stable, I am concerned now about the spontaneous lobar hemorrhage (see below), and even though he was on anticoagulation at the time, I am also concerned about an underlying vasculopathy such as cerebral amyloid angiopathy with would predispose to hemorrhage.  Superficial siderosis is often seen in subarachnoid hemorrhage as well a sequelae of subdural hemorrhage, but the multifocal nature of what is seen for him is a little unusual for either of these possibilities.    For now they will follow-up with the cardiologist in regards to a Watchman procedure being performed.  I understand that he is no longer a candidate for anticoagulation or antiplatelet therapies at this time.  SPEP will be ordered.    Clinically, he is showing no signs of focal left-sided sensory or visual deficit, but the superficial siderosis itself could present a problem as an epileptic focus, resulting in cognitive symptoms, etc.  EEG will be ordered in this regard.    Orders:    SPEP W/REFLEX TO ERNA, A, G, M; Future    MR-BRAIN-W/O; Future    Referral to Neurodiagnostics (EEG,EP,EMG/NCS/DBS)    H/O spontaneous intraparenchymal intracranial hemorrhage  No I think being on an anticoagulant (Eliquis) increases the risk of bleeding, I am uncomfortable saying that this is the only reason.  In light of this, SPEP will be ordered to rule out the possibility of CAA, EEG to rule out seizure activity as a cause for memory problems, symptomatic of the superficial siderosis, and repeat MRI of the brain to see the evolution of the ICH itself and whether or not there may be other, asymptomatic lesions.    Orders:    SPEP W/REFLEX TO ERNA, A, G, M; Future    MR-BRAIN-W/O; Future    Referral to Neurodiagnostics (EEG,EP,EMG/NCS/DBS)    Memory loss  This could be part  of CAA, he has a mild degree of atherosclerotic change seen on imaging, but I doubt that this is playing a major role.  Based on his previous MRI imaging, structurally, NPH, etc. are unlikely possibilities.  EEG will be ordered, MRI imaging of the brain will be reviewed.  More in-depth cognitive assessment will be done with his return to office visit in the near future.  We will contact him with results of the MRI scan if necessary.    Orders:    Referral to Neurodiagnostics (EEG,EP,EMG/NCS/DBS)    Time: 40 minutes in total spent on patient care including pre-charting, record review, discussion with healthcare staff and documentation.  This includes face-to-face time for exam, review, discussion, as well as counseling and coordinating care.

## 2024-11-13 ENCOUNTER — TELEPHONE (OUTPATIENT)
Dept: CARDIOLOGY | Facility: MEDICAL CENTER | Age: 79
End: 2024-11-13
Payer: MEDICARE

## 2024-11-13 DIAGNOSIS — R00.2 PALPITATIONS: ICD-10-CM

## 2024-11-13 DIAGNOSIS — I48.92 PAROXYSMAL ATRIAL FLUTTER (HCC): ICD-10-CM

## 2024-11-13 NOTE — TELEPHONE ENCOUNTER
QUENTIN        Caller: Joanna(spouse)      Topic/issue: Patient's wife was asking for a call back regarding the visit on 11/5 and the visit with the neuro doctor and she was asking for a call back. Please advise      Callback Number: [phone]      Thank you    -Renan LOZA

## 2024-11-13 NOTE — TELEPHONE ENCOUNTER
Phone number called: [phone]     Call outcome: Spoke with patient's wife and she followed up with neurology and noted that he states no aspirin and is asking if there is any way to move forward with the watchman without that.     To JA: They are requesting your review of Dr. Leary's note and to advise on the ability to get a watchman placed or not without aspirin. Thank you!

## 2024-11-14 NOTE — TELEPHONE ENCOUNTER
Aziza Larios P.A.-C.  You22 hours ago (3:02 PM)     From my understanding he needs to be on aspirin with the Watchman but he is welcome to meet with the EP team to discuss his very specific case. I can place a referral if they wish.     Phone number called: [phone]     Call outcome: Spoke with Joanna and they are wanting to move forward with the referral. Referral placed for watchman consult. All questions/concerns answered at this time, patient appreciative of information given.    To schedulers: EP referral placed but please see if we can schedule patient for watchman consult with either AK or SS. Thank you!

## 2024-11-15 ENCOUNTER — APPOINTMENT (OUTPATIENT)
Dept: CARDIOLOGY | Facility: MEDICAL CENTER | Age: 79
End: 2024-11-15
Attending: INTERNAL MEDICINE
Payer: MEDICARE

## 2024-11-15 NOTE — OP THERAPY DAILY TREATMENT
Outpatient Physical Therapy  DAILY TREATMENT     [facility] Outpatient Physical Therapy  [address]  [place] [address]  Phone:  [phone]  Fax:  [phone]    Date: 11/18/2024    Patient: Taye Roca  YOB: 1945  MRN: [id number]     Time Calculation    Start time: 1413  Stop time: 1501 Time Calculation (min): 48 minutes         Chief Complaint: Loss Of Balance and Weakness    Visit #: 20    SUBJECTIVE:  No new complaints. Scheduled for EEG and is having further discussions about a watchman device.     OBJECTIVE:      Therapeutic Treatments and Modalities:     1. Neuromuscular Re-education (CPT 17940)    Therapeutic Treatment and Modalities Summary:     Functional Gait Analysis  1) Gait level surface: 3  2) Change in gait speed: 3  3) Gait with horizontal head turns: 3  4) Gait with vertical head turns: 3  5) Gait and pivot turn: 3  6) Step over obstacle: 3  7) Gait with narrow COLIN: 2  8) Gait with eyes closed: 2  9) Ambulating backwards: 2  10) Steps: 2 (poor eccentric control)     Total= 26/30     6 min walk test= 953 feet  with SPC    Stair navigation x 5 (SBA, up down without railing. Focusing on eccentric control to lower)    Access Code: [email or web address]  URL: [email or web address]  Date: 08/08/2024  Prepared by:     Exercises  - Sit to Stand Without Arm Support  - 1 x daily - 2 sets - 10-15 reps  - Heel Raises with Counter Support  - 1 x daily - 20 reps  - Body [place] Sway  - 1 x daily - 60 seconds hold  - Semi-Tandem Corner Balance With Eyes Closed  - 1 x daily - 60 seconds hold    Time-based treatments/modalities:    Physical Therapy Timed Treatment Charges  Neuromusc re-ed, balance, coor, post minutes (CPT 58449): 40 minutes    ASSESSMENT:   Response to treatment: See discharge note.     PLAN/RECOMMENDATIONS:   Plan for treatment: therapy treatment to continue next visit.  Planned interventions for next visit: continue with current  treatment.

## 2024-11-18 ENCOUNTER — PHYSICAL THERAPY (OUTPATIENT)
Dept: PHYSICAL THERAPY | Facility: MEDICAL CENTER | Age: 79
End: 2024-11-18
Attending: STUDENT IN AN ORGANIZED HEALTH CARE EDUCATION/TRAINING PROGRAM
Payer: MEDICARE

## 2024-11-18 DIAGNOSIS — R26.89 OTHER ABNORMALITIES OF GAIT AND MOBILITY: ICD-10-CM

## 2024-11-18 PROCEDURE — 97112 NEUROMUSCULAR REEDUCATION: CPT

## 2024-11-18 NOTE — OP THERAPY DISCHARGE SUMMARY
Outpatient Physical Therapy  DISCHARGE SUMMARY NOTE      [facility] Outpatient Physical Therapy  [address]  [place] [address]  Phone:  [phone]  Fax:  [phone]    Date of Visit: 11/18/2024    Patient: Taye Roca  YOB: 1945  MRN: [id number]     Referring Provider: Leticia Wilkerson M.D.  [address]  [place] [address]   Referring Diagnosis Other abnormalities of gait and mobility [R26.89]         Functional Assessment Used        Your patient is being discharged from Physical Therapy with the following comments:   Goals met    Comments:  Mr Roca was seen for 20 PT sessions supporting the management of his dizziness, weakness and instability. Treatment initially focused on clearing his BPPV; successful. Then transitioned to progressive strength and balance training. Persistent low BP with orthostatic hypotension and memory impairment did offer a barrier, though he has made substantial progress in the last 6 weeks. Taye shows good gait endurance on the 6MWT (953 feet) and low risk of falls (FGA 26/30). His dizziness continues to be clear. He is indep with his HEP with spouse support for general strength and conditioning. He is also is active with neurology and cardiology to gain better symptom control through medical management.     Recommendations:  With all goals met, pt is now appropriate for discharge. Welcome to return with a new referral if there is change in status.     Robyn Lopez, PT, DPT    Date: 11/18/2024

## 2024-11-25 ENCOUNTER — APPOINTMENT (OUTPATIENT)
Dept: PHYSICAL THERAPY | Facility: MEDICAL CENTER | Age: 79
End: 2024-11-25
Attending: STUDENT IN AN ORGANIZED HEALTH CARE EDUCATION/TRAINING PROGRAM
Payer: MEDICARE

## 2024-11-27 ENCOUNTER — NON-PROVIDER VISIT (OUTPATIENT)
Dept: NEUROLOGY | Facility: MEDICAL CENTER | Age: 79
End: 2024-11-27
Attending: STUDENT IN AN ORGANIZED HEALTH CARE EDUCATION/TRAINING PROGRAM
Payer: MEDICARE

## 2024-11-27 DIAGNOSIS — Z86.79 H/O SPONTANEOUS INTRAPARENCHYMAL INTRACRANIAL HEMORRHAGE: ICD-10-CM

## 2024-11-27 DIAGNOSIS — R41.3 MEMORY LOSS: ICD-10-CM

## 2024-11-27 DIAGNOSIS — J63.4 SUPERFICIAL SIDEROSIS PRESENT ON MAGNETIC RESONANCE IMAGING (HCC): ICD-10-CM

## 2024-11-27 PROCEDURE — 95819 EEG AWAKE AND ASLEEP: CPT | Performed by: STUDENT IN AN ORGANIZED HEALTH CARE EDUCATION/TRAINING PROGRAM

## 2024-11-27 PROCEDURE — 95819 EEG AWAKE AND ASLEEP: CPT | Mod: 26 | Performed by: STUDENT IN AN ORGANIZED HEALTH CARE EDUCATION/TRAINING PROGRAM

## 2024-11-27 NOTE — PROCEDURES
(No note.)   Hz and did not elicited any abnormalities on EEG.     EKG: Sampling of the EKG recording showed sinus rhythm    EVENTS:  None    INTERPRETATION:   Normal video EEG recording in the awake, drowsy, and sleep state(s):  - No regional slowing or persistent focal asymmetries were seen.  - No epileptiform discharges or other epileptiform phenomena seen.  - No seizures.       Note: This EEG does not rule out the possibility of seizures or exclude a diagnosis of epilepsy.  If the clinical suspicion remains high for seizures, a prolonged video EEG recording to capture clinical or subclinical events may be helpful.    Luisa Velazquez MD  Department of Neurology at [facility]  General Neurologist and Epileptologist   of Clinical Neurology, [employer] [employer].

## 2024-12-11 ENCOUNTER — OFFICE VISIT (OUTPATIENT)
Dept: CARDIOLOGY | Facility: MEDICAL CENTER | Age: 79
End: 2024-12-11
Attending: INTERNAL MEDICINE
Payer: MEDICARE

## 2024-12-11 VITALS
BODY MASS INDEX: 30.45 KG/M2 | SYSTOLIC BLOOD PRESSURE: 102 MMHG | OXYGEN SATURATION: 94 % | WEIGHT: 194 LBS | HEIGHT: 67 IN | DIASTOLIC BLOOD PRESSURE: 64 MMHG | HEART RATE: 76 BPM

## 2024-12-11 DIAGNOSIS — I48.0 PAROXYSMAL ATRIAL FIBRILLATION (HCC): ICD-10-CM

## 2024-12-11 DIAGNOSIS — I48.92 ATRIAL FLUTTER, UNSPECIFIED TYPE (HCC): ICD-10-CM

## 2024-12-11 PROCEDURE — 3074F SYST BP LT 130 MM HG: CPT | Performed by: INTERNAL MEDICINE

## 2024-12-11 PROCEDURE — 99211 OFF/OP EST MAY X REQ PHY/QHP: CPT | Performed by: INTERNAL MEDICINE

## 2024-12-11 PROCEDURE — 3078F DIAST BP <80 MM HG: CPT | Performed by: INTERNAL MEDICINE

## 2024-12-11 PROCEDURE — 99204 OFFICE O/P NEW MOD 45 MIN: CPT | Performed by: INTERNAL MEDICINE

## 2024-12-11 ASSESSMENT — FIBROSIS 4 INDEX: FIB4 SCORE: 1.61

## 2024-12-12 ENCOUNTER — TELEPHONE (OUTPATIENT)
Dept: CARDIOLOGY | Facility: MEDICAL CENTER | Age: 79
End: 2024-12-12
Payer: MEDICARE

## 2024-12-12 NOTE — TELEPHONE ENCOUNTER
Spoke with Joanna - patient scheduled for watchman w/SAMARA on 1-16-25 with Dr. Yoder. Patient has been instructed to check in at 5:30 for 7:30 case time. Message sent to authorizations. Watchman rep is aware of this date.

## 2024-12-12 NOTE — TELEPHONE ENCOUNTER
Patient scheduled foe SAMARA w/anesthesia on 3-3-25 with Dr. Yoder. Patient has been instructed to check in at 11:00 for 1:00 case time. Message sent to ophelia NINO to Dr. Yoder

## 2024-12-12 NOTE — TELEPHONE ENCOUNTER
AK    Caller: Darlin    Topic/issue: Darlin is calling from [facility] to get clarification on the recent orders that have been put in.  Please advise.    Callback Number: [phone]    Thank you,  Leigh GAFFNEY

## 2024-12-12 NOTE — TELEPHONE ENCOUNTER
----- Message from Physician Jordan Yoder M.D. sent at 12/11/2024  4:38 PM PST -----  Can you let the patient know, I did discuss with neurology, Dr. Leary is okayed to use Plavix with caution temporarily, we will proceed with watchman.  We can schedule him in January spot

## 2024-12-12 NOTE — PROGRESS NOTES
"    Interventional cardiology Initial Consultation Note      Chief Complaint: Atrial fibrillation/flutter, high bleeding risk, CLL    Hardy Roca is a 79 y.o. male  patient presented today for consultation regarding left atrial appendage occlusion with Watchman device.  He has persistent atrial flutter/fib, prior cardioversion, event monitor from March 2024 showed 5% atrial fibrillation burden.  He does have superficial siderosis, history of spontaneous intraparenchymal hemorrhage, high risk for recurrent bleeding, per neurology he is not a candidate for anticoagulation, antiplatelet agents.        Past Medical History:   Diagnosis Date    Atrial flutter (HCC)     BPH (benign prostatic hypertrophy)     CATARACT 2006, 2007    both done, one at a time    HLD (hyperlipidemia)     Unspecified urinary incontinence     prostate enlargement , per patient             Current Outpatient Medications   Medication Sig Dispense Refill    amiodarone (CORDARONE) 200 MG Tab TAKE 1 TABLET BY MOUTH EVERY DAY 90 Tablet 1    escitalopram (LEXAPRO) 10 MG Tab Take 10 mg by mouth every day. Indications: Generalized Anxiety Disorder      Multiple Vitamins-Minerals (OCUVITE PO) Take 1 Capsule by mouth every day. Indications: Nutritional supplement      terazosin (HYTRIN) 10 MG capsule Take 10 mg by mouth every evening. Indications: Benign Enlargement of Prostate      simvastatin (ZOCOR) 20 MG Tab Take 1 Tab by mouth every evening. 30 Tab 11     No current facility-administered medications for this visit.             Physical Exam:  Ambulatory Vitals  /64 (BP Location: Left arm, Patient Position: Sitting, BP Cuff Size: Adult)   Pulse 76   Ht 1.702 m (5' 7\")   Wt 88 kg (194 lb)   SpO2 94%    Oxygen Therapy:  Pulse Oximetry: 94 %  BP Readings from Last 4 Encounters:   12/11/24 102/64   11/11/24 106/68   11/06/24 102/60   11/05/24 106/60       Weight/BMI: Body mass index is 30.38 kg/m².  Wt Readings from Last 4 Encounters: "   24 88 kg (194 lb)   24 89 kg (196 lb 3.4 oz)   24 88.9 kg (196 lb)   24 85.7 kg (189 lb)           General: Well appearing and in no apparent distress  Neck: carotid bruits absent  Lungs: respiratory sounds  normal  Heart: Regular rhythm,  No palpable thrills on palpation, murmurs absent, no rubs,   Lower extremity edema absent.       Echocardiogram 2024 reviewed, independently interpreted  CONCLUSIONS  Compared to the images of the prior study 2019 - the estimate of   right ventricular systolic pressure is increased, previously normal.  Normal left ventricular systolic function.  The ejection fraction is measured to be 72 % by Cuenca's biplane.  Normal diastolic function.  The right ventricle is normal in size and systolic function.  Mild aortic insufficiency.  Trace tricuspid regurgitation.  Right ventricular systolic pressure is estimated to be 40 mmHg.         Medical Decision Makin-year-old male patient with persistent atrial fibs/flutter, prior cardioversion, CLL, dyslipidemia, superficial siderosis, prior intraparenchymal hemorrhage here for consultation regarding Watchman procedure.    His CHADS2 Vasc score is 3, has bled score is 3, he is very high risk for intracranial hemorrhage according to neurology.  Discussed with Dr. Leary neurology, we will proceed with Watchman procedure, placed him on Plavix monotherapy short-term, understanding there is some risk of bleeding.    Risk benefits of the procedure discussed with him, family    No follow-ups on file.    This note was dictated using Dragon speech recognition software.    Jordan SHI  Interventional cardiologist  [facility], [facility]  [address] [address]  Phone: [phone]  Fax: [phone]

## 2024-12-12 NOTE — TELEPHONE ENCOUNTER
Called and spoke with Joanna - she is at the store right now, per her request I will call her back later today to schedule

## 2024-12-13 NOTE — TELEPHONE ENCOUNTER
Phone number called: [phone]     Call outcome: Voicemail reached. Message left with number provided to return call.

## 2024-12-16 NOTE — TELEPHONE ENCOUNTER
Phone number called: [phone]      Call outcome: Voicemail reached. Message left with number provided to return call.

## 2024-12-20 ENCOUNTER — APPOINTMENT (OUTPATIENT)
Dept: LAB | Facility: MEDICAL CENTER | Age: 79
End: 2024-12-20
Attending: PHYSICIAN ASSISTANT
Payer: MEDICARE

## 2024-12-20 LAB
25(OH)D3 SERPL-MCNC: 56 NG/ML (ref 30–100)
ALBUMIN SERPL BCP-MCNC: 4.2 G/DL (ref 3.2–4.9)
ALBUMIN/GLOB SERPL: 1.7 G/DL
ALP SERPL-CCNC: 35 U/L (ref 30–99)
ALT SERPL-CCNC: 29 U/L (ref 2–50)
ANION GAP SERPL CALC-SCNC: 8 MMOL/L (ref 7–16)
ANISOCYTOSIS BLD QL SMEAR: ABNORMAL
APPEARANCE UR: CLEAR
AST SERPL-CCNC: 23 U/L (ref 12–45)
BACTERIA #/AREA URNS HPF: ABNORMAL /HPF
BASOPHILS # BLD AUTO: 1 % (ref 0–1.8)
BASOPHILS # BLD: 0.09 K/UL (ref 0–0.12)
BILIRUB SERPL-MCNC: 0.8 MG/DL (ref 0.1–1.5)
BILIRUB UR QL STRIP.AUTO: NEGATIVE
BUN SERPL-MCNC: 29 MG/DL (ref 8–22)
CALCIUM ALBUM COR SERPL-MCNC: 9.2 MG/DL (ref 8.5–10.5)
CALCIUM SERPL-MCNC: 9.4 MG/DL (ref 8.4–10.2)
CASTS URNS QL MICRO: ABNORMAL /LPF (ref 0–2)
CHLORIDE SERPL-SCNC: 107 MMOL/L (ref 96–112)
CHOLEST SERPL-MCNC: 125 MG/DL (ref 100–199)
CO2 SERPL-SCNC: 24 MMOL/L (ref 20–33)
COLOR UR: YELLOW
COMMENT NL1176: NORMAL
CREAT SERPL-MCNC: 1.55 MG/DL (ref 0.5–1.4)
CREAT UR-MCNC: 133.17 MG/DL
EOSINOPHIL # BLD AUTO: 0.09 K/UL (ref 0–0.51)
EOSINOPHIL NFR BLD: 1 % (ref 0–6.9)
EPITHELIAL CELLS 1715: ABNORMAL /HPF (ref 0–5)
ERYTHROCYTE [DISTWIDTH] IN BLOOD BY AUTOMATED COUNT: 36.7 FL (ref 35.9–50)
EST. AVERAGE GLUCOSE BLD GHB EST-MCNC: 94 MG/DL
FASTING STATUS PATIENT QL REPORTED: NORMAL
GFR SERPLBLD CREATININE-BSD FMLA CKD-EPI: 45 ML/MIN/1.73 M 2
GLOBULIN SER CALC-MCNC: 2.5 G/DL (ref 1.9–3.5)
GLUCOSE SERPL-MCNC: 92 MG/DL (ref 65–99)
GLUCOSE UR STRIP.AUTO-MCNC: NEGATIVE MG/DL
HBA1C MFR BLD: 4.9 % (ref 4–5.6)
HCT VFR BLD AUTO: 36.6 % (ref 42–52)
HDLC SERPL-MCNC: 32 MG/DL
HGB BLD-MCNC: 11.4 G/DL (ref 14–18)
HYALINE CAST   1831: PRESENT /LPF
KETONES UR STRIP.AUTO-MCNC: NEGATIVE MG/DL
LDLC SERPL CALC-MCNC: 72 MG/DL
LEUKOCYTE ESTERASE UR QL STRIP.AUTO: ABNORMAL
LYMPHOCYTES # BLD AUTO: 4.79 K/UL (ref 1–4.8)
LYMPHOCYTES NFR BLD: 51 % (ref 22–41)
MANUAL DIFF BLD: NORMAL
MCH RBC QN AUTO: 20.2 PG (ref 27–33)
MCHC RBC AUTO-ENTMCNC: 31.1 G/DL (ref 32.3–36.5)
MCV RBC AUTO: 64.9 FL (ref 81.4–97.8)
MICRO URNS: ABNORMAL
MICROALBUMIN UR-MCNC: <1.2 MG/DL
MICROALBUMIN/CREAT UR: NORMAL MG/G (ref 0–30)
MICROCYTES BLD QL SMEAR: ABNORMAL
MONOCYTES # BLD AUTO: 0.66 K/UL (ref 0–0.85)
MONOCYTES NFR BLD AUTO: 7 % (ref 0–13.4)
NEUTROPHILS # BLD AUTO: 3.67 K/UL (ref 1.82–7.42)
NEUTROPHILS NFR BLD: 39 % (ref 44–72)
NITRITE UR QL STRIP.AUTO: NEGATIVE
NRBC # BLD AUTO: 0 K/UL
NRBC BLD-RTO: 0 /100 WBC (ref 0–0.2)
OVALOCYTES BLD QL SMEAR: NORMAL
PH UR STRIP.AUTO: 7.5 [PH] (ref 5–8)
PLATELET # BLD AUTO: 187 K/UL (ref 164–446)
PLATELET BLD QL SMEAR: NORMAL
PMV BLD AUTO: 10.6 FL (ref 9–12.9)
POIKILOCYTOSIS BLD QL SMEAR: NORMAL
POLYCHROMASIA BLD QL SMEAR: NORMAL
POTASSIUM SERPL-SCNC: 4.4 MMOL/L (ref 3.6–5.5)
PROT SERPL-MCNC: 6.7 G/DL (ref 6–8.2)
PROT UR QL STRIP: NEGATIVE MG/DL
PSA SERPL-MCNC: 7.37 NG/ML (ref 0–4)
RBC # BLD AUTO: 5.64 M/UL (ref 4.7–6.1)
RBC # URNS HPF: ABNORMAL /HPF
RBC BLD AUTO: PRESENT
RBC UR QL AUTO: NEGATIVE
SMUDGE CELLS BLD QL SMEAR: NORMAL
SODIUM SERPL-SCNC: 139 MMOL/L (ref 135–145)
SP GR UR STRIP.AUTO: 1.02
TRIGL SERPL-MCNC: 103 MG/DL (ref 0–149)
TSH SERPL DL<=0.005 MIU/L-ACNC: 4.1 UIU/ML (ref 0.38–5.33)
VARIANT LYMPHS BLD QL SMEAR: NORMAL
WBC # BLD AUTO: 9.4 K/UL (ref 4.8–10.8)
WBC #/AREA URNS HPF: ABNORMAL /HPF
WBC OTHER NFR BLD MANUAL: 1 %

## 2024-12-20 PROCEDURE — 85027 COMPLETE CBC AUTOMATED: CPT

## 2024-12-20 PROCEDURE — 80061 LIPID PANEL: CPT

## 2024-12-20 PROCEDURE — 84153 ASSAY OF PSA TOTAL: CPT

## 2024-12-20 PROCEDURE — 82570 ASSAY OF URINE CREATININE: CPT

## 2024-12-20 PROCEDURE — 83036 HEMOGLOBIN GLYCOSYLATED A1C: CPT

## 2024-12-20 PROCEDURE — 36415 COLL VENOUS BLD VENIPUNCTURE: CPT

## 2024-12-20 PROCEDURE — 84443 ASSAY THYROID STIM HORMONE: CPT

## 2024-12-20 PROCEDURE — 80053 COMPREHEN METABOLIC PANEL: CPT

## 2024-12-20 PROCEDURE — 82306 VITAMIN D 25 HYDROXY: CPT

## 2024-12-20 PROCEDURE — 81001 URINALYSIS AUTO W/SCOPE: CPT

## 2024-12-20 PROCEDURE — 85007 BL SMEAR W/DIFF WBC COUNT: CPT

## 2024-12-20 PROCEDURE — 82043 UR ALBUMIN QUANTITATIVE: CPT

## 2024-12-30 ENCOUNTER — PRE-ADMISSION TESTING (OUTPATIENT)
Dept: ADMISSIONS | Facility: MEDICAL CENTER | Age: 79
DRG: 274 | End: 2024-12-30
Attending: INTERNAL MEDICINE
Payer: MEDICARE

## 2024-12-30 RX ORDER — CYANOCOBALAMIN (VITAMIN B-12) 1000 MCG
TABLET ORAL DAILY
COMMUNITY

## 2024-12-30 RX ORDER — MULTIVIT-MIN/IRON/FOLIC ACID/K 18-600-40
1000 CAPSULE ORAL DAILY
COMMUNITY

## 2024-12-30 RX ORDER — M-VIT,TX,IRON,MINS/CALC/FOLIC 27MG-0.4MG
1 TABLET ORAL DAILY
COMMUNITY

## 2024-12-30 RX ORDER — MULTIVIT WITH MINERALS/LUTEIN
TABLET ORAL DAILY
COMMUNITY

## 2024-12-30 NOTE — PREADMIT AVS NOTE
Current Medications   Medication Instructions    therapeutic multivitamin-minerals (THERAGRAN-M) Tab Stop 7 days before surgery    Cyanocobalamin (B-12) 1000 MCG Tab Stop 7 days before surgery    Ascorbic Acid (VITAMIN C) 1000 MG Tab Stop 7 days before surgery    Vitamin D, Cholecalciferol, (CHOLECALCIFEROL) 25 MCG (1000 UT) Tab Stop 7 days before surgery    amiodarone (CORDARONE) 200 MG Tab Continue taking medication as prescribed, including morning of procedure     escitalopram (LEXAPRO) 10 MG Tab Continue taking medication as prescribed, including morning of procedure     Multiple Vitamins-Minerals (OCUVITE PO) Stop 7 days before surgery    terazosin (HYTRIN) 10 MG capsule Continue taking medication as prescribed, including morning of procedure     simvastatin (ZOCOR) 20 MG Tab Continue taking medication as prescribed, including morning of procedure

## 2025-01-03 DIAGNOSIS — I48.92 PAROXYSMAL ATRIAL FLUTTER (HCC): ICD-10-CM

## 2025-01-06 RX ORDER — AMIODARONE HYDROCHLORIDE 200 MG/1
200 TABLET ORAL DAILY
Qty: 100 TABLET | Refills: 1 | Status: SHIPPED | OUTPATIENT
Start: 2025-01-06

## 2025-01-07 ENCOUNTER — PRE-ADMISSION TESTING (OUTPATIENT)
Dept: ADMISSIONS | Facility: MEDICAL CENTER | Age: 80
DRG: 274 | End: 2025-01-07
Attending: INTERNAL MEDICINE
Payer: MEDICARE

## 2025-01-07 DIAGNOSIS — Z01.812 PRE-OPERATIVE LABORATORY EXAMINATION: ICD-10-CM

## 2025-01-07 DIAGNOSIS — Z01.810 PRE-OPERATIVE CARDIOVASCULAR EXAMINATION: ICD-10-CM

## 2025-01-07 LAB
ANION GAP SERPL CALC-SCNC: 11 MMOL/L (ref 7–16)
APTT PPP: 27.8 SEC (ref 24.7–36)
BASOPHILS # BLD AUTO: 0.8 % (ref 0–1.8)
BASOPHILS # BLD: 0.07 K/UL (ref 0–0.12)
BUN SERPL-MCNC: 33 MG/DL (ref 8–22)
BURR CELLS BLD QL SMEAR: NORMAL
CALCIUM SERPL-MCNC: 9.6 MG/DL (ref 8.5–10.5)
CHLORIDE SERPL-SCNC: 106 MMOL/L (ref 96–112)
CO2 SERPL-SCNC: 23 MMOL/L (ref 20–33)
COMMENT NL1176: NORMAL
CREAT SERPL-MCNC: 1.77 MG/DL (ref 0.5–1.4)
EKG IMPRESSION: NORMAL
EOSINOPHIL # BLD AUTO: 0.07 K/UL (ref 0–0.51)
EOSINOPHIL NFR BLD: 0.8 % (ref 0–6.9)
ERYTHROCYTE [DISTWIDTH] IN BLOOD BY AUTOMATED COUNT: 37 FL (ref 35.9–50)
GFR SERPLBLD CREATININE-BSD FMLA CKD-EPI: 38 ML/MIN/1.73 M 2
GLUCOSE SERPL-MCNC: 112 MG/DL (ref 65–99)
HCT VFR BLD AUTO: 35.8 % (ref 42–52)
HGB BLD-MCNC: 11.1 G/DL (ref 14–18)
INR PPP: 1.16 (ref 0.87–1.13)
LYMPHOCYTES # BLD AUTO: 4.85 K/UL (ref 1–4.8)
LYMPHOCYTES NFR BLD: 53.3 % (ref 22–41)
MANUAL DIFF BLD: NORMAL
MCH RBC QN AUTO: 20.3 PG (ref 27–33)
MCHC RBC AUTO-ENTMCNC: 31 G/DL (ref 32.3–36.5)
MCV RBC AUTO: 65.3 FL (ref 81.4–97.8)
MICROCYTES BLD QL SMEAR: ABNORMAL
MONOCYTES # BLD AUTO: 0.3 K/UL (ref 0–0.85)
MONOCYTES NFR BLD AUTO: 3.3 % (ref 0–13.4)
MORPHOLOGY BLD-IMP: NORMAL
NEUTROPHILS # BLD AUTO: 3.8 K/UL (ref 1.82–7.42)
NEUTROPHILS NFR BLD: 41.8 % (ref 44–72)
NRBC # BLD AUTO: 0 K/UL
NRBC BLD-RTO: 0 /100 WBC (ref 0–0.2)
OVALOCYTES BLD QL SMEAR: NORMAL
PLATELET # BLD AUTO: 196 K/UL (ref 164–446)
PLATELET BLD QL SMEAR: NORMAL
PMV BLD AUTO: 11.2 FL (ref 9–12.9)
POIKILOCYTOSIS BLD QL SMEAR: NORMAL
POTASSIUM SERPL-SCNC: 4.4 MMOL/L (ref 3.6–5.5)
PROTHROMBIN TIME: 14.8 SEC (ref 12–14.6)
RBC # BLD AUTO: 5.48 M/UL (ref 4.7–6.1)
RBC BLD AUTO: PRESENT
SCHISTOCYTES BLD QL SMEAR: NORMAL
SODIUM SERPL-SCNC: 140 MMOL/L (ref 135–145)
WBC # BLD AUTO: 9.1 K/UL (ref 4.8–10.8)

## 2025-01-07 PROCEDURE — 85730 THROMBOPLASTIN TIME PARTIAL: CPT

## 2025-01-07 PROCEDURE — 80048 BASIC METABOLIC PNL TOTAL CA: CPT

## 2025-01-07 PROCEDURE — 85027 COMPLETE CBC AUTOMATED: CPT

## 2025-01-07 PROCEDURE — 93010 ELECTROCARDIOGRAM REPORT: CPT | Performed by: INTERNAL MEDICINE

## 2025-01-07 PROCEDURE — 93005 ELECTROCARDIOGRAM TRACING: CPT | Mod: TC

## 2025-01-07 PROCEDURE — 85007 BL SMEAR W/DIFF WBC COUNT: CPT

## 2025-01-07 PROCEDURE — 85610 PROTHROMBIN TIME: CPT

## 2025-01-07 PROCEDURE — 36415 COLL VENOUS BLD VENIPUNCTURE: CPT

## 2025-01-13 ENCOUNTER — OFFICE VISIT (OUTPATIENT)
Dept: CARDIOLOGY | Facility: MEDICAL CENTER | Age: 80
DRG: 274 | End: 2025-01-13
Attending: INTERNAL MEDICINE
Payer: MEDICARE

## 2025-01-13 VITALS
WEIGHT: 195 LBS | DIASTOLIC BLOOD PRESSURE: 54 MMHG | RESPIRATION RATE: 16 BRPM | SYSTOLIC BLOOD PRESSURE: 100 MMHG | HEIGHT: 67 IN | BODY MASS INDEX: 30.61 KG/M2 | HEART RATE: 90 BPM | OXYGEN SATURATION: 96 %

## 2025-01-13 DIAGNOSIS — E78.5 DYSLIPIDEMIA: ICD-10-CM

## 2025-01-13 DIAGNOSIS — D68.69 SECONDARY HYPERCOAGULABLE STATE (HCC): ICD-10-CM

## 2025-01-13 DIAGNOSIS — I48.92 PAROXYSMAL ATRIAL FLUTTER (HCC): ICD-10-CM

## 2025-01-13 DIAGNOSIS — I48.0 PAROXYSMAL ATRIAL FIBRILLATION (HCC): ICD-10-CM

## 2025-01-13 PROCEDURE — 3078F DIAST BP <80 MM HG: CPT | Performed by: INTERNAL MEDICINE

## 2025-01-13 PROCEDURE — G2211 COMPLEX E/M VISIT ADD ON: HCPCS | Performed by: INTERNAL MEDICINE

## 2025-01-13 PROCEDURE — 99214 OFFICE O/P EST MOD 30 MIN: CPT | Performed by: INTERNAL MEDICINE

## 2025-01-13 PROCEDURE — 3074F SYST BP LT 130 MM HG: CPT | Performed by: INTERNAL MEDICINE

## 2025-01-13 PROCEDURE — 99212 OFFICE O/P EST SF 10 MIN: CPT | Performed by: INTERNAL MEDICINE

## 2025-01-13 ASSESSMENT — FIBROSIS 4 INDEX: FIB4 SCORE: 1.74

## 2025-01-13 NOTE — PROGRESS NOTES
Cardiology Initial Consultation Note    Date of note:    1/13/2025    Primary Care Provider: Leticia Wilkerson M.D.  Referring Provider: No ref. provider found    Patient Name: Hardy Roca   YOB: 1945  MRN:              [id number]    Chief Complaint   Patient presents with    Atrial Flutter    Aortic Stenosis    Premature Ventricular Contractions (PVCs)       History of Present Illness: Mr. 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    He is an established patient of our practice.  Recently saw Dr. Yoder cardiac consultation for consideration of left appendage occlusion device implant with watchman.  Patient was found to have superficial siderosis and given his history of intraparenchymal hemorrhage, neurology deemed him at high risk for recurrent bleeding so OAC was discontinued altogether.  He was seen in consultation with tentative plans for Watchman device implant later this week on 1/16/2025.    He presents today and is accompanied by his wife.  He has no major cardiac complaints.  He feels well overall.  Denies having chest pain or dyspnea.  No palpitations.  No episodes of syncope.      Cardiovascular Risk Factors:  1. Smoking status: Denies  2. Type II Diabetes Mellitus: Denies   Lab Results   Component Value Date/Time    HBA1C 4.9 12/20/2024 09:40 AM    HBA1C 5.5 07/01/2024 08:41 AM     3. Hypertension: Denies  4. Dyslipidemia: Yes   Cholesterol,Tot   Date Value Ref Range Status   12/20/2024 125 100 - 199 mg/dL Final     LDL   Date Value Ref Range Status   12/20/2024 72 <100 mg/dL Final     HDL   Date Value Ref Range Status   12/20/2024 32 (A) >=40 mg/dL Final      Triglycerides   Date Value Ref Range Status   12/20/2024 103 0 - 149 mg/dL Final     5. Family history of early Coronary Artery Disease in a first degree relative (Male less than 55 years of age; Female less than 65 years of age): Denies      Review of Systems:  As per HPI.  Review of systems assessed and are negative except as stated above.      Past Medical History:   Diagnosis Date    Atrial fibrillation (HCC) 2024    Atrial flutter (HCC)     BPH (benign prostatic hypertrophy)     CATARACT 2006, 2007    both done, one at a time    Hemorrhagic disorder (HCC) 10/2024    brain bleed    High cholesterol     HLD (hyperlipidemia)     Hypertension     no medication    Thalassemia     Unspecified urinary incontinence     prostate enlargement , per patient         Past Surgical History:   Procedure Laterality Date    RECOVERY  11/23/2015    Procedure: CATH LAB-CARDIOVERSION-KOZTOWSKI-ANESTHESIA-ICD 10: I48.91;  Surgeon: Recoveryonly Surgery;  [place] [facility] [place]:     APPENDECTOMY  2010    INGUINAL HERNIA REPAIR  10/28/2009    Performed by ANGELI CHOI at SURGERY [facility]    RETINAL DETACHMENT REPAIR Right 1998    OTHER  01/01/1982    tonsillectomy    CATARACT EXTRACTION WITH IOL      bilateral 2006.2007         Current Outpatient Medications   Medication Sig Dispense Refill    amiodarone (CORDARONE) 200 MG Tab TAKE 1 TABLET BY MOUTH EVERY  Tablet 1    therapeutic multivitamin-minerals (THERAGRAN-M) Tab Take 1 Tablet by mouth every day.      Cyanocobalamin (B-12) 1000 MCG Tab Take  by mouth every day.      Ascorbic Acid (VITAMIN C) 1000 MG Tab Take  by mouth every day.      Vitamin D, Cholecalciferol, (CHOLECALCIFEROL) 25 MCG (1000 UT) Tab Take 1,000 Units by mouth every day.      escitalopram (LEXAPRO) 10 MG Tab Take 10 mg by mouth every day. Indications: Generalized Anxiety Disorder      Multiple Vitamins-Minerals (OCUVITE PO) Take 1 Capsule by mouth every day.  "Indications: Nutritional supplement      terazosin (HYTRIN) 10 MG capsule Take 10 mg by mouth every evening. Indications: Benign Enlargement of Prostate      simvastatin (ZOCOR) 20 MG Tab Take 1 Tab by mouth every evening. 30 Tab 11     No current facility-administered medications for this visit.         No Known Allergies      Family History   Problem Relation Age of Onset    Heart Disease Neg Hx     Heart Failure Neg Hx          Social History     Socioeconomic History    Marital status:      Spouse name: Not on file    Number of children: Not on file    Years of education: Not on file    Highest education level: Not on file   Occupational History    Not on file   Tobacco Use    Smoking status: Never    Smokeless tobacco: Never   Vaping Use    Vaping status: Never Used   Substance and Sexual Activity    Alcohol use: Not Currently     Comment: rarely    Drug use: No    Sexual activity: Not on file   Other Topics Concern    Not on file   Social History Narrative    Not on file     Social Drivers of Health     Financial Resource Strain: Not on file   Food Insecurity: Not on file   Transportation Needs: Not on file   Physical Activity: Not on file   Stress: Not on file   Social Connections: Feeling Socially Integrated (6/20/2024)    OASIS : Social Isolation     Frequency of experiencing loneliness or isolation: Never   Intimate Partner Violence: Not on file   Housing Stability: Not on file         Physical Exam:  Ambulatory Vitals  /54 (BP Location: Left arm, Patient Position: Sitting, BP Cuff Size: Adult)   Pulse 90   Resp 16   Ht 1.702 m (5' 7\")   Wt 88.5 kg (195 lb)   SpO2 96%    Oxygen Therapy:  Pulse Oximetry: 96 %  BP Readings from Last 4 Encounters:   01/13/25 100/54   12/11/24 102/64   11/11/24 106/68   11/06/24 102/60       Weight/BMI: Body mass index is 30.54 kg/m².  Wt Readings from Last 4 Encounters:   01/13/25 88.5 kg (195 lb)   12/11/24 88 kg (194 lb)   11/11/24 89 kg (196 lb 3.4 " oz)   11/06/24 88.9 kg (196 lb)         General: Not in acute distress, appears comfortable  HEENT: OP clear   Neck:  No carotid bruits, No JVD appreciated  CVS:  RRR, Normal S1, S2. No murmurs, rubs or gallops  Resp: Normal respiratory effort, lungs CTA bilaterally. No rales or rhonchi  Abdomen: Soft, non-distended  Skin: No obvious rashes, no cyanosis  Neurological: Moves all extremities, no focal neurologic deficits  Extremities:   Extremities warm, 2+ bilateral radial pulses.  2+ bilateral dp pulses, no lower extremity edema bilaterally      Lab Data Review:  Lab Results   Component Value Date/Time    CHOLSTRLTOT 125 12/20/2024 09:40 AM    LDL 72 12/20/2024 09:40 AM    HDL 32 (A) 12/20/2024 09:40 AM    TRIGLYCERIDE 103 12/20/2024 09:40 AM       Lab Results   Component Value Date/Time    SODIUM 140 01/07/2025 10:32 AM    POTASSIUM 4.4 01/07/2025 10:32 AM    CHLORIDE 106 01/07/2025 10:32 AM    CO2 23 01/07/2025 10:32 AM    GLUCOSE 112 (H) 01/07/2025 10:32 AM    BUN 33 (H) 01/07/2025 10:32 AM    CREATININE 1.77 (H) 01/07/2025 10:32 AM     Lab Results   Component Value Date/Time    ALKPHOSPHAT 35 12/20/2024 09:40 AM    ASTSGOT 23 12/20/2024 09:40 AM    ALTSGPT 29 12/20/2024 09:40 AM    TBILIRUBIN 0.8 12/20/2024 09:40 AM      Lab Results   Component Value Date/Time    WBC 9.1 01/07/2025 10:32 AM    HEMOGLOBIN 11.1 (L) 01/07/2025 10:32 AM     Lab Results   Component Value Date/Time    HBA1C 4.9 12/20/2024 09:40 AM    HBA1C 5.5 07/01/2024 08:41 AM         Cardiac Imaging and Procedures Review:    EKG dated 1/7/2025:   My personal interpretation is sinus rhythm    Echo dated 4/17/2024:   Compared to the images of the prior study 02/27/2019 - the estimate of   right ventricular systolic pressure is increased, previously normal.  Normal left ventricular systolic function.  The ejection fraction is measured to be 72 % by Cuenca's biplane.  Normal diastolic function.  The right ventricle is normal in size and systolic  function.  Mild aortic insufficiency.  Trace tricuspid regurgitation.  Right ventricular systolic pressure is estimated to be 40 mmHg.        Assessment & Plan     1. Paroxysmal atrial fibrillation (HCC)        2. Paroxysmal atrial flutter (HCC)        3. Secondary hypercoagulable state (HCC)        4. Dyslipidemia              Medical Decision Making:  Mr. 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    1. Paroxysmal atrial fibrillation (HCC)  2. Paroxysmal atrial flutter (HCC)  -Known history of PAF/atrial flutter with history of prior cardioversion in 2015.  Due to superficial siderosis and high risk for bleeding as per neurology, not a good candidate for long-term systemic anticoagulation.  He is currently no longer on Eliquis.  There are tentative plans for Watchman device implant later this week on 1/16/2025.  -Continue amiodarone 200 mg daily for rhythm control strategy    3. Secondary hypercoagulable state (HCC)  -Not a candidate for long-term OAC.  Plan for Watchman device implant    4. Dyslipidemia  -Continue simvastatin 20 mg daily.    () Today's E/M visit is associated with medical care services that serve as the continuing focal point for all needed health care services and/or with medical care services that  are part of ongoing care related to a patient's single, serious condition, or a complex condition: This includes  furnishing services to patients on an ongoing basis that result in care that is personalized  to the patient. The services result in a comprehensive, longitudinal, and continuous  relationship with the patient and involve delivery of team-based care that is  accessible, coordinated with other practitioners and providers, and integrated with the broader health care landscape.     It was a pleasure seeing Mr. Hardy Roca in the office today. Return in about 6 months (around 7/13/2025) for Atrial fibrillation. Patient is aware to call the cardiology clinic with any questions or concerns.      Taye Eid MD, [facility]  Cardiologist, [facility], [facility]  [address] [address]  Phone: [phone]  Fax: [phone]    Please note that this dictation was created using voice recognition software. I have made every reasonable attempt to correct obvious errors, but it is possible there are errors of grammar and possibly content that I did not discover before finalizing the note.

## 2025-01-15 ENCOUNTER — HOSPITAL ENCOUNTER (OUTPATIENT)
Dept: RADIOLOGY | Facility: MEDICAL CENTER | Age: 80
DRG: 274 | End: 2025-01-15
Attending: PSYCHIATRY & NEUROLOGY
Payer: MEDICARE

## 2025-01-15 DIAGNOSIS — J63.4 SUPERFICIAL SIDEROSIS PRESENT ON MAGNETIC RESONANCE IMAGING (HCC): ICD-10-CM

## 2025-01-15 DIAGNOSIS — Z86.79 H/O SPONTANEOUS INTRAPARENCHYMAL INTRACRANIAL HEMORRHAGE: ICD-10-CM

## 2025-01-15 PROCEDURE — 70551 MRI BRAIN STEM W/O DYE: CPT

## 2025-01-16 ENCOUNTER — HOSPITAL ENCOUNTER (INPATIENT)
Facility: MEDICAL CENTER | Age: 80
LOS: 1 days | DRG: 274 | End: 2025-01-17
Attending: INTERNAL MEDICINE | Admitting: INTERNAL MEDICINE
Payer: MEDICARE

## 2025-01-16 ENCOUNTER — APPOINTMENT (OUTPATIENT)
Dept: CARDIOLOGY | Facility: MEDICAL CENTER | Age: 80
DRG: 274 | End: 2025-01-16
Attending: INTERNAL MEDICINE
Payer: MEDICARE

## 2025-01-16 ENCOUNTER — ANESTHESIA EVENT (OUTPATIENT)
Dept: CARDIOLOGY | Facility: MEDICAL CENTER | Age: 80
DRG: 274 | End: 2025-01-16
Payer: MEDICARE

## 2025-01-16 ENCOUNTER — ANESTHESIA (OUTPATIENT)
Dept: CARDIOLOGY | Facility: MEDICAL CENTER | Age: 80
DRG: 274 | End: 2025-01-16
Payer: MEDICARE

## 2025-01-16 DIAGNOSIS — Z95.818 PRESENCE OF WATCHMAN LEFT ATRIAL APPENDAGE CLOSURE DEVICE: ICD-10-CM

## 2025-01-16 DIAGNOSIS — I48.92 ATRIAL FLUTTER, UNSPECIFIED TYPE (HCC): ICD-10-CM

## 2025-01-16 DIAGNOSIS — I48.0 PAROXYSMAL ATRIAL FIBRILLATION (HCC): ICD-10-CM

## 2025-01-16 LAB
ACT BLD: 193 SEC (ref 74–137)
ANION GAP SERPL CALC-SCNC: 10 MMOL/L (ref 7–16)
APTT PPP: 26.4 SEC (ref 24.7–36)
BUN SERPL-MCNC: 36 MG/DL (ref 8–22)
CALCIUM SERPL-MCNC: 9.5 MG/DL (ref 8.5–10.5)
CHLORIDE SERPL-SCNC: 108 MMOL/L (ref 96–112)
CO2 SERPL-SCNC: 22 MMOL/L (ref 20–33)
CREAT SERPL-MCNC: 1.84 MG/DL (ref 0.5–1.4)
EKG IMPRESSION: NORMAL
ERYTHROCYTE [DISTWIDTH] IN BLOOD BY AUTOMATED COUNT: 36.2 FL (ref 35.9–50)
GFR SERPLBLD CREATININE-BSD FMLA CKD-EPI: 37 ML/MIN/1.73 M 2
GLUCOSE SERPL-MCNC: 101 MG/DL (ref 65–99)
HCT VFR BLD AUTO: 34.1 % (ref 42–52)
HGB BLD-MCNC: 11.1 G/DL (ref 14–18)
INR PPP: 1.11 (ref 0.87–1.13)
LV EJECT FRACT  99904: 65
MCH RBC QN AUTO: 20.6 PG (ref 27–33)
MCHC RBC AUTO-ENTMCNC: 32.6 G/DL (ref 32.3–36.5)
MCV RBC AUTO: 63.3 FL (ref 81.4–97.8)
PLATELET # BLD AUTO: 178 K/UL (ref 164–446)
PMV BLD AUTO: 10.1 FL (ref 9–12.9)
POTASSIUM SERPL-SCNC: 4.6 MMOL/L (ref 3.6–5.5)
PROTHROMBIN TIME: 14.3 SEC (ref 12–14.6)
RBC # BLD AUTO: 5.39 M/UL (ref 4.7–6.1)
SODIUM SERPL-SCNC: 140 MMOL/L (ref 135–145)
WBC # BLD AUTO: 10.8 K/UL (ref 4.8–10.8)

## 2025-01-16 PROCEDURE — 700105 HCHG RX REV CODE 258: Performed by: ANESTHESIOLOGY

## 2025-01-16 PROCEDURE — 85347 COAGULATION TIME ACTIVATED: CPT

## 2025-01-16 PROCEDURE — A9270 NON-COVERED ITEM OR SERVICE: HCPCS | Performed by: INTERNAL MEDICINE

## 2025-01-16 PROCEDURE — B24BZZ4 ULTRASONOGRAPHY OF HEART WITH AORTA, TRANSESOPHAGEAL: ICD-10-PCS | Performed by: INTERNAL MEDICINE

## 2025-01-16 PROCEDURE — 700111 HCHG RX REV CODE 636 W/ 250 OVERRIDE (IP): Performed by: ANESTHESIOLOGY

## 2025-01-16 PROCEDURE — 93005 ELECTROCARDIOGRAM TRACING: CPT | Mod: TC | Performed by: INTERNAL MEDICINE

## 2025-01-16 PROCEDURE — 160046 HCHG PACU - 1ST 60 MINS PHASE II

## 2025-01-16 PROCEDURE — C1894 INTRO/SHEATH, NON-LASER: HCPCS

## 2025-01-16 PROCEDURE — 85610 PROTHROMBIN TIME: CPT

## 2025-01-16 PROCEDURE — 85730 THROMBOPLASTIN TIME PARTIAL: CPT

## 2025-01-16 PROCEDURE — 02L73DK OCCLUSION OF LEFT ATRIAL APPENDAGE WITH INTRALUMINAL DEVICE, PERCUTANEOUS APPROACH: ICD-10-PCS | Performed by: INTERNAL MEDICINE

## 2025-01-16 PROCEDURE — 770020 HCHG ROOM/CARE - TELE (206)

## 2025-01-16 PROCEDURE — 33340 PERQ CLSR TCAT L ATR APNDGE: CPT | Performed by: INTERNAL MEDICINE

## 2025-01-16 PROCEDURE — 160002 HCHG RECOVERY MINUTES (STAT)

## 2025-01-16 PROCEDURE — 80048 BASIC METABOLIC PNL TOTAL CA: CPT

## 2025-01-16 PROCEDURE — 700111 HCHG RX REV CODE 636 W/ 250 OVERRIDE (IP)

## 2025-01-16 PROCEDURE — 85027 COMPLETE CBC AUTOMATED: CPT

## 2025-01-16 PROCEDURE — 700101 HCHG RX REV CODE 250: Performed by: ANESTHESIOLOGY

## 2025-01-16 PROCEDURE — 93010 ELECTROCARDIOGRAM REPORT: CPT | Performed by: INTERNAL MEDICINE

## 2025-01-16 PROCEDURE — 700117 HCHG RX CONTRAST REV CODE 255: Performed by: INTERNAL MEDICINE

## 2025-01-16 PROCEDURE — 93319 3D ECHO IMG CGEN CAR ANOMAL: CPT

## 2025-01-16 PROCEDURE — 700102 HCHG RX REV CODE 250 W/ 637 OVERRIDE(OP): Performed by: INTERNAL MEDICINE

## 2025-01-16 PROCEDURE — 700101 HCHG RX REV CODE 250

## 2025-01-16 PROCEDURE — 160047 HCHG PACU  - EA ADDL 30 MINS PHASE II

## 2025-01-16 PROCEDURE — 160035 HCHG PACU - 1ST 60 MINS PHASE I

## 2025-01-16 RX ORDER — HEPARIN SODIUM 200 [USP'U]/100ML
INJECTION, SOLUTION INTRAVENOUS
Status: COMPLETED
Start: 2025-01-16 | End: 2025-01-16

## 2025-01-16 RX ORDER — SODIUM CHLORIDE 9 MG/ML
1.5 INJECTION, SOLUTION INTRAVENOUS CONTINUOUS
Status: ACTIVE | OUTPATIENT
Start: 2025-01-16 | End: 2025-01-16

## 2025-01-16 RX ORDER — CLOPIDOGREL BISULFATE 75 MG/1
75 TABLET ORAL DAILY
Status: DISCONTINUED | OUTPATIENT
Start: 2025-01-17 | End: 2025-01-17 | Stop reason: HOSPADM

## 2025-01-16 RX ORDER — CLOPIDOGREL 300 MG/1
300 TABLET, FILM COATED ORAL ONCE
Status: COMPLETED | OUTPATIENT
Start: 2025-01-16 | End: 2025-01-16

## 2025-01-16 RX ORDER — OXYCODONE HCL 5 MG/5 ML
5 SOLUTION, ORAL ORAL
Status: DISCONTINUED | OUTPATIENT
Start: 2025-01-16 | End: 2025-01-16 | Stop reason: HOSPADM

## 2025-01-16 RX ORDER — CEFAZOLIN SODIUM 1 G/3ML
INJECTION, POWDER, FOR SOLUTION INTRAMUSCULAR; INTRAVENOUS PRN
Status: DISCONTINUED | OUTPATIENT
Start: 2025-01-16 | End: 2025-01-16 | Stop reason: SURG

## 2025-01-16 RX ORDER — ONDANSETRON 2 MG/ML
INJECTION INTRAMUSCULAR; INTRAVENOUS PRN
Status: DISCONTINUED | OUTPATIENT
Start: 2025-01-16 | End: 2025-01-16 | Stop reason: SURG

## 2025-01-16 RX ORDER — HYDRALAZINE HYDROCHLORIDE 20 MG/ML
5 INJECTION INTRAMUSCULAR; INTRAVENOUS
Status: DISCONTINUED | OUTPATIENT
Start: 2025-01-16 | End: 2025-01-16 | Stop reason: HOSPADM

## 2025-01-16 RX ORDER — BUPIVACAINE HYDROCHLORIDE 5 MG/ML
INJECTION, SOLUTION EPIDURAL; INTRACAUDAL
Status: COMPLETED
Start: 2025-01-16 | End: 2025-01-16

## 2025-01-16 RX ORDER — DEXAMETHASONE SODIUM PHOSPHATE 4 MG/ML
INJECTION, SOLUTION INTRA-ARTICULAR; INTRALESIONAL; INTRAMUSCULAR; INTRAVENOUS; SOFT TISSUE PRN
Status: DISCONTINUED | OUTPATIENT
Start: 2025-01-16 | End: 2025-01-16 | Stop reason: SURG

## 2025-01-16 RX ORDER — ASPIRIN 81 MG/1
325 TABLET ORAL ONCE
Status: COMPLETED | OUTPATIENT
Start: 2025-01-16 | End: 2025-01-16

## 2025-01-16 RX ORDER — EPHEDRINE SULFATE 50 MG/ML
5 INJECTION, SOLUTION INTRAVENOUS
Status: DISCONTINUED | OUTPATIENT
Start: 2025-01-16 | End: 2025-01-16 | Stop reason: HOSPADM

## 2025-01-16 RX ORDER — LIDOCAINE HYDROCHLORIDE 20 MG/ML
INJECTION, SOLUTION EPIDURAL; INFILTRATION; INTRACAUDAL; PERINEURAL PRN
Status: DISCONTINUED | OUTPATIENT
Start: 2025-01-16 | End: 2025-01-16 | Stop reason: SURG

## 2025-01-16 RX ORDER — AMIODARONE HYDROCHLORIDE 200 MG/1
200 TABLET ORAL DAILY
Status: DISCONTINUED | OUTPATIENT
Start: 2025-01-16 | End: 2025-01-17 | Stop reason: HOSPADM

## 2025-01-16 RX ORDER — ASPIRIN 81 MG/1
81 TABLET ORAL DAILY
Status: DISCONTINUED | OUTPATIENT
Start: 2025-01-17 | End: 2025-01-17 | Stop reason: HOSPADM

## 2025-01-16 RX ORDER — TERAZOSIN 5 MG/1
10 CAPSULE ORAL EVERY EVENING
Status: DISCONTINUED | OUTPATIENT
Start: 2025-01-16 | End: 2025-01-17 | Stop reason: HOSPADM

## 2025-01-16 RX ORDER — SODIUM CHLORIDE, SODIUM LACTATE, POTASSIUM CHLORIDE, CALCIUM CHLORIDE 600; 310; 30; 20 MG/100ML; MG/100ML; MG/100ML; MG/100ML
INJECTION, SOLUTION INTRAVENOUS CONTINUOUS
Status: DISCONTINUED | OUTPATIENT
Start: 2025-01-16 | End: 2025-01-17

## 2025-01-16 RX ORDER — HALOPERIDOL 5 MG/ML
1 INJECTION INTRAMUSCULAR
Status: DISCONTINUED | OUTPATIENT
Start: 2025-01-16 | End: 2025-01-16 | Stop reason: HOSPADM

## 2025-01-16 RX ORDER — LIDOCAINE HYDROCHLORIDE 20 MG/ML
INJECTION, SOLUTION INFILTRATION; PERINEURAL
Status: COMPLETED
Start: 2025-01-16 | End: 2025-01-16

## 2025-01-16 RX ORDER — VITAMIN B COMPLEX
1000 TABLET ORAL DAILY
Status: DISCONTINUED | OUTPATIENT
Start: 2025-01-16 | End: 2025-01-17 | Stop reason: HOSPADM

## 2025-01-16 RX ORDER — ALBUTEROL SULFATE 5 MG/ML
2.5 SOLUTION RESPIRATORY (INHALATION)
Status: DISCONTINUED | OUTPATIENT
Start: 2025-01-16 | End: 2025-01-16 | Stop reason: HOSPADM

## 2025-01-16 RX ORDER — LIDOCAINE HYDROCHLORIDE 40 MG/ML
SOLUTION TOPICAL PRN
Status: DISCONTINUED | OUTPATIENT
Start: 2025-01-16 | End: 2025-01-16 | Stop reason: SURG

## 2025-01-16 RX ORDER — DIPHENHYDRAMINE HYDROCHLORIDE 50 MG/ML
12.5 INJECTION INTRAMUSCULAR; INTRAVENOUS
Status: DISCONTINUED | OUTPATIENT
Start: 2025-01-16 | End: 2025-01-16 | Stop reason: HOSPADM

## 2025-01-16 RX ORDER — SIMVASTATIN 20 MG
20 TABLET ORAL EVERY EVENING
Status: DISCONTINUED | OUTPATIENT
Start: 2025-01-16 | End: 2025-01-17 | Stop reason: HOSPADM

## 2025-01-16 RX ORDER — HEPARIN SODIUM 1000 [USP'U]/ML
INJECTION, SOLUTION INTRAVENOUS; SUBCUTANEOUS
Status: COMPLETED
Start: 2025-01-16 | End: 2025-01-16

## 2025-01-16 RX ORDER — ESCITALOPRAM OXALATE 10 MG/1
10 TABLET ORAL DAILY
Status: DISCONTINUED | OUTPATIENT
Start: 2025-01-16 | End: 2025-01-17 | Stop reason: HOSPADM

## 2025-01-16 RX ORDER — OXYCODONE HCL 5 MG/5 ML
10 SOLUTION, ORAL ORAL
Status: DISCONTINUED | OUTPATIENT
Start: 2025-01-16 | End: 2025-01-16 | Stop reason: HOSPADM

## 2025-01-16 RX ORDER — LIDOCAINE HYDROCHLORIDE 40 MG/ML
SOLUTION TOPICAL
Status: COMPLETED
Start: 2025-01-16 | End: 2025-01-16

## 2025-01-16 RX ORDER — SODIUM CHLORIDE, SODIUM LACTATE, POTASSIUM CHLORIDE, CALCIUM CHLORIDE 600; 310; 30; 20 MG/100ML; MG/100ML; MG/100ML; MG/100ML
INJECTION, SOLUTION INTRAVENOUS
Status: DISCONTINUED | OUTPATIENT
Start: 2025-01-16 | End: 2025-01-16 | Stop reason: SURG

## 2025-01-16 RX ORDER — ONDANSETRON 2 MG/ML
4 INJECTION INTRAMUSCULAR; INTRAVENOUS
Status: DISCONTINUED | OUTPATIENT
Start: 2025-01-16 | End: 2025-01-16 | Stop reason: HOSPADM

## 2025-01-16 RX ADMIN — BUPIVACAINE HYDROCHLORIDE: 5 INJECTION, SOLUTION EPIDURAL; INTRACAUDAL; PERINEURAL at 08:36

## 2025-01-16 RX ADMIN — SODIUM CHLORIDE, POTASSIUM CHLORIDE, SODIUM LACTATE AND CALCIUM CHLORIDE: 600; 310; 30; 20 INJECTION, SOLUTION INTRAVENOUS at 08:37

## 2025-01-16 RX ADMIN — CLOPIDOGREL BISULFATE 300 MG: 300 TABLET, FILM COATED ORAL at 14:39

## 2025-01-16 RX ADMIN — ONDANSETRON 4 MG: 2 INJECTION INTRAMUSCULAR; INTRAVENOUS at 09:31

## 2025-01-16 RX ADMIN — HEPARIN SODIUM: 1000 INJECTION, SOLUTION INTRAVENOUS; SUBCUTANEOUS at 08:36

## 2025-01-16 RX ADMIN — Medication 1000 UNITS: at 14:25

## 2025-01-16 RX ADMIN — HEPARIN SODIUM 2000 UNITS: 200 INJECTION, SOLUTION INTRAVENOUS at 08:36

## 2025-01-16 RX ADMIN — TERAZOSIN HYDROCHLORIDE 10 MG: 5 CAPSULE ORAL at 17:15

## 2025-01-16 RX ADMIN — CEFAZOLIN 2 G: 1 INJECTION, POWDER, FOR SOLUTION INTRAMUSCULAR; INTRAVENOUS at 08:51

## 2025-01-16 RX ADMIN — AMIODARONE HYDROCHLORIDE 200 MG: 200 TABLET ORAL at 14:25

## 2025-01-16 RX ADMIN — SIMVASTATIN 20 MG: 20 TABLET, FILM COATED ORAL at 17:15

## 2025-01-16 RX ADMIN — SODIUM CHLORIDE, POTASSIUM CHLORIDE, SODIUM LACTATE AND CALCIUM CHLORIDE: 600; 310; 30; 20 INJECTION, SOLUTION INTRAVENOUS at 14:25

## 2025-01-16 RX ADMIN — SUGAMMADEX 200 MG: 100 INJECTION, SOLUTION INTRAVENOUS at 09:31

## 2025-01-16 RX ADMIN — DEXAMETHASONE SODIUM PHOSPHATE 4 MG: 4 INJECTION INTRA-ARTICULAR; INTRALESIONAL; INTRAMUSCULAR; INTRAVENOUS; SOFT TISSUE at 08:51

## 2025-01-16 RX ADMIN — ROCURONIUM BROMIDE 50 MG: 10 INJECTION, SOLUTION INTRAVENOUS at 08:45

## 2025-01-16 RX ADMIN — ASPIRIN 324 MG: 81 TABLET, COATED ORAL at 14:25

## 2025-01-16 RX ADMIN — LIDOCAINE HYDROCHLORIDE 100 MG: 20 INJECTION, SOLUTION EPIDURAL; INFILTRATION; INTRACAUDAL; PERINEURAL at 08:45

## 2025-01-16 RX ADMIN — LIDOCAINE HYDROCHLORIDE 4 ML: 40 SOLUTION TOPICAL at 08:45

## 2025-01-16 RX ADMIN — ESCITALOPRAM OXALATE 10 MG: 10 TABLET ORAL at 14:25

## 2025-01-16 RX ADMIN — PROPOFOL 150 MG: 10 INJECTION, EMULSION INTRAVENOUS at 08:45

## 2025-01-16 RX ADMIN — LIDOCAINE HYDROCHLORIDE: 20 INJECTION, SOLUTION INFILTRATION; PERINEURAL at 08:36

## 2025-01-16 RX ADMIN — IOHEXOL 5 ML: 350 INJECTION, SOLUTION INTRAVENOUS at 09:28

## 2025-01-16 ASSESSMENT — COGNITIVE AND FUNCTIONAL STATUS - GENERAL
TOILETING: A LITTLE
DAILY ACTIVITIY SCORE: 21
TURNING FROM BACK TO SIDE WHILE IN FLAT BAD: A LITTLE
MOVING FROM LYING ON BACK TO SITTING ON SIDE OF FLAT BED: A LITTLE
WALKING IN HOSPITAL ROOM: A LITTLE
STANDING UP FROM CHAIR USING ARMS: A LITTLE
DRESSING REGULAR LOWER BODY CLOTHING: A LITTLE
MOBILITY SCORE: 18
HELP NEEDED FOR BATHING: A LITTLE
CLIMB 3 TO 5 STEPS WITH RAILING: A LITTLE
SUGGESTED CMS G CODE MODIFIER DAILY ACTIVITY: CJ
MOVING TO AND FROM BED TO CHAIR: A LITTLE
SUGGESTED CMS G CODE MODIFIER MOBILITY: CK

## 2025-01-16 ASSESSMENT — FIBROSIS 4 INDEX
FIB4 SCORE: 1.74
FIB4 SCORE: 1.92

## 2025-01-16 ASSESSMENT — PAIN SCALES - GENERAL: PAIN_LEVEL: 0

## 2025-01-16 ASSESSMENT — PAIN DESCRIPTION - PAIN TYPE: TYPE: ACUTE PAIN

## 2025-01-16 NOTE — ANESTHESIA PROCEDURE NOTES
SAMARA    Date/Time: 1/16/2025 8:50 AM    Performed by: Eamon Vázquez M.D.  Authorized by: Eamon Vázquez M.D.    Start Time:1/16/2025 8:50 AM  Preanesthetic Checklist: patient identified, IV checked, site marked, risks and benefits discussed, surgical consent, monitors and equipment checked, pre-op evaluation and timeout performed    Indication for SAMARA: diagnostic   Patient Location: OR  Intubated: Yes  Bite Block: Yes  Heart Visualized: Yes  Insertion: atraumatic    **See FULL SAMARA report in patient's chart via CV Synapse**

## 2025-01-16 NOTE — PROGRESS NOTES
Report received and patient care assumed. Pt is resting in bed, A&O4, with no complaints of pain, and is on RA. Tele box on. All fall precautions are in place, belongings at bedside table.  Pt was updated on POC, no questions or concerns. Pt educated on use of call light for assistance.

## 2025-01-16 NOTE — OR NURSING
0947 - Patient arrival to PPU after watchmen placement. Assessed patient with Melissa WHITE. Pt wakes to verbal. Dressing to right groin is C/D/soft. VSS. Denies pain at this time. Educated patient on plan of care and bedrest instructions.   0951 - updated patient's wife Joanna  0958 - family at bedside  1015 - Tolerating oral intake.  1131 - belongings returned to patient at bedside  1145 - patient sat up in bed  1215 - steady ambulation to bathroom with walker  1232 - called report to Marion WHITE. All questions addressed  1245 - Right groin site assessed at bedside, it is C/D/soft. Patient transported to [facility] on continuous monitoring with Malik WHITE and family and all personal belongings present.

## 2025-01-16 NOTE — ANESTHESIA PROCEDURE NOTES
Airway    Date/Time: 1/16/2025 8:45 AM    Performed by: Eamon Vázquez M.D.  Authorized by: Eamon Vázquez M.D.    Location:  OR  Urgency:  Elective  Difficult Airway: No    Indications for Airway Management:  Anesthesia      Spontaneous Ventilation: absent    Sedation Level:  Deep  Preoxygenated: Yes    Patient Position:  Sniffing  Mask Difficulty Assessment:  0 - not attempted  Final Airway Type:  Endotracheal airway  Final Endotracheal Airway:  ETT  Cuffed: Yes    Technique Used for Successful ETT Placement:  Direct laryngoscopy  Devices/Methods Used in Placement:  Anterior pressure/BURP and intubating stylet    Insertion Site:  Oral  Blade Type:  Sloan  Laryngoscope Blade/Videolaryngoscope Blade Size:  2  ETT Size (mm):  7.0  Measured from:  Teeth  ETT to Teeth (cm):  22  Placement Verified by: auscultation and capnometry    Cormack-Lehane Classification:  Grade IIb - view of arytenoids or posterior of glottis only  Number of Attempts at Approach:  1

## 2025-01-16 NOTE — CATH LAB
Cath lab RN and ANESTHESIA MD transported pt to PPU 2  on zoll. Cath lab RN, and bedside RN assessed RIGHT groin site. Groin site SOFT AND NONTENDER without evidence of hematoma. Dressing is clean, dry, and intact. Pedal pulses are 2+ bilaterally. Cath lab RN reviewed groin management protocol with bedside RN. Pt to remain on bedrest until 2 HOURS POST PROCEDURE. No further questions per bedside RN.

## 2025-01-16 NOTE — ANESTHESIA POSTPROCEDURE EVALUATION
Patient: Hardy Roca    Procedure Summary       Date: 01/16/25 Room / Location: [facility] - Cath Lab [facility]    Anesthesia Start: 0837 Anesthesia Stop: 0949    Procedure: CL-LEFT ATRIAL APPENDAGE CLOSURE Diagnosis:       Paroxysmal atrial fibrillation (HCC)      Paroxysmal atrial fibrillation      (See Associated Dx)    Scheduled Providers: Jordan Yoder M.D.; Eamon Vázquez M.D. Responsible Provider: Eamon Vázquez M.D.    Anesthesia Type: general ASA Status: 3            Final Anesthesia Type: general  Last vitals  BP   Blood Pressure : 112/59    Temp   36.6 °C (97.8 °F)    Pulse   75   Resp   20    SpO2   95 %      Anesthesia Post Evaluation    Patient location during evaluation: PACU  Patient participation: complete - patient participated  Level of consciousness: awake  Pain score: 0    Airway patency: patent  Anesthetic complications: no  Cardiovascular status: adequate  Respiratory status: acceptable  Hydration status: stable    PONV: controlled          No notable events documented.     Nurse Pain Score: 0 (NPRS)

## 2025-01-16 NOTE — CARE PLAN
The patient is Stable - Low risk of patient condition declining or worsening         Progress made toward(s) clinical / shift goals:    Problem: Knowledge Deficit - Standard  Goal: Patient and family/care givers will demonstrate understanding of plan of care, disease process/condition, diagnostic tests and medications  Outcome: Progressing     Problem: Acute Care of the Cardiac Cath Patient  Goal: Post Procedure Optimal Outcome for the Cardiac Cath Patient  Outcome: Progressing       Patient is not progressing towards the following goals:

## 2025-01-16 NOTE — ANESTHESIA PREPROCEDURE EVALUATION
Date/Time: 01/16/25 2981    Scheduled providers: Jordan Yoder M.D.; Eamon Vázquez M.D.    Procedure: CL-LEFT ATRIAL APPENDAGE CLOSURE    Diagnosis:       Paroxysmal atrial fibrillation (HCC) [I48.0]      Paroxysmal atrial fibrillation [I48.0]    Indications: See Associated Dx    Location: [facility] - Cath Lab - [facility]            Relevant Problems   CARDIAC   (positive) Atrial flutter (HCC)   (positive) PAC (premature atrial contraction)   (positive) PVCs (premature ventricular contractions)   (positive) Paroxysmal atrial fibrillation (HCC)   (positive) Paroxysmal atrial flutter (HCC)      Other   (positive) Chronic lymphocytic leukemia (HCC)       Physical Exam    Airway   Mallampati: III  TM distance: <3 FB  Neck ROM: limited       Cardiovascular   Rhythm: irregular  Rate: normal  (+) weak pulses     Dental - normal exam           Pulmonary   Breath sounds clear to auscultation     Abdominal   (-) obese     Neurological - normal exam                   Anesthesia Plan    ASA 3   ASA physical status 3 criteria: CVA or TIA - history (> 3 months) and a thrombophilic disease requiring anticoagulation    Plan - general       Airway plan will be ETT  SAMARA Planned        Induction: intravenous      Pertinent diagnostic labs and testing reviewed    Informed Consent:    Anesthetic plan and risks discussed with patient and spouse.    Use of blood products discussed with: patient and spouse whom consented to blood products.

## 2025-01-16 NOTE — ANESTHESIA TIME REPORT
Anesthesia Start and Stop Event Times       Date Time Event    1/16/2025 0743 Ready for Procedure     0837 Anesthesia Start     0949 Anesthesia Stop          Responsible Staff  01/16/25      Name Role Begin End    Eamon Vázquez M.D. Anesth 0837 0949          Overtime Reason:  no overtime (within assigned shift)    Comments:

## 2025-01-16 NOTE — PROCEDURES
"[facility] Electrophysiology/Structural Heart Procedure Note     Procedure(s) Performed:   1) Watchman YANDEL closure    Indication(s): Atrial fibrillation with high bleeding risk    Physician(s): Jordan Yoder     Anesthesia: General anaesthesia and transesophageal echocardiogram with Dr. Vázquez     Specimen(s) Removed: None     Estimated Blood Loss:  30cc     Complications:  None     Description of Procedure:   After informed written consent, the patient was brought to the cath lab in the fasting, non-sedated state. The patient was prepped and draped in the usual sterile fashion. Femoral venous access was obtained using the modified Seldinger technique. This was done under direct US guidance to visualize the needle insertion. Images were saved to the PACS system. In the right femoral vein, an 8 Fr sheath were inserted over 0.035” guidewire and exchanged for an 8.5 Fr [place] trans-septal sheath. SAMARA was used to identify the atrial septum, and left atrial appendage.  A [place] trans-septal needle was inserted to into the trans-septal sheath, and under ultrasound guidance a inferior/posterior trans-septal location was used to cross into the left atrium. Intravenous heparin was given to target an -300. A stiff exchange length 0.035\" wire was inserted into the left atrium/left pulmonary veins, over which we exchanged to the WATCHMAN delivery sheath. The WATCHMAN device was prepped and flushed. A pigtail catheter was advanced into the delivery sheath into the left atrium and then after counter clockwise torque maneuvered into the body of the left atrial appendage. Cine was taken to verify the location of the pigtail in the appendage and to assess for depth. The sheath was then advanced over the pigtail until sufficient depth was reached.  The pigtail was removed, and under wet to wet connection the WATCHMAN device was advanced through the delivery sheath until markers were aligned. The sheath was retracted " slowly to deploy the device. After the device was deployed we assessed again for leak with contrast injected through the sheath as well as a tug test. We verified good position, anchoring, size, and seal with no/minimal leak with SAMARA. After all criteria were met we detached the device from the delivery system. At the end of the procedure, heparin was reversed with protamine, the catheter and sheaths were removed, and hemostasis was achieved by manual compression along with a figure of 8 silk suture. Following recovery from anesthesia, the patient was transferred to the PPU in good condition.        Contrast used: 10 cc     Device implanted:  Size  31 mm  LOT# [id number]   Max appendage pre-measurement 21 mm but was bigger in fluoro measurements  Max device measurement 27 mm (11-25%) compression     Impressions:    1. Successful left atrial appendage closure     Recommendations:  Admit overnight, limited echo in am  Follow-up transesophageal echocardiogram in 45 days.

## 2025-01-16 NOTE — PROGRESS NOTES
4 Eyes Skin Assessment Completed by CHRISTOPHER Mazariegos and CHRISTOPHER Benitez.    Head WDL  Ears WDL  Nose WDL  Mouth WDL  Neck WDL  Breast/Chest WDL  Shoulder Blades WDL  Spine WDL  (R) Arm/Elbow/Hand WDL  (L) Arm/Elbow/Hand WDL  Abdomen WDL  Groin Incision  Scrotum/Coccyx/Buttocks WDL  (R) Leg WDL  (L) Leg WDL  (R) Heel/Foot/Toe WDL  (L) Heel/Foot/Toe WDL          Devices In Places Tele Box and Pulse Ox      Interventions In Place Pillows    Possible Skin Injury No    Pictures Uploaded Into Epic N/A  Wound Consult Placed N/A  RN Wound Prevention Protocol Ordered No

## 2025-01-16 NOTE — H&P
HPI:  79 y.o.-year-old patient here for elective transesophageal echocardiogram and left atrial appendage closure due to high bleeding risk and atrial fibrillation.     Medications / Drug list prior to admission:  No current facility-administered medications on file prior to encounter.     Current Outpatient Medications on File Prior to Encounter   Medication Sig Dispense Refill    escitalopram (LEXAPRO) 10 MG Tab Take 10 mg by mouth every day. Indications: Generalized Anxiety Disorder      terazosin (HYTRIN) 10 MG capsule Take 10 mg by mouth every evening. Indications: Benign Enlargement of Prostate      simvastatin (ZOCOR) 20 MG Tab Take 1 Tab by mouth every evening. 30 Tab 11    Multiple Vitamins-Minerals (OCUVITE PO) Take 1 Capsule by mouth every day. Indications: Nutritional supplement         Current list of administered Medications:    Current Facility-Administered Medications:     SUGAMMADEX SODIUM 200 MG/2ML IV SOLN, , , ,     LIDOCAINE HCL 4 % MT SOLN, , , ,     Past Medical History:   Diagnosis Date    Atrial fibrillation (HCC) 2024    Atrial flutter (HCC)     BPH (benign prostatic hypertrophy)     CATARACT 2006, 2007    both done, one at a time    Hemorrhagic disorder (HCC) 10/2024    brain bleed    High cholesterol     HLD (hyperlipidemia)     Hypertension     no medication    Thalassemia     Unspecified urinary incontinence     prostate enlargement , per patient       Past Surgical History:   Procedure Laterality Date    RECOVERY  11/23/2015    Procedure: CATH LAB-CARDIOVERSION-KOZTOWSKI-ANESTHESIA-ICD 10: I48.91;  Surgeon: Recoveryonly Surgery;  Location: SURGERY PRE-POST PROC UNIT [facility] [place];  Service:     APPENDECTOMY  2010    INGUINAL HERNIA REPAIR  10/28/2009    Performed by ANGELI CHOI at SURGERY [facility]    RETINAL DETACHMENT REPAIR Right 1998    OTHER  01/01/1982    tonsillectomy    CATARACT EXTRACTION WITH IOL      bilateral 2006.2007       Family History   Problem Relation Age of Onset  "   Heart Disease Neg Hx     Heart Failure Neg Hx      Patient family history was personally reviewed, no pertinent family history to current presentation    Social History     Tobacco Use    Smoking status: Never    Smokeless tobacco: Never   Vaping Use    Vaping status: Never Used   Substance Use Topics    Alcohol use: Not Currently     Comment: rarely    Drug use: No       ALLERGIES:  No Known Allergies    Review of systems:  A complete review of symptoms was reviewed with patient. This is reviewed in H&P and PMH. ALL OTHERS reviewed and negative    Physical exam:  Patient Vitals for the past 24 hrs:   BP Temp Temp src Pulse Resp SpO2 Height Weight   01/16/25 0608 112/59 36.6 °C (97.8 °F) Temporal 75 20 95 % 1.702 m (5' 7\") 88.7 kg (195 lb 8.8 oz)     General: No acute distress.   EYES: no jaundice  HEENT: OP clear   Neck:  No JVD.   CVS:  regular  Resp: Normal respiratory effort, CTAB. No wheezing or crackles/rhonchi.        Data:  Laboratory studies personally reviewed by me:  Recent Results (from the past 24 hours)   CBC WITHOUT DIFFERENTIAL    Collection Time: 01/16/25  6:15 AM   Result Value Ref Range    WBC 10.8 4.8 - 10.8 K/uL    RBC 5.39 4.70 - 6.10 M/uL    Hemoglobin 11.1 (L) 14.0 - 18.0 g/dL    Hematocrit 34.1 (L) 42.0 - 52.0 %    MCV 63.3 (L) 81.4 - 97.8 fL    MCH 20.6 (L) 27.0 - 33.0 pg    MCHC 32.6 32.3 - 36.5 g/dL    RDW 36.2 35.9 - 50.0 fL    Platelet Count 178 164 - 446 K/uL    MPV 10.1 9.0 - 12.9 fL   PT    Collection Time: 01/16/25  6:15 AM   Result Value Ref Range    PT 14.3 12.0 - 14.6 sec    INR 1.11 0.87 - 1.13   APTT    Collection Time: 01/16/25  6:15 AM   Result Value Ref Range    APTT 26.4 24.7 - 36.0 sec   Basic Metabolic Panel    Collection Time: 01/16/25  6:15 AM   Result Value Ref Range    Sodium 140 135 - 145 mmol/L    Potassium 4.6 3.6 - 5.5 mmol/L    Chloride 108 96 - 112 mmol/L    Co2 22 20 - 33 mmol/L    Glucose 101 (H) 65 - 99 mg/dL    Bun 36 (H) 8 - 22 mg/dL    Creatinine 1.84 " (H) 0.50 - 1.40 mg/dL    Calcium 9.5 8.5 - 10.5 mg/dL    Anion Gap 10.0 7.0 - 16.0   ESTIMATED GFR    Collection Time: 25  6:15 AM   Result Value Ref Range    GFR (CKD-EPI) 37 (A) >60 mL/min/1.73 m 2   EKG    Collection Time: 25  6:45 AM   Result Value Ref Range    Report       [facility]    Test Date:  2025  Pt Name:    NESSA ESCALANTE                     Department: [facility]  MRN:        [id number]                      Room:       [facility]  Gender:     Male                         Technician: OBEY  :        1945                   Requested By:NUBIA YODER  Order #:    [id number]                    Reading MD: Taye Eid MD    Measurements  Intervals                                Axis  Rate:       54                           P:          -15  CO:         234                          QRS:        -14  QRSD:       98                           T:          -5  QT:         465  QTc:        441    Interpretive Statements  Sinus bradycardia  First degree AV block  Borderline T abnormalities, inferior leads  Electronically Signed On 2025 06:45:43 PST by Taye Eid MD         Imaging:  EC-SAMARA W/O CONT    (Results Pending)   CL-LEFT ATRIAL APPENDAGE CLOSURE    (Results Pending)         Pertinent cardiac testing, EKG, echocardiogram, prior angiogram films if any reviewed    All pertinent features of laboratory and imaging reviewed including primary images where applicable      Active Problems:    * No active hospital problems. *  Resolved Problems:    * No resolved hospital problems. *      Assessment / Plan:  79 y.o.-year-old patient here for elective transesophageal echocardiogram and left atrial appendage closure due to high bleeding risk and atrial fibrillation.   Risk benefits of the procedure discussed in detail, patient agrees to proceed        I personally discussed his case with  Dr Nubia Yoder M.D.    Future Appointments   Date Time Provider Department Center   2025   8:00 AM [facility] CATH LAB PORTABLE ECHO [facility]   1/16/2025  8:30 AM [facility] CATH LAB 2 CLOT None   2/13/2025  1:00 PM Jadiel Leary M.D. R[facility]N None   3/3/2025  1:00 PM [facility] EXAM 3 SAMARA [facility]   4/8/2025 10:15 AM [facility] MRI 1 [facility]   5/5/2025 11:00 AM Melvin Mccann D.O. ONCRMO None   7/14/2025  2:20 PM Taye VILLAFUERTE M.D. CARCB None       It is my pleasure to participate in the care of Mr. Roca.  Please do not hesitate to contact me with questions or concerns.    Jordan Yoder M.D.    1/16/2025

## 2025-01-17 ENCOUNTER — APPOINTMENT (OUTPATIENT)
Dept: CARDIOLOGY | Facility: MEDICAL CENTER | Age: 80
DRG: 274 | End: 2025-01-17
Attending: INTERNAL MEDICINE
Payer: MEDICARE

## 2025-01-17 ENCOUNTER — PHARMACY VISIT (OUTPATIENT)
Dept: PHARMACY | Facility: MEDICAL CENTER | Age: 80
End: 2025-01-17
Payer: COMMERCIAL

## 2025-01-17 ENCOUNTER — APPOINTMENT (OUTPATIENT)
Dept: RADIOLOGY | Facility: MEDICAL CENTER | Age: 80
DRG: 274 | End: 2025-01-17
Payer: MEDICARE

## 2025-01-17 VITALS
HEART RATE: 54 BPM | SYSTOLIC BLOOD PRESSURE: 119 MMHG | WEIGHT: 197.97 LBS | BODY MASS INDEX: 31.07 KG/M2 | TEMPERATURE: 98.2 F | RESPIRATION RATE: 19 BRPM | OXYGEN SATURATION: 96 % | HEIGHT: 67 IN | DIASTOLIC BLOOD PRESSURE: 62 MMHG

## 2025-01-17 LAB
ANION GAP SERPL CALC-SCNC: 11 MMOL/L (ref 7–16)
BUN SERPL-MCNC: 36 MG/DL (ref 8–22)
CALCIUM SERPL-MCNC: 8.9 MG/DL (ref 8.5–10.5)
CHLORIDE SERPL-SCNC: 106 MMOL/L (ref 96–112)
CO2 SERPL-SCNC: 21 MMOL/L (ref 20–33)
CREAT SERPL-MCNC: 1.75 MG/DL (ref 0.5–1.4)
GFR SERPLBLD CREATININE-BSD FMLA CKD-EPI: 39 ML/MIN/1.73 M 2
GLUCOSE BLD STRIP.AUTO-MCNC: 116 MG/DL (ref 65–99)
GLUCOSE SERPL-MCNC: 120 MG/DL (ref 65–99)
LV EJECT FRACT  99904: 65
POTASSIUM SERPL-SCNC: 4.9 MMOL/L (ref 3.6–5.5)
SODIUM SERPL-SCNC: 138 MMOL/L (ref 135–145)

## 2025-01-17 PROCEDURE — 93325 DOPPLER ECHO COLOR FLOW MAPG: CPT

## 2025-01-17 PROCEDURE — 80048 BASIC METABOLIC PNL TOTAL CA: CPT

## 2025-01-17 PROCEDURE — 93325 DOPPLER ECHO COLOR FLOW MAPG: CPT | Mod: 26 | Performed by: INTERNAL MEDICINE

## 2025-01-17 PROCEDURE — 82962 GLUCOSE BLOOD TEST: CPT

## 2025-01-17 PROCEDURE — A9270 NON-COVERED ITEM OR SERVICE: HCPCS | Performed by: INTERNAL MEDICINE

## 2025-01-17 PROCEDURE — RXMED WILLOW AMBULATORY MEDICATION CHARGE: Performed by: NURSE PRACTITIONER

## 2025-01-17 PROCEDURE — 700102 HCHG RX REV CODE 250 W/ 637 OVERRIDE(OP): Performed by: INTERNAL MEDICINE

## 2025-01-17 PROCEDURE — 93308 TTE F-UP OR LMTD: CPT | Mod: 26 | Performed by: INTERNAL MEDICINE

## 2025-01-17 PROCEDURE — 70450 CT HEAD/BRAIN W/O DYE: CPT

## 2025-01-17 RX ORDER — ASPIRIN 81 MG/1
81 TABLET ORAL DAILY
Qty: 90 TABLET | Refills: 0 | Status: SHIPPED | OUTPATIENT
Start: 2025-01-18

## 2025-01-17 RX ORDER — CLOPIDOGREL BISULFATE 75 MG/1
75 TABLET ORAL DAILY
Qty: 30 TABLET | Refills: 0 | Status: SHIPPED | OUTPATIENT
Start: 2025-01-18

## 2025-01-17 RX ADMIN — Medication 1000 UNITS: at 06:09

## 2025-01-17 RX ADMIN — CLOPIDOGREL BISULFATE 75 MG: 75 TABLET ORAL at 06:09

## 2025-01-17 RX ADMIN — ESCITALOPRAM OXALATE 10 MG: 10 TABLET ORAL at 06:09

## 2025-01-17 RX ADMIN — AMIODARONE HYDROCHLORIDE 200 MG: 200 TABLET ORAL at 06:09

## 2025-01-17 RX ADMIN — ASPIRIN 81 MG: 81 TABLET, COATED ORAL at 06:09

## 2025-01-17 ASSESSMENT — PAIN DESCRIPTION - PAIN TYPE: TYPE: ACUTE PAIN

## 2025-01-17 ASSESSMENT — FIBROSIS 4 INDEX: FIB4 SCORE: 1.92

## 2025-01-17 NOTE — CARE PLAN
The patient is Stable - Low risk of patient condition declining or worsening    Shift Goals  Clinical Goals: VSS, safety, pulse checks  Patient Goals: Rest + Go home  Family Goals: Updates    Progress made toward(s) clinical / shift goals:    Problem: Knowledge Deficit - Standard  Goal: Patient and family/care givers will demonstrate understanding of plan of care, disease process/condition, diagnostic tests and medications  Outcome: Progressing     Problem: Acute Care of the Cardiac Cath Patient  Goal: Post Procedure Optimal Outcome for the Cardiac Cath Patient  Outcome: Progressing     Problem: Fall Risk  Goal: Patient will remain free from falls  Outcome: Progressing       Patient is not progressing towards the following goals:

## 2025-01-17 NOTE — PROGRESS NOTES
Bedside report received and patient care assumed. Pt is resting in bed, A&Ox3, with no complaints of pain, and is on RA. Tele box on. All fall precautions are in place, belongings at bedside table.  Pt was updated on POC, no questions or concerns. Pt educated on use of call light for assistance.

## 2025-01-17 NOTE — DISCHARGE PLANNING
HTH/SCP TCN chart review completed.  The most current review of medical record, knowledge of pt's PLOF and social support, LACE+ score of 63, 6 clicks scores of 18 mobility were considered.      Pt seen at bedside. Introduced TCN program. Provided education regarding post acute levels of care. Education provided regarding case management policy for blanket SNF referrals. Discussed HTH/SCP plan benefits. Pt verbalizes understanding.     Patient reports no concerns with discharge home, reports he owns 4WW and FWW and has support from family as baseline; close to 24/7.  Patient is agreeable to HH, choice obtained.  HH choice discussed due to 6 click scores and secondary to RN note on 1/17 at 9:15am. Spouse questioned if there are any addition services for caregiving service.  TCN explained enhanced  and [facility]'s transitional care program for short term assistance; otherwise would need private CG services.  Spouse requested patient be considered for short term caregiving services if home healthcare is recommended by medical team.  Spouse also informed she can request CG list from  if she wishes to price out private options.  Spouse verbalized understanding.  No further TCN needs.     Choice proactively obtained for  and faxed to [facility]. Discharge considerations are anticipated to be home with home health and family assist with potential for CG needs. TCN will continue to follow and collaborate with discharge planning team as additional post acute needs arise. Thank you.     Completed today:  Choice obtained: HH (1. [facility] 2. [facility])  Pt aware of [facility]'s blanket referral policy  SCP with [facility] PCP.

## 2025-01-17 NOTE — DISCHARGE SUMMARY
Admission date    1/16/2025    Discharge date    1/17/25      Discharge diagnoses    -Atrial fibrillation with high bleeding risk status post successful left atrial appendage closure 1/16/2025.  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  -Not on OAC secondary to high risk of bleeding.  -Patient will be discharged on aspirin for 90 days and Plavix for 30 days post Watchman procedure.          Procedures : 1/16/25    Device implanted:  Size  31 mm  LOT# [id number]   Max appendage pre-measurement 21 mm but was bigger in fluoro measurements  Max device measurement 27 mm (11-25%) compression     Impressions:    1. Successful left atrial appendage closure      HPI/Hospital course    79-year-old presented 1/16/2025 for elective transesophageal echo and left atrial appendage closure secondary to high bleeding risk and atrial fibrillation.    01/16/2025 successful left atrial appendage closure.    1/17/25 no overnight cardiac events.  S/p nontraumatic fall in the bathroom.  CT head/17/25 no evidence of hemorrhage.  Right groin cath site without evidence of hematoma.  Ambulated without difficulty.  Stable for discharge.      Labs reviewed    WBC 10.8, hemoglobin 11, hematocrit 34, platelet count 178, sodium 138, potassium 4.9, serum creatinine 1.75      Discharge meds       Medication List        START taking these medications        Instructions   aspirin 81 MG EC tablet  Start taking on: January 18, 2025   Take 1 Tablet by mouth every day.  Dose: 81 mg     clopidogrel 75 MG Tabs  Start taking on: January 18, 2025  Commonly known as: Plavix   Take 1 Tablet by mouth every day.  Dose: 75 mg            CONTINUE taking these medications        Instructions   amiodarone 200 MG Tabs  Commonly known as: Cordarone   TAKE 1 TABLET BY MOUTH EVERY DAY  Dose: 200 mg     B-12 1000 MCG Tabs   Take  by mouth every day.     escitalopram 10 MG Tabs  Commonly known as: Lexapro   Take 10 mg by mouth every  day. Indications: Generalized Anxiety Disorder  Dose: 10 mg     * OCUVITE PO   Take 1 Capsule by mouth every day. Indications: Nutritional supplement  Dose: 1 Capsule     * therapeutic multivitamin-minerals Tabs   Take 1 Tablet by mouth every day.  Dose: 1 Tablet     simvastatin 20 MG Tabs  Commonly known as: Zocor   Take 1 Tab by mouth every evening.  Dose: 20 mg     terazosin 10 MG capsule  Commonly known as: Hytrin   Take 10 mg by mouth every evening. Indications: Benign Enlargement of Prostate  Dose: 10 mg     Vitamin C 1000 MG Tabs   Take  by mouth every day.     Vitamin D (Cholecalciferol) 25 MCG (1000 UT) Tabs  Commonly known as: Cholecalciferol   Take 1,000 Units by mouth every day.  Dose: 1,000 Units           * This list has 2 medication(s) that are the same as other medications prescribed for you. Read the directions carefully, and ask your doctor or other care provider to review them with you.                   Discharge instructions    Patient will be discharged home on aspirin 81 mg  and Plavix, discussed aspirin for 90 days and Plavix 75 mg  for 30 days post Watchman procedure.    Follow-up appointment for SAMARA in 45 days scheduled for 3/3/2025 at 1 PM.    Follow-up appointment in the cardiology office scheduled for 3/6/2025 at 7:15 AM.    [facility] POST WATCHMAN INSTRUCTIONS  No lifting > 10 lbs x 1 week.  No baths or hot tubs x 1 week.      May shower and take off groin dressing and leave site uncovered.  Please try to keep Steri-strip in place and allow to come off on its own.  Continue to monitor site daily for warmth, redness, discolored drainage.    3. Please do not miss any doses of your blood thinner.      4. Please keep all follow up appointments scheduled for you.  You are scheduled for a wound check appointment in one week.  You are also scheduled for procedure follow up with EP clinic.     5. A transesophageal echocardiogram (SAMARA) will be scheduled for you  to check the healing of the Watchman  in 45 days.  This procedure is completed at [facility] same day procedures.  We use sedation/anesthesia for this procedure so you will need a .     6. Please walk and take deep breaths after discharge.  After discharge, if you experience severe chest pain, shortness of breath, neurological changes, high fever, severe dizziness, trouble with catheter site needs to be seen in the emergency dept.

## 2025-01-17 NOTE — PROGRESS NOTES
Staff assist called and entered room to find patient with CNA in the bathroom on the floor. CNA states patient slipped on urine and hit the back of their head on the walker while falling to the floor. Blood sugar taken was 116. Vital signs showed blood pressure of 99/54. Patient denies any pain, no signs of bleeding or bruising on back of head. Patient denies any dizziness. Patient assisted to wheelchair and placed back in bed. MD informed and CT scan of head ordered.

## 2025-01-17 NOTE — PROGRESS NOTES
Monitor Summary  Rhythm: SB/SR  Rate: 58-74  Ectopy: none  Measurements: .22/.08/.42  ---12 hr Chart Review---

## 2025-01-17 NOTE — PROGRESS NOTES
Discharge orders received.  Patient arrived to the discharge lounge.  PIV removed.  Instructions given, medications reviewed and general discharge education provided to patient.  Follow up appointments discussed.  Patient verbalized understanding of dc instructions and prescriptions.  Patient signed discharge instructions.  Patient verbalized understanding had all belongings with him.  Patient pending meds to beds. Wished patient a speedy recovery.

## 2025-02-05 ENCOUNTER — APPOINTMENT (OUTPATIENT)
Dept: ADMISSIONS | Facility: MEDICAL CENTER | Age: 80
End: 2025-02-05
Attending: INTERNAL MEDICINE
Payer: MEDICARE

## 2025-02-11 ENCOUNTER — PRE-ADMISSION TESTING (OUTPATIENT)
Dept: ADMISSIONS | Facility: MEDICAL CENTER | Age: 80
End: 2025-02-11
Attending: INTERNAL MEDICINE
Payer: MEDICARE

## 2025-02-11 NOTE — PREADMIT AVS NOTE
Current Medications   Medication Instructions    aspirin 81 MG EC tablet Continue taking medication as prescribed, per cardiology    clopidogrel (PLAVIX) 75 MG Tab Follow instructions from surgeon or specialist.    amiodarone (CORDARONE) 200 MG Tab Continue taking medication as prescribed    therapeutic multivitamin-minerals (THERAGRAN-M) Tab Stop 7 days before surgery    Cyanocobalamin (B-12) 1000 MCG Tab Stop 7 days before surgery    Ascorbic Acid (VITAMIN C) 1000 MG Tab Stop 7 days before surgery    Vitamin D, Cholecalciferol, (CHOLECALCIFEROL) 25 MCG (1000 UT) Tab Stop 7 days before surgery    escitalopram (LEXAPRO) 10 MG Tab Continue taking medication as prescribed    Multiple Vitamins-Minerals (OCUVITE PO) Stop 7 days before surgery    terazosin (HYTRIN) 10 MG capsule Continue taking medication as prescribed    simvastatin (ZOCOR) 20 MG Tab Continue taking medication as prescribed

## 2025-02-11 NOTE — PREPROCEDURE INSTRUCTIONS
PAT done with pt's wife, Joanna per pt request.    Medication instructions and fasting guidelines given per cardiology/AP. Patient's wife verbalized understanding of instructions.     Patient's wife has no further questions at this time.

## 2025-02-13 ENCOUNTER — OFFICE VISIT (OUTPATIENT)
Dept: NEUROLOGY | Facility: MEDICAL CENTER | Age: 80
End: 2025-02-13
Attending: PSYCHIATRY & NEUROLOGY
Payer: MEDICARE

## 2025-02-13 VITALS
WEIGHT: 196.87 LBS | RESPIRATION RATE: 16 BRPM | OXYGEN SATURATION: 97 % | TEMPERATURE: 97.5 F | SYSTOLIC BLOOD PRESSURE: 104 MMHG | DIASTOLIC BLOOD PRESSURE: 62 MMHG | BODY MASS INDEX: 30.9 KG/M2 | HEART RATE: 67 BPM | HEIGHT: 67 IN

## 2025-02-13 DIAGNOSIS — F03.B0 MODERATE DEMENTIA WITHOUT BEHAVIORAL DISTURBANCE, PSYCHOTIC DISTURBANCE, MOOD DISTURBANCE, OR ANXIETY, UNSPECIFIED DEMENTIA TYPE (HCC): Primary | ICD-10-CM

## 2025-02-13 PROCEDURE — 3078F DIAST BP <80 MM HG: CPT | Performed by: PSYCHIATRY & NEUROLOGY

## 2025-02-13 PROCEDURE — 99215 OFFICE O/P EST HI 40 MIN: CPT | Performed by: PSYCHIATRY & NEUROLOGY

## 2025-02-13 PROCEDURE — 3074F SYST BP LT 130 MM HG: CPT | Performed by: PSYCHIATRY & NEUROLOGY

## 2025-02-13 PROCEDURE — 99212 OFFICE O/P EST SF 10 MIN: CPT | Performed by: PSYCHIATRY & NEUROLOGY

## 2025-02-13 PROCEDURE — 99417 PROLNG OP E/M EACH 15 MIN: CPT | Performed by: PSYCHIATRY & NEUROLOGY

## 2025-02-13 ASSESSMENT — MONTREAL COGNITIVE ASSESSMENT (MOCA)
4. NAME EACH OF THE THREE ANIMALS SHOWN: 1/3
9. REPEAT EACH SENTENCE: 0/2
6. READ LIST OF DIGITS [FORWARD/BACKWARD]: 2/2
2. COPY DRAWING: 0/1
ADD 1 POINT IF LESS THAN OR EQUAL TO 12 YR EDUCATION LEVEL: 0
CATEGORY CUE (IF APPLICABLE): 0
11. FOR EACH PAIR OF WORDS, WHAT CATEGORY DO THEY BELONG TO (OUT OF 2): 0/2
WHAT IS THE VERSION OF MOCA ADMINISTERED: 8.1
7. [VIGILENCE] TAP WHEN HEARING DESIGNATED LETTER: 0/1
10. [FLUENCY] NAME WORDS STARTING WITH DESIGNATED LETTER: 0/1
WHAT IS THE TOTAL SCORE (OUT OF 30): 7
8. SERIAL SUBTRACTION OF 7S: 0/5
CATEGORY CUE (IF APPLICABLE): 1
1. ALTERNATING TRAIL MAKING: 0/1
DELAYED RECALL SUBSCORE: 0/5
ORIENTATION SUBSCORE: 3/6
3. DRAW A CLOCK: CONTOUR, NUMBERS, HANDS: 1/3

## 2025-02-13 ASSESSMENT — PATIENT HEALTH QUESTIONNAIRE - PHQ9
CLINICAL INTERPRETATION OF PHQ2 SCORE: 1
SUM OF ALL RESPONSES TO PHQ QUESTIONS 1-9: 3
5. POOR APPETITE OR OVEREATING: 0 - NOT AT ALL

## 2025-02-13 ASSESSMENT — ENCOUNTER SYMPTOMS
SEIZURES: 0
MEMORY LOSS: 1
CONSTIPATION: 0
INSOMNIA: 0
LOSS OF CONSCIOUSNESS: 0
DEPRESSION: 0
TREMORS: 1

## 2025-02-13 ASSESSMENT — FIBROSIS 4 INDEX: FIB4 SCORE: 1.92

## 2025-02-13 NOTE — Clinical Note
Heena, I would be interested to know what your impressions are of this gentleman.  He seems to have both subcortical as well as cortical structures involved with his cognitive impairment/dementia.  The deficits he has are quite significant and involve multiple cognitive domains, especially temporal and frontal.

## 2025-02-14 NOTE — ASSESSMENT & PLAN NOTE
This gentleman suffers from dementia, moderate in severity, the deficits showing interference with normal function.  The issue is that there are several possible contributing factors.  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  He may also be having seizures occur, he has risk given the superficial siderosis on imaging, and even though routine EEG study was unremarkable, more prolonged study probably would be more sensitive.    The other possibility is that a cortical process such as frontotemporal disease or Alzheimer's disease could be contributing.  He lacks stigmata suggestive of Lewy body disease or Parkinson's disease.  Imaging is ruled out NPH.  This is not related to severe depression.  Given the protracted history, I doubt that this is an autoimmune, infectious or paraneoplastic process.    The freezing episodes he seems to be suffering from may in fact be related to hypotension and nothing more.  There is rapid recovery, they tend to occur while he is standing and walking, and despite his insistence of the contrary, review of records indicates that he does complain of dizziness when some of these events occurred.  I do not think it is related to EPS diseases such as Parkinson's or LBD.    For now, we can simply observe as we gather more data.  We will contact them as the results become available, we will follow-up in the next several months.    Orders:    Referral to Behavioral Health    Referral to Neurodiagnostics (EEG,EP,EMG/NCS/DBS)    SPEP W/REFLEX TO ERNA, A, G, M; Future    Sed Rate; Future    VIT B12,  FOLIC ACID

## 2025-02-14 NOTE — PROGRESS NOTES
Subjective     Taye Roca is a 79 y.o. 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  The history is gotten from review of records as well as discussions with the patient and family.      HPI    Over the last couple of years, especially since April of last year, when he was seen in the hospital for an event of dizziness associated with altered sensorium, there has been a slow and steady deterioration in cognitive status.  Short-term memory function has been curtailed, he repeats himself frequently, frequent reminders from others always required about recent events.  Most noticeable is language deficits.  He has great difficulty with word finding, even conversation itself is proving problematic for him.  He has difficulty keeping pace.  Every morning when he gets up and goes into the kitchen, there is a two-step raise, and they have to go through the same thing of teaching him how to do this using his walker appropriately.    They have also noted that he has become much less interested in doing most everything.  He does enjoy himself when he is with others, he simply does not initiate activities on his own like he once did.  He will eat but then simply sits and watches TV.  Even when prompted to do other things, it is like pulling teeth.  It is not clear whether he is simply slower and learning new things or it is a disinterest that prevents him from doing so.  He tends to give up very easily as things become more complex.  He does require some assistance when getting bathed, though this is mostly related to balance.  He chooses close, he actually smells them to make sure that they are clean.  Shaving, eating, etc. are all done without issue.    Bowel and bladder functions are maintained.  Balance remains severely curtailed, he uses his walker  most of the time, but there are always reminders about how to use it and when to do so.  He has not had problems with loss of taste or smell, he does eat easily, and with gusto.  Medications are now monitored by his wife, mostly because he simply has lost interest in doing so on his own.    Behaviors otherwise have been undisturbed.  There have been no problems with severe mood distortions, sustained depression, SI, HI, hallucinations, etc.  He has not had problems with agitated delirium in the afternoons.  He has had no events of loss of consciousness.    He sleeps the night through, they deny any history of PLMD, ARIEL, or RBD.  He does not nap during the daytime.    He still has recurrent episodes of freezing while walking.  Review of records indicates that in the past, including the event in April of last year, he was complaining of dizziness and was found to be hypotensive.  Emergency room evaluations included MRI of the brain which revealed the right frontal and parietal superficial siderosis and an advanced degree of atherosclerotic change but no acute ischemia.  Subsequent event in October was again evaluated in the emergency room, he was hypotensive once again, complaining of dizziness, MRA of the brain revealed a new left frontal lobar hemorrhage along with the right frontal and parietal superficial siderosis, no vascular anomalies with AVM or underlying aneurysmal dilatation or vasculitis.  He has had these events recur seemingly random, at times on consecutive days, though he can be free of them for weeks at a time, and he recovers very quickly after just a few minutes.  He is awake and alert, he denies any other premonitory symptoms. He does not collapse.    They have begun to notice a tremulousness involving the right hand as well as in both legs in the evenings as he is getting into bed.  This is not something that happens when he has his freezing episodes.    Since his last office visit with me in  "November of last year, he did undergo repeat MRI scan of the brain as well as EEG study.  The latter was normal, the former revealed the sequelae of the left frontal parenchymal hemorrhage with hemosiderin deposition seen in October, but now with bifrontal superficial siderosis over the convexities, as well as bilateral parietal superficial siderosis.  There was an acute area of cortical infarct in the right frontal convexity.  The advanced chronic white matter ischemic changes were unchanged.    He did undergo Watchman procedure and has been off Eliquis, now on aspirin and Plavix, scheduled to be off the ladder in a matter of weeks, the former in about 2 months.  He is also scheduled for repeat scan to evaluate the status of the Watchman.    Medical, surgical and family histories are reviewed.  No one in the family has a history of dementia, though his maternal grandfather did suffer from some type of intracranial hemorrhage.    He is on Cordarone, baby aspirin, Plavix, Lexapro, Zocor, Hytrin, vitamin C, vitamin B12, vitamin D and a multivitamin.    Review of Systems   Gastrointestinal:  Negative for constipation.   Neurological:  Positive for tremors. Negative for seizures and loss of consciousness.   Psychiatric/Behavioral:  Positive for memory loss. Negative for depression and suicidal ideas. The patient does not have insomnia.    All other systems reviewed and are negative.    Objective     /62 (BP Location: Left arm, Patient Position: Sitting, BP Cuff Size: Adult)   Pulse 67   Temp 36.4 °C (97.5 °F) (Temporal)   Resp 16   Ht 1.702 m (5' 7\")   Wt 89.3 kg (196 lb 13.9 oz)   SpO2 97%   BMI 30.83 kg/m²      Physical Exam    He appears in no acute distress.  He is clean and appropriately dressed.  He does interact easily with the examiner, he is quite cooperative.  Vital signs are stable, rhythm is irregular.  There is no malar rash or jaw claudication.  The neck is supple.  Cardiac evaluation is " otherwise unremarkable.     Neurological Exam    He is partially oriented only to the month as well as our location and city.  MoCA is 7/30.  Throughout the interview he has a tendency to give up easily, simply saying that he does not know the answer.  There is a paucity of spontaneous speech, speech complexity is diminished, there is clear reduction in fluency with word finding difficulties demonstrated.  There is significant visual-spatial distortion, he is field dependent.  Attention span is notably curtailed as is language along with memory encoding and delayed recall.  His mood is euthymic.  Affect is slightly blunted.  Frontal release signs are absent, there is rostrocaudal extinction on double simultaneous stimulation.  There is also some mild right/left confusion, but no inattention.  There is no apraxia.    PERRLA/EOMI visual fields are full to finger counting bilaterally.  There is no bradykinesia or hypophonia, the tongue and uvula are midline, there is no dysarthria.  Facial movements are symmetric, sensory exam is intact to temperature.  Shoulder shrug and head rotation are symmetric.    Musculoskeletal exam reveals normal tone throughout, very fine tremulousness is seen with the right hand with sustained posture against gravity only.  There is no asterixis.  Strength is intact in all 4 extremities.  Both toes are downgoing, there are no clear reflex asymmetries though the ankle jerks are diminished.    Gait is still distorted, there is an apractic quality as he moves, stride length is thus shortened.  Station is increased.  There is no appendicular dystaxia.  Repetitive movements are symmetric in amplitude and frequency.    Sensory exam reveals a slight stocking decrement of vibration in the feet.  Assessment & Plan  Moderate dementia without behavioral disturbance, psychotic disturbance, mood disturbance, or anxiety, unspecified dementia type (HCC)  This gentleman suffers from dementia, moderate in  severity, the deficits showing interference with normal function.  The issue is that there are several possible contributing factors.  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  He may also be having seizures occur, he has risk given the superficial siderosis on imaging, and even though routine EEG study was unremarkable, more prolonged study probably would be more sensitive.    The other possibility is that a cortical process such as frontotemporal disease or Alzheimer's disease could be contributing.  He lacks stigmata suggestive of Lewy body disease or Parkinson's disease.  Imaging is ruled out NPH.  This is not related to severe depression.  Given the protracted history, I doubt that this is an autoimmune, infectious or paraneoplastic process.    The freezing episodes he seems to be suffering from may in fact be related to hypotension and nothing more.  There is rapid recovery, they tend to occur while he is standing and walking, and despite his insistence of the contrary, review of records indicates that he does complain of dizziness when some of these events occurred.  I do not think it is related to EPS diseases such as Parkinson's or LBD.    For now, we can simply observe as we gather more data.  We will contact them as the results become available, we will follow-up in the next several months.    Orders:    Referral to Behavioral Health    Referral to Neurodiagnostics (EEG,EP,EMG/NCS/DBS)    SPEP W/REFLEX TO ERNA, A, G, M; Future    Sed Rate; Future    VIT B12,  FOLIC ACID    Time: 60 minutes in total spent on patient care including pre-charting, record review, discussion with healthcare staff and documentation.  This includes face-to-face time for exam, review, discussion, as well as counseling and coordinating  care.

## 2025-02-18 NOTE — Clinical Note
REFERRAL APPROVAL NOTICE         Sent on February 18, 2025                   Taye Roca  [address] [place] [address]                   Dear Mr. Roca,    After a careful review of the medical information and benefit coverage, [facility] has processed your referral. See below for additional details.    If applicable, you must be actively enrolled with your insurance for coverage of the authorized service. If you have any questions regarding your coverage, please contact your insurance directly.    REFERRAL INFORMATION   Referral #:  [id number]  Referred-To Department    Referred-By Provider:  Behavioral Health    Jadiel Leary M.D.   Behavioral Health Outpatient      [address]  [place] [id number] [phone] [address]  [place] [id number] [phone]    Referral Start Date:  02/13/2025  Referral End Date:   02/13/2026             SCHEDULING  If you do not already have an appointment, please call [phone] to make an appointment.     MORE INFORMATION  If you do not already have a [employer] account, sign up at: [email or web address]  You can access your medical information, make appointments, see lab results, billing information, and more.  If you have questions regarding this referral, please contact  the [facility] Referrals department at:             [phone]. Monday - Friday 8:00AM - 5:00PM.     Sincerely,    [facility]

## 2025-02-19 ENCOUNTER — HOSPITAL ENCOUNTER (OUTPATIENT)
Facility: MEDICAL CENTER | Age: 80
End: 2025-02-19
Attending: PSYCHIATRY & NEUROLOGY
Payer: MEDICARE

## 2025-02-19 DIAGNOSIS — F03.B0 MODERATE DEMENTIA WITHOUT BEHAVIORAL DISTURBANCE, PSYCHOTIC DISTURBANCE, MOOD DISTURBANCE, OR ANXIETY, UNSPECIFIED DEMENTIA TYPE (HCC): ICD-10-CM

## 2025-02-19 DIAGNOSIS — Z86.79 H/O SPONTANEOUS INTRAPARENCHYMAL INTRACRANIAL HEMORRHAGE: ICD-10-CM

## 2025-02-19 DIAGNOSIS — J63.4 SUPERFICIAL SIDEROSIS PRESENT ON MAGNETIC RESONANCE IMAGING (HCC): ICD-10-CM

## 2025-02-19 LAB
ERYTHROCYTE [SEDIMENTATION RATE] IN BLOOD BY WESTERGREN METHOD: 4 MM/HOUR (ref 0–20)
VIT B12 SERPL-MCNC: 845 PG/ML (ref 211–911)

## 2025-02-19 PROCEDURE — 82607 VITAMIN B-12: CPT

## 2025-02-19 PROCEDURE — 85652 RBC SED RATE AUTOMATED: CPT

## 2025-02-19 PROCEDURE — 84165 PROTEIN E-PHORESIS SERUM: CPT

## 2025-02-19 PROCEDURE — 36415 COLL VENOUS BLD VENIPUNCTURE: CPT

## 2025-02-19 PROCEDURE — 84155 ASSAY OF PROTEIN SERUM: CPT

## 2025-02-19 PROCEDURE — 82746 ASSAY OF FOLIC ACID SERUM: CPT

## 2025-02-21 LAB — FOLATE SERPL-MCNC: 23.6 NG/ML

## 2025-02-22 LAB
ALBUMIN SERPL ELPH-MCNC: 4.25 G/DL (ref 3.75–5.01)
ALPHA1 GLOB SERPL ELPH-MCNC: 0.29 G/DL (ref 0.19–0.46)
ALPHA2 GLOB SERPL ELPH-MCNC: 0.56 G/DL (ref 0.48–1.05)
B-GLOBULIN SERPL ELPH-MCNC: 0.7 G/DL (ref 0.48–1.1)
GAMMA GLOB SERPL ELPH-MCNC: 0.7 G/DL (ref 0.62–1.51)
INTERPRETATION SERPL IFE-IMP: NORMAL
MONOCLON BAND OBS SERPL: NORMAL
MONOCLONAL PROTEIN NL11656: NORMAL G/DL
PATHOLOGY STUDY: NORMAL
PROT SERPL-MCNC: 6.5 G/DL (ref 6.3–8.2)

## 2025-03-03 ENCOUNTER — APPOINTMENT (OUTPATIENT)
Dept: CARDIOLOGY | Facility: MEDICAL CENTER | Age: 80
End: 2025-03-03
Attending: INTERNAL MEDICINE
Payer: MEDICARE

## 2025-03-03 ENCOUNTER — HOSPITAL ENCOUNTER (OUTPATIENT)
Facility: MEDICAL CENTER | Age: 80
End: 2025-03-03
Attending: INTERNAL MEDICINE | Admitting: INTERNAL MEDICINE
Payer: MEDICARE

## 2025-03-03 VITALS
RESPIRATION RATE: 16 BRPM | SYSTOLIC BLOOD PRESSURE: 111 MMHG | HEIGHT: 69 IN | BODY MASS INDEX: 29.42 KG/M2 | OXYGEN SATURATION: 99 % | TEMPERATURE: 97.2 F | HEART RATE: 54 BPM | WEIGHT: 198.63 LBS | DIASTOLIC BLOOD PRESSURE: 57 MMHG

## 2025-03-03 DIAGNOSIS — I48.92 ATRIAL FLUTTER, UNSPECIFIED TYPE (HCC): ICD-10-CM

## 2025-03-03 LAB — LV EJECT FRACT  99904: 60

## 2025-03-03 PROCEDURE — 99152 MOD SED SAME PHYS/QHP 5/>YRS: CPT | Mod: 59 | Performed by: INTERNAL MEDICINE

## 2025-03-03 PROCEDURE — 160015 HCHG STAT PREOP MINUTES

## 2025-03-03 PROCEDURE — 160002 HCHG RECOVERY MINUTES (STAT)

## 2025-03-03 PROCEDURE — 93325 DOPPLER ECHO COLOR FLOW MAPG: CPT

## 2025-03-03 PROCEDURE — 93312 ECHO TRANSESOPHAGEAL: CPT | Mod: 26 | Performed by: INTERNAL MEDICINE

## 2025-03-03 PROCEDURE — 700111 HCHG RX REV CODE 636 W/ 250 OVERRIDE (IP): Mod: JZ | Performed by: INTERNAL MEDICINE

## 2025-03-03 PROCEDURE — 160035 HCHG PACU - 1ST 60 MINS PHASE I

## 2025-03-03 PROCEDURE — 160046 HCHG PACU - 1ST 60 MINS PHASE II

## 2025-03-03 RX ORDER — SODIUM CHLORIDE 9 MG/ML
500 INJECTION, SOLUTION INTRAVENOUS
Status: DISCONTINUED | OUTPATIENT
Start: 2025-03-03 | End: 2025-03-03 | Stop reason: HOSPADM

## 2025-03-03 RX ORDER — MIDAZOLAM HYDROCHLORIDE 1 MG/ML
.5-2 INJECTION INTRAMUSCULAR; INTRAVENOUS PRN
Status: DISCONTINUED | OUTPATIENT
Start: 2025-03-03 | End: 2025-03-03 | Stop reason: HOSPADM

## 2025-03-03 RX ADMIN — MIDAZOLAM HYDROCHLORIDE 2 MG: 1 INJECTION, SOLUTION INTRAMUSCULAR; INTRAVENOUS at 12:42

## 2025-03-03 RX ADMIN — MIDAZOLAM HYDROCHLORIDE 0.5 MG: 1 INJECTION, SOLUTION INTRAMUSCULAR; INTRAVENOUS at 12:43

## 2025-03-03 RX ADMIN — FENTANYL CITRATE 50 MCG: 50 INJECTION, SOLUTION INTRAMUSCULAR; INTRAVENOUS at 12:42

## 2025-03-03 RX ADMIN — FENTANYL CITRATE 25 MCG: 50 INJECTION, SOLUTION INTRAMUSCULAR; INTRAVENOUS at 12:44

## 2025-03-03 ASSESSMENT — PAIN DESCRIPTION - PAIN TYPE
TYPE: SURGICAL PAIN

## 2025-03-03 ASSESSMENT — FIBROSIS 4 INDEX: FIB4 SCORE: 1.92

## 2025-03-03 NOTE — PROCEDURES
SAMARA performed- Watchman device is stable, no leak, no thrombus on device.  I supervised moderate sedation or a trained independent nursing staff, sedation start time 1241, end time 1252  A total of 2.5 mg of Versed, 75 mcg of fentanyl was given.

## 2025-03-03 NOTE — DISCHARGE INSTRUCTIONS
Discharge Instructions:    Stop plavix today.    Continue Aspirin until instructed otherwise by the doctor.     Transesophageal Echocardiogram (SAMARA)    SAMARA is a test that uses sound waves to take pictures of your heart. SAMARA is done by passing a small probe attached to a flexible tube down the part of the body that moves food from your mouth to your stomach (esophagus).    The pictures give clear images of your heart. This can help your doctor see if there are problems with your heart.   General instructions    Follow instructions from your doctor about what you cannot eat or drink.   You will take out any dentures or dental retainers.   Plan to have a responsible adult take you home from the hospital or clinic.   Plan to have a responsible adult care for you for the time you are told after you leave the hospital or clinic. This is important.  What can I expect after the procedure?    You will be monitored until you leave the hospital or clinic. This includes checking your blood pressure, heart rate, breathing rate, and blood oxygen level.   Your throat may feel sore and numb. This will get better over time. You will not be allowed to eat or drink until the numbness has gone away.   It is common to have a sore throat for a day or two.   It is up to you to get the results of your procedure. Ask how to get your results when they are ready.   Pink tinge sputum is common after your procedure  Follow these instructions at home:    If you were given a sedative during your procedure, do not drive or use machines until your doctor says that it is safe.   Return to your normal activities when your doctor says that it is safe.   Keep all follow-up visits.  Go to ER / call 911:   If you cough up bright red blood or vomit blood   If you have severe pain in throat/stomach.   If you have difficulty breathing that does not go away with rest  Summary    SAMARA is a test that uses sound waves to take pictures of your heart.   You will  be given a medicine to help you relax.   Pink tinge sputum is common after your procedure

## 2025-03-03 NOTE — H&P
HPI:  79 y.o.-year-old patient referred for transesophageal echocardiogram for evaluation of watchman device.    Medications / Drug list prior to admission:  No current facility-administered medications on file prior to encounter.     Current Outpatient Medications on File Prior to Encounter   Medication Sig Dispense Refill    aspirin 81 MG EC tablet Take 1 Tablet by mouth every day. 90 Tablet 0    escitalopram (LEXAPRO) 10 MG Tab Take 10 mg by mouth every day. Indications: Generalized Anxiety Disorder      Multiple Vitamins-Minerals (OCUVITE PO) Take 1 Capsule by mouth every day. Indications: Nutritional supplement      terazosin (HYTRIN) 10 MG capsule Take 10 mg by mouth every evening. Indications: Benign Enlargement of Prostate      simvastatin (ZOCOR) 20 MG Tab Take 1 Tab by mouth every evening. 30 Tab 11    clopidogrel (PLAVIX) 75 MG Tab Take 1 Tablet by mouth every day. (Patient not taking: Reported on 3/3/2025) 30 Tablet 0       Current list of administered Medications:  No current facility-administered medications for this encounter.    Past Medical History:   Diagnosis Date    Atrial fibrillation (HCC) 2024    Atrial flutter (HCC)     BPH (benign prostatic hypertrophy)     CATARACT 2006, 2007    both done, one at a time    Hemorrhagic disorder (HCC) 10/2024    brain bleed    High cholesterol     HLD (hyperlipidemia)     Hypertension     no medication    Thalassemia     Unspecified urinary incontinence     prostate enlargement, per patient       Past Surgical History:   Procedure Laterality Date    RECOVERY  11/23/2015    Procedure: CATH LAB-CARDIOVERSION-KOZTOWSKI-ANESTHESIA-ICD 10: I48.91;  Surgeon: [facility];  Location: SURGERY PRE-POST PROC UNIT [place];  Service:     APPENDECTOMY  2010    INGUINAL HERNIA REPAIR  10/28/2009    Performed by ANGELI CHOI at SURGERY [facility]    RETINAL DETACHMENT REPAIR Right 1998    OTHER  01/01/1982    tonsillectomy    CATARACT EXTRACTION WITH IOL       "bilateral 2006.2007       Family History   Problem Relation Age of Onset    Heart Disease Neg Hx     Heart Failure Neg Hx      Patient family history was personally reviewed, no pertinent family history to current presentation    Social History     Tobacco Use    Smoking status: Never    Smokeless tobacco: Never   Vaping Use    Vaping status: Never Used   Substance Use Topics    Alcohol use: Not Currently     Comment: rarely    Drug use: No       ALLERGIES:  No Known Allergies    Review of systems:  A complete review of symptoms was reviewed with patient. This is reviewed in H&P and PMH. ALL OTHERS reviewed and negative    Physical exam:  Patient Vitals for the past 24 hrs:   BP Temp Temp src Pulse Resp SpO2 Height Weight   03/03/25 1055 117/62 36.2 °C (97.2 °F) Temporal 63 16 95 % 1.753 m (5' 9\") 90.1 kg (198 lb 10.2 oz)     General: No acute distress.   EYES: no jaundice  HEENT: OP clear   Neck:  No JVD.   CVS:  Regular  Resp: Normal respiratory effort, CTAB. No wheezing or crackles/rhonchi.        Data:  Laboratory studies personally reviewed by me:  No results found for this or any previous visit (from the past 24 hours).    Imaging:  EC-SAMARA W/O CONT    (Results Pending)         Pertinent cardiac testing, EKG, echocardiogram, prior angiogram films if any reviewed    All pertinent features of laboratory and imaging reviewed including primary images where applicable      Active Problems:    * No active hospital problems. *  Resolved Problems:    * No resolved hospital problems. *      Assessment / Plan:  79 y.o.-year-old patient here for transesophageal echo  Risk benefits of the procedure discussed in detail, patient agrees to proceed        I personally discussed his case with  Dr Jordan Yoder M.D.    Future Appointments   Date Time Provider Department Center   3/3/2025  1:00 PM [facility] EXAM 3 SAMARA [facility]   3/19/2025 10:00 AM MAIN LAB [facility]EL [facility]   4/1/2025  2:45 PM Tenisha Mcgee, " PAZ CAR[facility] None   4/8/2025 10:15 AM DENIA TORRES MRI 1 [facility]   5/5/2025 11:00 AM Melvin Mccann D.O. ONCRMO None   5/30/2025 11:20 AM Jadiel Leary M.D. [facility] None   7/14/2025  2:20 PM Taye VILLAFUERTE M.D. CARCB None       It is my pleasure to participate in the care of Mr. Roca.  Please do not hesitate to contact me with questions or concerns.    Jordan Yoder M.D.    3/3/2025

## 2025-03-03 NOTE — OR NURSING
1302 Arrived from cath lab ID verified. Report received attached to monitors. 6L 02 mask respirations even and unlabored. Vss.     1330 wife at the bedside updated plan of care.     1336 Tolerating PO fluids.     1346 discharge instructions given to patient and family verbalize understanding. Copy of instructions given to patients. Wife.     1352 Escorted via w/c to responsible adult with all personal belonging.

## 2025-03-11 ENCOUNTER — RESULTS FOLLOW-UP (OUTPATIENT)
Dept: CARDIOLOGY | Facility: MEDICAL CENTER | Age: 80
End: 2025-03-11
Payer: MEDICARE

## 2025-03-19 ENCOUNTER — RESULTS FOLLOW-UP (OUTPATIENT)
Dept: CARDIOLOGY | Facility: MEDICAL CENTER | Age: 80
End: 2025-03-19

## 2025-03-19 ENCOUNTER — HOSPITAL ENCOUNTER (OUTPATIENT)
Facility: MEDICAL CENTER | Age: 80
End: 2025-03-19
Attending: PSYCHIATRY & NEUROLOGY
Payer: MEDICARE

## 2025-03-19 DIAGNOSIS — Z79.899 LONG TERM CURRENT USE OF AMIODARONE: ICD-10-CM

## 2025-03-19 DIAGNOSIS — I48.92 PAROXYSMAL ATRIAL FLUTTER (HCC): ICD-10-CM

## 2025-03-19 LAB
ALBUMIN SERPL BCP-MCNC: 4.2 G/DL (ref 3.2–4.9)
ALBUMIN/GLOB SERPL: 2 G/DL
ALP SERPL-CCNC: 35 U/L (ref 30–99)
ALT SERPL-CCNC: 25 U/L (ref 2–50)
ANION GAP SERPL CALC-SCNC: 12 MMOL/L (ref 7–16)
AST SERPL-CCNC: 22 U/L (ref 12–45)
BILIRUB SERPL-MCNC: 0.7 MG/DL (ref 0.1–1.5)
BUN SERPL-MCNC: 35 MG/DL (ref 8–22)
CALCIUM ALBUM COR SERPL-MCNC: 9.1 MG/DL (ref 8.5–10.5)
CALCIUM SERPL-MCNC: 9.3 MG/DL (ref 8.4–10.2)
CHLORIDE SERPL-SCNC: 108 MMOL/L (ref 96–112)
CO2 SERPL-SCNC: 19 MMOL/L (ref 20–33)
CREAT SERPL-MCNC: 1.67 MG/DL (ref 0.5–1.4)
FASTING STATUS PATIENT QL REPORTED: NORMAL
GFR SERPLBLD CREATININE-BSD FMLA CKD-EPI: 41 ML/MIN/1.73 M 2
GLOBULIN SER CALC-MCNC: 2.1 G/DL (ref 1.9–3.5)
GLUCOSE SERPL-MCNC: 100 MG/DL (ref 65–99)
POTASSIUM SERPL-SCNC: 4.3 MMOL/L (ref 3.6–5.5)
PROT SERPL-MCNC: 6.3 G/DL (ref 6–8.2)
SODIUM SERPL-SCNC: 139 MMOL/L (ref 135–145)
TSH SERPL DL<=0.005 MIU/L-ACNC: 4 UIU/ML (ref 0.38–5.33)

## 2025-03-19 PROCEDURE — 84443 ASSAY THYROID STIM HORMONE: CPT

## 2025-03-19 PROCEDURE — 80053 COMPREHEN METABOLIC PANEL: CPT

## 2025-03-19 PROCEDURE — 36415 COLL VENOUS BLD VENIPUNCTURE: CPT

## 2025-04-01 ENCOUNTER — OFFICE VISIT (OUTPATIENT)
Dept: CARDIOLOGY | Facility: MEDICAL CENTER | Age: 80
End: 2025-04-01
Payer: MEDICARE

## 2025-04-01 VITALS
OXYGEN SATURATION: 95 % | DIASTOLIC BLOOD PRESSURE: 58 MMHG | WEIGHT: 200 LBS | HEART RATE: 83 BPM | SYSTOLIC BLOOD PRESSURE: 100 MMHG | HEIGHT: 69 IN | BODY MASS INDEX: 29.62 KG/M2 | RESPIRATION RATE: 16 BRPM

## 2025-04-01 DIAGNOSIS — E78.5 DYSLIPIDEMIA: ICD-10-CM

## 2025-04-01 DIAGNOSIS — I48.92 ATRIAL FLUTTER, UNSPECIFIED TYPE (HCC): ICD-10-CM

## 2025-04-01 DIAGNOSIS — J63.4 SUPERFICIAL SIDEROSIS PRESENT ON MAGNETIC RESONANCE IMAGING (HCC): ICD-10-CM

## 2025-04-01 DIAGNOSIS — I48.0 PAROXYSMAL ATRIAL FIBRILLATION (HCC): ICD-10-CM

## 2025-04-01 DIAGNOSIS — I34.0 NONRHEUMATIC MITRAL VALVE REGURGITATION: ICD-10-CM

## 2025-04-01 PROBLEM — I49.1 PAC (PREMATURE ATRIAL CONTRACTION): Status: RESOLVED | Noted: 2023-03-03 | Resolved: 2025-04-01

## 2025-04-01 PROBLEM — Z79.01 ANTICOAGULATED: Status: RESOLVED | Noted: 2022-02-15 | Resolved: 2025-04-01

## 2025-04-01 PROCEDURE — 3078F DIAST BP <80 MM HG: CPT

## 2025-04-01 PROCEDURE — 99214 OFFICE O/P EST MOD 30 MIN: CPT

## 2025-04-01 PROCEDURE — 3074F SYST BP LT 130 MM HG: CPT

## 2025-04-01 PROCEDURE — 99212 OFFICE O/P EST SF 10 MIN: CPT

## 2025-04-01 ASSESSMENT — ENCOUNTER SYMPTOMS
EYES NEGATIVE: 1
DEPRESSION: 0
ORTHOPNEA: 0
MUSCULOSKELETAL NEGATIVE: 1
GASTROINTESTINAL NEGATIVE: 1
PND: 0
PALPITATIONS: 0
NERVOUS/ANXIOUS: 0
CONSTITUTIONAL NEGATIVE: 1
SHORTNESS OF BREATH: 0
NEUROLOGICAL NEGATIVE: 1

## 2025-04-01 ASSESSMENT — FIBROSIS 4 INDEX: FIB4 SCORE: 1.98

## 2025-04-01 NOTE — PROGRESS NOTES
Chief Complaint   Patient presents with    Follow-Up     Hospital follow up/ Watchman       Subjective     Taye Roca is a 79 y.o. male who presents today for follow-up of his Watchman device.  He has a history of atrial flutter and fibrillation status post cardioversion, spontaneous intraparenchymal intracranial hemorrhage, superficial siderosis, vertigo, hyperlipidemia, CLL    Due to his risk of recurrent bleeding Eliquis discontinued and Watchman device was placed on 1/16/2025.  He underwent SAMARA on 3/3/2025 which showed stable device, no leak or thrombus    4/1/2025 He presents today with his wife Cammie.  He has been doing well since his Watchman device.  No acute cardiovascular symptoms.     Past Medical History:   Diagnosis Date    Atrial fibrillation (HCC) 2024    Atrial flutter (HCC)     BPH (benign prostatic hypertrophy)     CATARACT 2006, 2007    both done, one at a time    Hemorrhagic disorder (HCC) 10/2024    brain bleed    High cholesterol     HLD (hyperlipidemia)     Hypertension     no medication    Thalassemia     Unspecified urinary incontinence     prostate enlargement, per patient     Past Surgical History:   Procedure Laterality Date    RECOVERY  11/23/2015    Procedure: CATH LAB-CARDIOVERSION-KOZTOWSKI-ANESTHESIA-ICD 10: I48.91;  Surgeon: Recoveryonly Surgery;  Location: SURGERY PRE-POST PROC UNIT [facility] [place];  Service:     APPENDECTOMY  2010    INGUINAL HERNIA REPAIR  10/28/2009    Performed by ANGELI CHOI at SURGERY [facility]    RETINAL DETACHMENT REPAIR Right 1998    OTHER  01/01/1982    tonsillectomy    CATARACT EXTRACTION WITH IOL      bilateral 2006.2007     Family History   Problem Relation Age of Onset    Heart Disease Neg Hx     Heart Failure Neg Hx      Social History     Socioeconomic History    Marital status:      Spouse name: Not on file    Number of children: Not on file    Years of education: Not on file    Highest education level: Not on file   Occupational  History    Not on file   Tobacco Use    Smoking status: Never    Smokeless tobacco: Never   Vaping Use    Vaping status: Never Used   Substance and Sexual Activity    Alcohol use: Not Currently     Comment: rarely    Drug use: No    Sexual activity: Not on file   Other Topics Concern    Not on file   Social History Narrative    Not on file     Social Drivers of Health     Financial Resource Strain: Not on file   Food Insecurity: Not on file   Transportation Needs: Not on file   Physical Activity: Not on file   Stress: Not on file   Social Connections: Feeling Socially Integrated (6/20/2024)    OASIS : Social Isolation     Frequency of experiencing loneliness or isolation: Never   Intimate Partner Violence: Not on file   Housing Stability: Not on file     No Known Allergies  Outpatient Encounter Medications as of 4/1/2025   Medication Sig Dispense Refill    aspirin 81 MG EC tablet Take 1 Tablet by mouth every day. 90 Tablet 0    amiodarone (CORDARONE) 200 MG Tab TAKE 1 TABLET BY MOUTH EVERY  Tablet 1    therapeutic multivitamin-minerals (THERAGRAN-M) Tab Take 1 Tablet by mouth every day.      Cyanocobalamin (B-12) 1000 MCG Tab Take  by mouth every day.      Ascorbic Acid (VITAMIN C) 1000 MG Tab Take  by mouth every day.      Vitamin D, Cholecalciferol, (CHOLECALCIFEROL) 25 MCG (1000 UT) Tab Take 1,000 Units by mouth every day.      escitalopram (LEXAPRO) 10 MG Tab Take 10 mg by mouth every day. Indications: Generalized Anxiety Disorder      Multiple Vitamins-Minerals (OCUVITE PO) Take 1 Capsule by mouth every day. Indications: Nutritional supplement      terazosin (HYTRIN) 10 MG capsule Take 10 mg by mouth every evening. Indications: Benign Enlargement of Prostate      simvastatin (ZOCOR) 20 MG Tab Take 1 Tab by mouth every evening. 30 Tab 11     No facility-administered encounter medications on file as of 4/1/2025.     Review of Systems   Constitutional: Negative.    HENT: Negative.     Eyes: Negative.   "  Respiratory:  Negative for shortness of breath.    Cardiovascular:  Negative for chest pain, palpitations, orthopnea, leg swelling and PND.   Gastrointestinal: Negative.    Genitourinary: Negative.    Musculoskeletal: Negative.    Skin: Negative.    Neurological: Negative.    Endo/Heme/Allergies: Negative.    Psychiatric/Behavioral:  Negative for depression. The patient is not nervous/anxious.               Objective     /58 (BP Location: Left arm, Patient Position: Sitting, BP Cuff Size: Adult)   Pulse 83   Resp 16   Ht 1.753 m (5' 9\")   Wt 90.7 kg (200 lb)   SpO2 95%   BMI 29.53 kg/m²     Physical Exam  Constitutional:       Appearance: Normal appearance.   HENT:      Head: Normocephalic.   Neck:      Vascular: No JVD.   Cardiovascular:      Rate and Rhythm: Normal rate and regular rhythm.      Pulses: Normal pulses.      Heart sounds: Normal heart sounds. No murmur heard.     No friction rub.   Pulmonary:      Effort: Pulmonary effort is normal.      Breath sounds: Normal breath sounds.   Abdominal:      Palpations: Abdomen is soft.   Musculoskeletal:         General: Normal range of motion.      Right lower leg: No edema.      Left lower leg: No edema.   Skin:     General: Skin is warm and dry.   Neurological:      General: No focal deficit present.      Mental Status: He is alert and oriented to person, place, and time.   Psychiatric:         Mood and Affect: Mood normal.         Behavior: Behavior normal.            Lab Results   Component Value Date/Time    WBC 10.8 01/16/2025 06:15 AM    RBC 5.39 01/16/2025 06:15 AM    HEMOGLOBIN 11.1 (L) 01/16/2025 06:15 AM    HEMATOCRIT 34.1 (L) 01/16/2025 06:15 AM    MCV 63.3 (L) 01/16/2025 06:15 AM    MCH 20.6 (L) 01/16/2025 06:15 AM    MCHC 32.6 01/16/2025 06:15 AM    MPV 10.1 01/16/2025 06:15 AM    NEUTSPOLYS 41.80 (L) 01/07/2025 10:32 AM    LYMPHOCYTES 53.30 (H) 01/07/2025 10:32 AM    MONOCYTES 3.30 01/07/2025 10:32 AM    EOSINOPHILS 0.80 01/07/2025 " 10:32 AM    BASOPHILS 0.80 01/07/2025 10:32 AM    HYPOCHROMIA 1+ 01/13/2022 08:06 AM    ANISOCYTOSIS 1+ 12/20/2024 09:40 AM        Lab Results   Component Value Date/Time    CHOLSTRLTOT 125 12/20/2024 09:40 AM    LDL 72 12/20/2024 09:40 AM    HDL 32 (A) 12/20/2024 09:40 AM    TRIGLYCERIDE 103 12/20/2024 09:40 AM       Lab Results   Component Value Date/Time    SODIUM 139 03/19/2025 09:38 AM    POTASSIUM 4.3 03/19/2025 09:38 AM    CHLORIDE 108 03/19/2025 09:38 AM    CO2 19 (L) 03/19/2025 09:38 AM    GLUCOSE 100 (H) 03/19/2025 09:38 AM    BUN 35 (H) 03/19/2025 09:38 AM    CREATININE 1.67 (H) 03/19/2025 09:38 AM     Lab Results   Component Value Date/Time    ALKPHOSPHAT 35 03/19/2025 09:38 AM    ASTSGOT 22 03/19/2025 09:38 AM    ALTSGPT 25 03/19/2025 09:38 AM    TBILIRUBIN 0.7 03/19/2025 09:38 AM      SAMARA 3/3/2025  CONCLUSIONS  Normal left ventricular function.  Mild to moderate mitral regurgitation.  Watchman device is well seated in left atrial appendage, no nick-device   leak    Assessment & Plan     1. Atrial flutter, unspecified type (HCC)        2. Paroxysmal atrial fibrillation (HCC)        3. Dyslipidemia        4. Nonrheumatic mitral valve regurgitation            Medical Decision Making: Today's Assessment/Status/Plan:          Atrial fibrillation/flutter  S/P Watchman Implantation  -placed on 1/17  -Addendum: Patient has approximately 1 more week left of aspirin, confirmed with Dr. Yoder that he is okay to discontinue aspirin after that  -antibiotic prophylaxis for 6 months  -SAMARA showed stable device, no leak, no thrombus  -Continue amiodarone    Mild to moderate mitral regurgitation  -check echo in 2 years  -Discussed with the patient along with warning signs of severe MR including but not limited to chest pain, dizziness, dyspnea, presyncope or syncopal events, excessive fatigue and unable to do activities with the same energy level.    Hyperlipidemia  -Most recent LDL 72  -Continue  simvastatin    Follow-up with Dr. Eid as scheduled in July    This note was dictated using Dragon speech recognition software.

## 2025-04-08 ENCOUNTER — HOSPITAL ENCOUNTER (OUTPATIENT)
Dept: RADIOLOGY | Facility: MEDICAL CENTER | Age: 80
End: 2025-04-08
Attending: PSYCHIATRY & NEUROLOGY
Payer: MEDICARE

## 2025-04-08 DIAGNOSIS — J63.4 SUPERFICIAL SIDEROSIS PRESENT ON MAGNETIC RESONANCE IMAGING (HCC): ICD-10-CM

## 2025-04-08 DIAGNOSIS — Z86.79 HISTORY OF CEREBRAL ATHEROSCLEROSIS: ICD-10-CM

## 2025-04-08 PROCEDURE — 70551 MRI BRAIN STEM W/O DYE: CPT

## 2025-04-09 ENCOUNTER — APPOINTMENT (OUTPATIENT)
Dept: BEHAVIORAL HEALTH | Facility: CLINIC | Age: 80
End: 2025-04-09
Payer: MEDICARE

## 2025-04-09 DIAGNOSIS — F03.90 MAJOR NEUROCOGNITIVE DISORDER (HCC): Primary | ICD-10-CM

## 2025-04-09 NOTE — PROGRESS NOTES
"NEUROPSYCHOLOGICAL EVALUATION  ** CONFIDENTIAL **     Name: Hardy Suarez \"Taye\" Abelino Education:  11   : 1945 Occupation:  Retired   JOEL: 2025 Handedness:  right   CECI: 2025  MRN: [id number]   Referral: Dr. Leary          Identifying Information and Reason for Referral  Hardy Suarez \"Taye\" Abelino is an 80YO right-handed man with a history of cognitive and motor changes who was referred for a neuropsychological evaluation by his neurologist in order to better appreciate his cognitive status.     Background Information  The following information was obtained from a clinical interview with the patient, his wife, and his son, and review of select medical records.       Presenting Concerns: Over the course of the last approximately year the patient reportedly has experienced an onset of motor difficulties.  The family described that he experiences freezing of gait.  His wife noted that this is more pronounced when he is going through doorways.   He was previously using a cane, but now uses a walker to aid with ambulation.  Gait is also reported to be shuffling and posture was stooped prior to using a walker.   He has experienced several falls one of which resulted this past January in a hospitalization.  The patient also experiences right hand tremor.  His legs also reportedly tremor at night.  The patient denied any changes in his sense of smell or constipation.  He did note that he becomes dizzy upon rising from a seated position.    The patient has also experienced an onset of cognitive symptoms.  The family described that his short-term memory has declined and he tends to repeat himself.  Additional cognitive symptoms include word finding difficulties, inattention, and slowed speed of processing.  The family feels that these cognitive symptoms have become worse over time.  Of note, the patient indicated that he does not notice a change in his cognitive status                       Current Functioning: The " "patient reported that approximately 2 to 3 years ago he experienced an episode of becoming confused as to where he was at a stoplight.  After this incident, he decided to discontinue driving.  His wife took over managing their finances in 2018, but this did not occur because of a change in his ability.  The patient requires assistance with managing his medications.  The patient reportedly requires some assistance with aspects of self-care.  His wife indicated that she assists with showering.  The patient had previously in enjoyed golfing, but is no longer able to do so.  At present, he primarily spends his time at home watching television.     Emotional Functioning: The patient reported that his mood is \"fine\".  However it is apparent that he has experienced a decline in his interest in participating in activities.  He denied suicidal ideation, plan, or intent.  Sleep is reportedly \"okay\".  He sleeps from approximately 9:30 PM until 8:30 AM.  He does wake up at night.  The patient denied any REM sleep behaviors.  Appetite is stable.  He reportedly naps daily approximately 1 to 1.5 hours.     Medical History:  has a past medical history of Atrial fibrillation (HCC) (2024), Atrial flutter (HCC), BPH (benign prostatic hypertrophy), CATARACT (2006, 2007), Hemorrhagic disorder (HCC) (10/2024), High cholesterol, HLD (hyperlipidemia), Hypertension, Thalassemia, and Unspecified urinary incontinence.     Medical history is also remarkable for concussion sustained at age 14 playing sports.  Of note, the patient indicated that he has also hit his head as a result of falls in the more recent past.     Problem List:   Patient Active Problem List   Diagnosis    Atrial flutter (HCC)    BPH (benign prostatic hypertrophy)    Secondary hypercoagulable state (HCC)    Dyslipidemia    PVCs (premature ventricular contractions)    Orthostatic lightheadedness    BPPV (benign paroxysmal positional vertigo), bilateral    Thalassemia minor    " "Leukocytosis    Advance care planning    Chronic lymphocytic leukemia (HCC)    History of cerebral atherosclerosis    Superficial siderosis present on magnetic resonance imaging (HCC)    H/O spontaneous intraparenchymal intracranial hemorrhage    Paroxysmal atrial fibrillation (HCC)    Moderate dementia without behavioral disturbance, psychotic disturbance, mood disturbance, or anxiety (HCC)    Nonrheumatic mitral valve regurgitation      Psychiatric History: None     Substance Use: None     Medications at the Time of the Evaluation (per records): has a current medication list which includes the following prescription(s): aspirin, amiodarone, therapeutic multivitamin-minerals, b-12, vitamin c, vitamin d (cholecalciferol), escitalopram, multiple vitamins-minerals, terazosin, and simvastatin.     Family Medical/Psychiatric History: Cardiac illness in his mother, thalassemia and multiple family members, and dementia in his grandfather     Psychosocial History: The patient completed 11 years of education without a history of difficulties with learning.  The patient served in the [employer], but did not engage in active combat.  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  The patient is retired.  The patient is remarried and has 3 children.     Brief Record Review:   MRI of Brain on 4/8/2025  \"4/8/2025 10:02 AM    FINDINGS:    Age-related volume loss noted with prominent sulci, cisterns and ventricles. Ventricular dilatation is proportionate to the degree of cerebral volume loss. Diffusion imaging demonstrates small area of increased signal in the inferior right frontal lobe,  nonspecific. Nonspecific T2 hyperintensities are noted in the periventricular and deep white matter, most likely related to chronic microvascular ischemia.  Intracranial flow voids are normal.  Hemosiderin staining noted in the left frontal lobe, " "unchanged from prior study surrounding encephalomalacia is also noted, stable. Superficial siderosis involving the bilateral frontal regions, left parietal region again noted, unchanged from the  previous exam. Remote lacunar infarct in the right corona radiata are noted, stable.  Bone marrow signal in the calvarium is within normal limits.  Included portions of the paranasal sinuses are within normal limits.  Included portions of the mastoid air cells are within normal limits.  Included portions of the orbits are within normal limits    Impression:      No acute process.    Age-related volume loss and chronic microvascular ischemic changes.    Superficial siderosis involving the bilateral frontal and left parietal region.    Stable appearance of left frontal encephalomalacia with hemosiderin deposition related to previous hemorrhage.\"    Dr. Leary evaluated the patient on 2/13/2025.  Per his note \"This gentleman suffers from dementia, moderate in severity, the deficits showing interference with normal function.  The issue is that there are several possible contributing factors.  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  He may also be having seizures occur, he has risk given the superficial siderosis on imaging, and even though routine EEG study was unremarkable, more prolonged study probably would be more sensitive.     The other possibility is that a cortical process such as frontotemporal disease or Alzheimer's disease could be contributing.\"     Behavioral Observations/Neurobehavioral Examination  Informed consent procedures were reviewed with the patient, and written consent was obtained.  The patient arrived to his appointment on time and accompanied by his son and wife.  He used a walker to aid with ambulation.  Right " "hand upper extremity rest tremor was observed.  He was alert and attention.  He was disoriented to temporal information as well as current location.  He stated it was \"Thursday, March 4, 1011\".  He could not state the city or current president.  He was able to correctly state his age.  Spontaneous speech was of low volume and notable for word finding difficulties.  Comprehension was grossly intact, but he did struggle with some task instructions.  Test scores are considered a valid representation of the patient's current neuropsychological functioning.        Tests Administered  Note, these tests were administered by a psychometrist.    [place] Naming Test-Second Edition (BNT-2)  Repeatable Battery for the Assessment of Neuropsychological Status (RBANS)  Test of Premorbid Functioning (TOPF)  Trail Making Test  Verbal Fluency  Wechsler Adult Intelligence Test-Fourth Edition (WAIS-4)-selected subtests    Test Results  Please see the attached data table for a summary of all test scores. To be scanned in the media section of the EMR.  The values and ranges listed in the data table were derived from comparison of patient scores to available normative data sets. Multiple normative sets were used to calculate these values and ranges, and these data sets differ in the degree to which they control for demographic factors that have been shown to impact cognitive test performance (e.g., age, education, gender, race). For this reason, interpretation of these values and ranges is contingent upon knowledge of the psychometric properties of each test and the characteristics of the normative sets that were used to obtain the derived comparison. Please refer to the Summary and Impressions section of this report for an informed integration and interpretation of the data.     Results and Impressions  Based upon word reading and demographic information, the patient was estimated to be of likely average premorbid intellectual abilities.  " In the context of intact attention/working memory, the patient demonstrated rather global cognitive impairment.  Compared to same aged peers, performances were impaired on measures of: speed of processing, expressive language, learning and memory (with some evidence of rapid forgetting), visual spatial skills, and executive functioning.      In sum, the present profile is abnormal and consistent with rather global neurocognitive impairment.  Test results along with reported declines in ability to complete activities of daily living is consistent with the diagnosis of Major Neurocognitive Disorder.  Given the rather diffuse nature of his deficits, it is difficult to discern a pattern that suggests etiology.  However, he does show signs of both cortical and vascular cognitive impairment.        Based upon the present evaluation the following recommendations are offered:    The patient was offered the opportunity to discuss his test results with me. I will send the results via [employer].  The family indicated if they have questions they will call me.  They also plan to discuss these results with their neurologist.  If not already addressed/considered, functional neuroimaging may aid with differential diagnosis (e.g., Esau scan).    It is recommended that he receive moderate oversight of his completion of daily activities by his family or other caregiver to ensure his safety.  Due to his cognitive difficulties, it is recommended that the patient continue to not drive.  The patient may benefit from use of compensatory strategies, such as a datebook,  notebook, or smartphone as a centralized location to record and reference important information such as appointments, contacts, medication instructions, alarms, and reminders. Ideally, this would be small enough so that it could be carried with him at all times.    The patient is encouraged to engage in the following lifestyle/healthy behaviors that have been associated with  healthy aging and brain health:  He is recommended to keep physically, mentally, and socially active.  Physical activity, especially 150+ minutes of exercise/week has recently been associated with maintaining good brain health with age. As such, if medically cleared, adhering to a routine of daily light/low impact exercise may be beneficial for him.  Research has demonstrated that adherence to a Mediterranean style diet (e.g., the MIND diet) may have cognitive benefit.   Tight control of any cerebrovascular risk factors will be important to limit any vascular related cognitive change.  Regular social engagement is recommended, as it can benefit cognitive and psychological health.  A follow-up neuropsychological evaluation is recommended in 12 months.        Thank you for allowing me to participate in Hardy Roca's care.  If you have any questions about the information contained in this report, please do not hesitate to contact me through the Neuropsychology Service at [phone].     Heena Stein, Ph.D, ABPP  Board Certified in Clinical Neuropsychology  Professor of Psychiatry  Licensed Psychologist [place] #[id number]     Neuropsychological Services Provided Code Total Hours Date/Time   Clinical Interview/Neurobehavioral Status Exam          Hour 1 47965 1 4/9 1-151    Professional Neuropsychological Testing Evaluation Services         Hour 1 60648 1 4/11 4-438; 4/15 219-229; 4/16 844-930       Each Additional Hour  14378 1     Neuropsychological Test Administration and Scoring by Technician         First 30-minutes 12485 1 4/9 151-342; 4/11 8-9        Additional 30-minutes 45131  5

## 2025-04-24 ENCOUNTER — HOSPITAL ENCOUNTER (OUTPATIENT)
Facility: MEDICAL CENTER | Age: 80
End: 2025-04-24
Attending: PSYCHIATRY & NEUROLOGY
Payer: MEDICARE

## 2025-04-24 DIAGNOSIS — C91.10 CHRONIC LYMPHOCYTIC LEUKEMIA (HCC): ICD-10-CM

## 2025-04-24 LAB
BASOPHILS # BLD AUTO: 0.9 % (ref 0–1.8)
BASOPHILS # BLD: 0.08 K/UL (ref 0–0.12)
EOSINOPHIL # BLD AUTO: 0.08 K/UL (ref 0–0.51)
EOSINOPHIL NFR BLD: 0.8 % (ref 0–6.9)
ERYTHROCYTE [DISTWIDTH] IN BLOOD BY AUTOMATED COUNT: 35.2 FL (ref 35.9–50)
HCT VFR BLD AUTO: 36.1 % (ref 42–52)
HGB BLD-MCNC: 11.1 G/DL (ref 14–18)
LYMPHOCYTES # BLD AUTO: 4.62 K/UL (ref 1–4.8)
LYMPHOCYTES NFR BLD: 49.1 % (ref 22–41)
MANUAL DIFF BLD: NORMAL
MCH RBC QN AUTO: 20.1 PG (ref 27–33)
MCHC RBC AUTO-ENTMCNC: 30.7 G/DL (ref 32.3–36.5)
MCV RBC AUTO: 65.3 FL (ref 81.4–97.8)
MICROCYTES BLD QL SMEAR: ABNORMAL
MONOCYTES # BLD AUTO: 0.49 K/UL (ref 0–0.85)
MONOCYTES NFR BLD AUTO: 5.2 % (ref 0–13.4)
NEUTROPHILS # BLD AUTO: 4.14 K/UL (ref 1.82–7.42)
NEUTROPHILS NFR BLD: 44 % (ref 44–72)
NRBC # BLD AUTO: 0 K/UL
NRBC BLD-RTO: 0 /100 WBC (ref 0–0.2)
OVALOCYTES BLD QL SMEAR: NORMAL
PLATELET # BLD AUTO: 167 K/UL (ref 164–446)
PLATELET BLD QL SMEAR: NORMAL
PMV BLD AUTO: 11.1 FL (ref 9–12.9)
POIKILOCYTOSIS BLD QL SMEAR: NORMAL
POLYCHROMASIA BLD QL SMEAR: NORMAL
RBC # BLD AUTO: 5.53 M/UL (ref 4.7–6.1)
RBC BLD AUTO: PRESENT
SCHISTOCYTES BLD QL SMEAR: NORMAL
SMUDGE CELLS BLD QL SMEAR: NORMAL
VARIANT LYMPHS BLD QL SMEAR: NORMAL
WBC # BLD AUTO: 9.4 K/UL (ref 4.8–10.8)

## 2025-04-24 PROCEDURE — 36415 COLL VENOUS BLD VENIPUNCTURE: CPT

## 2025-04-24 PROCEDURE — 85007 BL SMEAR W/DIFF WBC COUNT: CPT

## 2025-04-24 PROCEDURE — 85027 COMPLETE CBC AUTOMATED: CPT

## 2025-05-05 ENCOUNTER — HOSPITAL ENCOUNTER (OUTPATIENT)
Dept: HEMATOLOGY ONCOLOGY | Facility: MEDICAL CENTER | Age: 80
End: 2025-05-05
Attending: STUDENT IN AN ORGANIZED HEALTH CARE EDUCATION/TRAINING PROGRAM
Payer: MEDICARE

## 2025-05-05 VITALS
DIASTOLIC BLOOD PRESSURE: 56 MMHG | OXYGEN SATURATION: 95 % | BODY MASS INDEX: 29.18 KG/M2 | WEIGHT: 197 LBS | HEART RATE: 90 BPM | SYSTOLIC BLOOD PRESSURE: 102 MMHG | HEIGHT: 69 IN | TEMPERATURE: 98.3 F

## 2025-05-05 DIAGNOSIS — C91.10 CHRONIC LYMPHOCYTIC LEUKEMIA (HCC): ICD-10-CM

## 2025-05-05 PROCEDURE — 99212 OFFICE O/P EST SF 10 MIN: CPT | Performed by: STUDENT IN AN ORGANIZED HEALTH CARE EDUCATION/TRAINING PROGRAM

## 2025-05-05 PROCEDURE — 99213 OFFICE O/P EST LOW 20 MIN: CPT | Performed by: STUDENT IN AN ORGANIZED HEALTH CARE EDUCATION/TRAINING PROGRAM

## 2025-05-05 ASSESSMENT — ENCOUNTER SYMPTOMS
SORE THROAT: 0
DIAPHORESIS: 0
COUGH: 0
CONSTIPATION: 0
DIARRHEA: 0
TINGLING: 0
BRUISES/BLEEDS EASILY: 0
MYALGIAS: 0
NAUSEA: 0
INSOMNIA: 0
FEVER: 0
ORTHOPNEA: 0
NERVOUS/ANXIOUS: 0
WEIGHT LOSS: 0
BLURRED VISION: 0
WHEEZING: 0
BACK PAIN: 0
WEAKNESS: 0
HEARTBURN: 0
SINUS PAIN: 0
DOUBLE VISION: 0
ABDOMINAL PAIN: 0
BLOOD IN STOOL: 0
PALPITATIONS: 0
VOMITING: 0
DIZZINESS: 0
SHORTNESS OF BREATH: 0
HEADACHES: 0
SPUTUM PRODUCTION: 0
CHILLS: 0

## 2025-05-05 ASSESSMENT — PAIN SCALES - GENERAL: PAINLEVEL_OUTOF10: NO PAIN

## 2025-05-05 ASSESSMENT — FIBROSIS 4 INDEX: FIB4 SCORE: 2.11

## 2025-05-05 NOTE — ADDENDUM NOTE
Encounter addended by: Clem Werner, Med Ass't on: 5/5/2025 12:05 PM   Actions taken: Charge Capture section accepted

## 2025-05-05 NOTE — PROGRESS NOTES
Follow Up Note:  Hematology/Oncology      Primary Care:  Leticia Wilkerson M.D.    Diagnosis: CLL    Chief Complaint: On-treatment visit    Current Treatment: Surveillance    Prior Treatment: NA    Oncology History of Presenting Illness:  Hardy Roca is a 78 y.o.  man who presents to the clinic for evaluation for systemic therapy for a diagnosis of CLL. He was noted to have lab abnormalities when he went to the hospital for symptoms of vertigo, with a lymphocytosis, and subsequent work up with a flow cytometry revealed CLL. He was subsequently referred for evaluation. Of note, he also has alpha thalassemia trait.     Treatment History: NA    Interval History:  Patient is here for follow up visit. He feels well and has no complaints at this time. He had a Watchman placed in January and is doing well.     Allergies as of 05/05/2025    (No Known Allergies)         Current Outpatient Medications:     aspirin 81 MG EC tablet, Take 1 Tablet by mouth every day., Disp: 90 Tablet, Rfl: 0    amiodarone (CORDARONE) 200 MG Tab, TAKE 1 TABLET BY MOUTH EVERY DAY, Disp: 100 Tablet, Rfl: 1    therapeutic multivitamin-minerals (THERAGRAN-M) Tab, Take 1 Tablet by mouth every day., Disp: , Rfl:     Cyanocobalamin (B-12) 1000 MCG Tab, Take  by mouth every day., Disp: , Rfl:     Ascorbic Acid (VITAMIN C) 1000 MG Tab, Take  by mouth every day., Disp: , Rfl:     Vitamin D, Cholecalciferol, (CHOLECALCIFEROL) 25 MCG (1000 UT) Tab, Take 1,000 Units by mouth every day., Disp: , Rfl:     escitalopram (LEXAPRO) 10 MG Tab, Take 10 mg by mouth every day. Indications: Generalized Anxiety Disorder, Disp: , Rfl:     Multiple Vitamins-Minerals (OCUVITE PO), Take 1 Capsule by mouth every day. Indications: Nutritional supplement, Disp: , Rfl:     terazosin (HYTRIN) 10 MG capsule, Take 10 mg by mouth every evening. Indications: Benign Enlargement of Prostate, Disp: , Rfl:     simvastatin (ZOCOR) 20 MG Tab, Take 1 Tab by mouth every evening.,  "Disp: 30 Tab, Rfl: 11      Review of Systems:  Review of Systems   Constitutional:  Negative for chills, diaphoresis, fever, malaise/fatigue and weight loss.   HENT:  Negative for hearing loss, nosebleeds, sinus pain and sore throat.    Eyes:  Negative for blurred vision and double vision.   Respiratory:  Negative for cough, sputum production, shortness of breath and wheezing.    Cardiovascular:  Negative for chest pain, palpitations, orthopnea and leg swelling.   Gastrointestinal:  Negative for abdominal pain, blood in stool, constipation, diarrhea, heartburn, melena, nausea and vomiting.   Genitourinary:  Negative for dysuria, frequency, hematuria and urgency.   Musculoskeletal:  Negative for back pain, joint pain and myalgias.   Skin:  Negative for rash.   Neurological:  Negative for dizziness, tingling, weakness and headaches.   Endo/Heme/Allergies:  Does not bruise/bleed easily.   Psychiatric/Behavioral:  The patient is not nervous/anxious and does not have insomnia.          Physical Exam:  Vitals:    05/05/25 1100   Height: 1.753 m (5' 9.02\")       DESC; KARNOFSKY SCALE WITH ECOG EQUIVALENT: 100, Fully active, able to carry on all pre-disease performed without restriction (ECOG equivalent 0)    DISTRESS LEVEL: no apparent distress    Physical Exam  Vitals and nursing note reviewed.   Constitutional:       General: He is awake. He is not in acute distress.     Appearance: Normal appearance. He is normal weight. He is not ill-appearing, toxic-appearing or diaphoretic.   HENT:      Head: Normocephalic and atraumatic.      Nose: Nose normal. No congestion.      Mouth/Throat:      Pharynx: Oropharynx is clear. No oropharyngeal exudate or posterior oropharyngeal erythema.   Eyes:      General: No scleral icterus.     Extraocular Movements: Extraocular movements intact.      Conjunctiva/sclera: Conjunctivae normal.      Pupils: Pupils are equal, round, and reactive to light.   Cardiovascular:      Rate and Rhythm: " Normal rate and regular rhythm.      Pulses: Normal pulses.      Heart sounds: Normal heart sounds. No murmur heard.     No friction rub. No gallop.   Pulmonary:      Effort: Pulmonary effort is normal.      Breath sounds: Normal breath sounds. No decreased air movement. No wheezing, rhonchi or rales.   Abdominal:      General: Bowel sounds are normal. There is no distension.      Tenderness: There is no abdominal tenderness.   Musculoskeletal:         General: No deformity. Normal range of motion.      Cervical back: Normal range of motion and neck supple. No tenderness.      Right lower leg: No edema.      Left lower leg: No edema.   Lymphadenopathy:      Cervical: No cervical adenopathy.      Upper Body:      Right upper body: No axillary adenopathy.      Left upper body: No axillary adenopathy.      Lower Body: No right inguinal adenopathy. No left inguinal adenopathy.   Skin:     General: Skin is warm and dry.      Coloration: Skin is not jaundiced.      Findings: No erythema or rash.   Neurological:      General: No focal deficit present.      Mental Status: He is alert and oriented to person, place, and time.      Sensory: Sensation is intact.      Motor: Motor function is intact. No weakness.      Gait: Gait is intact.   Psychiatric:         Attention and Perception: Attention normal.         Mood and Affect: Mood normal.         Behavior: Behavior normal. Behavior is cooperative.         Thought Content: Thought content normal.         Judgment: Judgment normal.           Labs:  No visits with results within 7 Day(s) from this visit.   Latest known visit with results is:   Hospital Outpatient Visit on 04/24/2025   Component Date Value Ref Range Status    WBC 04/24/2025 9.4  4.8 - 10.8 K/uL Final    RBC 04/24/2025 5.53  4.70 - 6.10 M/uL Final    Hemoglobin 04/24/2025 11.1 (L)  14.0 - 18.0 g/dL Final    Hematocrit 04/24/2025 36.1 (L)  42.0 - 52.0 % Final    MCV 04/24/2025 65.3 (L)  81.4 - 97.8 fL Final    MCH  04/24/2025 20.1 (L)  27.0 - 33.0 pg Final    MCHC 04/24/2025 30.7 (L)  32.3 - 36.5 g/dL Final    RDW 04/24/2025 35.2 (L)  35.9 - 50.0 fL Final    Platelet Count 04/24/2025 167  164 - 446 K/uL Final    MPV 04/24/2025 11.1  9.0 - 12.9 fL Final    Neutrophils-Polys 04/24/2025 44.00  44.00 - 72.00 % Final    Lymphocytes 04/24/2025 49.10 (H)  22.00 - 41.00 % Final    Monocytes 04/24/2025 5.20  0.00 - 13.40 % Final    Eosinophils 04/24/2025 0.80  0.00 - 6.90 % Final    Basophils 04/24/2025 0.90  0.00 - 1.80 % Final    Nucleated RBC 04/24/2025 0.00  0.00 - 0.20 /100 WBC Final    Neutrophils (Absolute) 04/24/2025 4.14  1.82 - 7.42 K/uL Final    Includes immature neutrophils, if present.    Lymphs (Absolute) 04/24/2025 4.62  1.00 - 4.80 K/uL Final    Monos (Absolute) 04/24/2025 0.49  0.00 - 0.85 K/uL Final    Eos (Absolute) 04/24/2025 0.08  0.00 - 0.51 K/uL Final    Baso (Absolute) 04/24/2025 0.08  0.00 - 0.12 K/uL Final    NRBC (Absolute) 04/24/2025 0.00  K/uL Final    Microcytosis 04/24/2025 3+ (A)   Final    Manual Diff Status 04/24/2025 PERFORMED   Final    Plt Estimation 04/24/2025 Normal   Final    RBC Morphology 04/24/2025 Present   Final    Polychromia 04/24/2025 1+   Final    Poikilocytosis 04/24/2025 1+   Final    Ovalocytes 04/24/2025 1+   Final    Schistocytes 04/24/2025 1+   Final    Smudge Cells 04/24/2025 Few   Final    Reactive Lymphocytes 04/24/2025 Few   Final       Imaging:     All listed images below have been independently reviewed by me. I agree with the findings as summarized below:    MR-MRA HEAD-W/O    Result Date: 10/11/2024  10/10/2024 4:35 PM HISTORY/REASON FOR EXAM:  Superficial siderosis r/o AVM, aneurysm. TECHNIQUE/EXAM DESCRIPTION: MRA of the head ([place]) without contrast. The study was performed on a Rolly 1.5 Maricarmen MRI scanner. MRA of the [place] was performed with 3D time-of-flight technique with axial acquisition. Additional MRA of the internal carotid and  vertebrobasilar arteries at the level of the skull base and craniocervical junction was also performed with 3D time-of-flight technique with axial acquisition. Images are reviewed at the PACS workstation as axial source images as well as maximum intensity ray projection (MIP) multiplanar 3D reconstructions. COMPARISON:  CT angiogram of the head 4/16/2024. Brain MRI 4/17/2024 FINDINGS: The distal vertebral arteries show normal caliber and flow signal intensity. The vertebrobasilar confluence is intact. The basilar artery is patent. The posterior cerebral arteries are unremarkable. No aneurysm or occlusive lesion is seen in the posterior circulation. The anterior circulation shows the carotid siphons to be intact. The middle cerebral and anterior cerebral arteries are intact. No aneurysm or intracranial occlusive lesion is evident. There is a subacute hematoma in the left frontal lobe measuring about 3.8 x 2.3 cm which is new from the prior brain MRI. There are confluent and scattered T2 hyperintensities in cerebral white matter again noted system with chronic microvascular ischemic changes.     MRA of the [place] within normal limits with no evidence of aneurysm or cerebrovascular occlusive disease. There is a new subacute hematoma in the anterior left frontal lobe, new from brain MRI 4/17/2024 however subacute in chronicity. Initial attempt to reach ordering provider at the time of study was unsuccessful. Patient was instructed that they may go to the emergency room for further evaluation.      Pathology:  +13q deletion    Assessment & Plan:  1. Chronic lymphocytic leukemia (HCC)          This is a now 79 year old  man with Carrillo stage 0 CLL, who presents for evaluation.      Current Diagnosis and Staging: CLL, Carrillo stage 0, 13q deletion; alpha thalassemia trait.    Update: Patient remains Carrillo stage 0 favorable risk disease. Continue with surveillance.      Treatment Plan: Surveillance     Treatment  Citation: NCCN     Plan of Care:     Primary Therapy: NA  Supportive Therapy: Medical management per primary  Toxicity: NA  Labs: CBC with diff monitoring  Imaging: NA  Treatment Planning: Patient has Carrillo stage 0 CLL so can be monitored without treatment for now.   Consultations: BRIONNA  Code Status: Full  Miscellaneous: BRIONNA  Return for Follow Up: 6 months    Any questions and concerns raised by the patient were answered to the best of my ability. Thank you for allowing me to participate in the care for this patient. Please feel free to contact me for any questions or concerns.

## 2025-05-30 ENCOUNTER — OFFICE VISIT (OUTPATIENT)
Dept: NEUROLOGY | Facility: MEDICAL CENTER | Age: 80
End: 2025-05-30
Attending: PSYCHIATRY & NEUROLOGY
Payer: MEDICARE

## 2025-05-30 VITALS
HEIGHT: 68 IN | BODY MASS INDEX: 29.87 KG/M2 | DIASTOLIC BLOOD PRESSURE: 56 MMHG | SYSTOLIC BLOOD PRESSURE: 100 MMHG | TEMPERATURE: 98.1 F | HEART RATE: 79 BPM | OXYGEN SATURATION: 94 % | WEIGHT: 197.09 LBS

## 2025-05-30 DIAGNOSIS — F01.B0 MODERATE MIXED CORTICAL AND SUBCORTICAL VASCULAR DEMENTIA WITHOUT BEHAVIORAL DISTURBANCE, PSYCHOTIC DISTURBANCE, MOOD DISTURBANCE, OR ANXIETY (HCC): Primary | ICD-10-CM

## 2025-05-30 PROCEDURE — 99213 OFFICE O/P EST LOW 20 MIN: CPT | Performed by: PSYCHIATRY & NEUROLOGY

## 2025-05-30 ASSESSMENT — PATIENT HEALTH QUESTIONNAIRE - PHQ9
SUM OF ALL RESPONSES TO PHQ QUESTIONS 1-9: 2
5. POOR APPETITE OR OVEREATING: 0 - NOT AT ALL
CLINICAL INTERPRETATION OF PHQ2 SCORE: 1

## 2025-05-30 ASSESSMENT — FIBROSIS 4 INDEX: FIB4 SCORE: 2.11

## 2025-05-30 NOTE — ASSESSMENT & PLAN NOTE
A mixed cortical and subcortical dementing process is at play here, unfortunately there are no real treatments that can be used to slow this process down.  He is already on medications to slow atherosclerotic change, as well as reduce stroke risk, and if CAA is also playing a role, there is little that can be done other than avoiding blood thinning agents, as well as head trauma from falls, etc.    He has no real issues with depression, appetite changes, insomnia, etc.  The apathy he suffers from is part of this condition and is also difficult to treat symptomatically.  I do not think he is overtly depressed or anxious, he has not had problems with hallucinations and agitation.  All of this is a good sign.  Though exercise would be a great idea, the apathy as well as his difficulty operating his walker makes this problematic at best.    Follow-up EEG for 72 hours is still pending, we will contact him with these results.  A single additional follow-up will be scheduled, and afterwards there is no need for additional long-term neurologic follow-up.  Medications can be used symptomatically, but unless there is a sudden change in his condition that would be atypical, we are simply left supporting him as best as possible.  I told his wife that there are supportive services including adult  and respite care that would allow her some time for herself.  She understands that as the condition progresses her need to participate in all of these activities will increase further.    Practically, they have vila of  as well as a living will/advanced directives already.  He is no longer competent.

## 2025-05-30 NOTE — PROGRESS NOTES
Subjective     Taye Roca is a 79 y.o. male who presents with his wife Cammie and son Abdifatah, for follow-up, with a history of dementia, status post repeat MRI of the brain and neuropsychological testing, still pending 72-hour ambulatory EEG.  Issues got from review of records as well as discussion with the patient and his family.  With a history of dementia.    CHRISTOPH Kothari states that he has been doing well.  He denies any major problems with his life that is ongoing.  He seems to recall that he has seen me in the past but has no idea of my name, and when asked, no idea why he is seeing me.    The short-term memory difficulties remain.  He repeats himself.  Communication skills are also lacking, especially word finding as well as active speech production.  Complexity of conversation has been simplified.    He has not been hallucinating.  Mood has been stable.  He sleeps well.  Appetite is stable.  He takes medications when given to him.  He does seem to enjoy himself when he is with family, but the apathy he has been dealing with remains.  He has a walker, simply refuses to use it.  He has not been falling.    He maintains bowel and bladder control, he does dress himself, he requires assistance only with getting in and out of the shower.    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  Neuropsych testing confirmed the presence of both a cortical process as well as subcortical disease likely related to the extensive atherosclerotic changes and hemorrhagic events, a cortical process also could be involved and likely is Alzheimer's disease.  His ambulatory EEG tracing is still pending for early June.    Medical, surgical and family histories are reviewed, there are no new drug allergies.  From my standpoint he is on no medications.  He is  "still on Cordarone, Lexapro, Zocor, Hytrin, vitamin B12, vitamin C, vitamin D with calcium and a multivitamin.    Review of Systems   Unable to perform ROS: Dementia     Objective     /56 (BP Location: Left arm, Patient Position: Sitting, BP Cuff Size: Adult)   Pulse 79   Temp 36.7 °C (98.1 °F) (Oral)   Ht 1.727 m (5' 8\")   Wt 89.4 kg (197 lb 1.5 oz)   SpO2 94%   BMI 29.97 kg/m²      Physical Exam    He appears in no acute distress.  He is quite cooperative.  He is also clean and appropriately dressed.  Vital signs are stable.  There is no malar rash or sialorrhea.  The neck is supple, range of motion is full.  Cardiac evaluation reveals a regular rhythm.     Neurological Exam    He is not oriented, and though he knows he is in a hospital, he does not recall the name nor the city where we are located.  He believes the date is August, 2026.  He names and repeats though his mental processing speed is slowed.  There are no paraphasic errors used.  There is no agnosia or apraxia.  Multistep commands are difficult for him to process.    PERRLA/EOMI, visual fields are full, facial movements are symmetric, there is no bulbar dysfunction.  Shoulder shrug and head rotation are symmetric.    Musculoskeletal exam reveals normal tone without tremor or drift.  Strength is intact.  Reflex exam is deferred.    The apractic quality to his gait is still there.  Station is still increased.  There is no appendicular dystaxia.  Repetitive movements are normal throughout.    Sensory exam is deferred.  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  He is already on medications to slow atherosclerotic change, as well as reduce stroke risk, and if CAA is " also playing a role, there is little that can be done other than avoiding blood thinning agents, as well as head trauma from falls, etc.    He has no real issues with depression, appetite changes, insomnia, etc.  The apathy he suffers from is part of this condition and is also difficult to treat symptomatically.  I do not think he is overtly depressed or anxious, he has not had problems with hallucinations and agitation.  All of this is a good sign.  Though exercise would be a great idea, the apathy as well as his difficulty operating his walker makes this problematic at best.    Follow-up EEG for 72 hours is still pending, we will contact him with these results.  A single additional follow-up will be scheduled, and afterwards there is no need for additional long-term neurologic follow-up.  Medications can be used symptomatically, but unless there is a sudden change in his condition that would be atypical, we are simply left supporting him as best as possible.  I told his wife that there are supportive services including adult  and respite care that would allow her some time for herself.  She understands that as the condition progresses her need to participate in all of these activities will increase further.    Practically, they have vila of  as well as a living will/advanced directives already.  He is no longer competent.    Time: 40 minutes in total spent on patient care including pre-charting, record review, discussion with healthcare staff and documentation.  This includes face-to-face time for exam, review, discussion, as well as counseling and coordinating care.

## 2025-06-09 ENCOUNTER — NON-PROVIDER VISIT (OUTPATIENT)
Dept: NEUROLOGY | Facility: MEDICAL CENTER | Age: 80
End: 2025-06-09
Attending: STUDENT IN AN ORGANIZED HEALTH CARE EDUCATION/TRAINING PROGRAM
Payer: MEDICARE

## 2025-06-09 DIAGNOSIS — F03.B0 MODERATE DEMENTIA WITHOUT BEHAVIORAL DISTURBANCE, PSYCHOTIC DISTURBANCE, MOOD DISTURBANCE, OR ANXIETY, UNSPECIFIED DEMENTIA TYPE (HCC): Primary | ICD-10-CM

## 2025-06-09 PROCEDURE — 95708 EEG WO VID EA 12-26HR UNMNTR: CPT | Performed by: STUDENT IN AN ORGANIZED HEALTH CARE EDUCATION/TRAINING PROGRAM

## 2025-06-09 PROCEDURE — 95719 EEG PHYS/QHP EA INCR W/O VID: CPT | Performed by: STUDENT IN AN ORGANIZED HEALTH CARE EDUCATION/TRAINING PROGRAM

## 2025-06-09 PROCEDURE — 95700 EEG CONT REC W/VID EEG TECH: CPT | Performed by: STUDENT IN AN ORGANIZED HEALTH CARE EDUCATION/TRAINING PROGRAM

## 2025-06-09 NOTE — PROCEDURES
(No note.)   brief awakenings/arousals or abnormal/excessive movements in sleep.  Furthermore, there is very little to absent non-REM slow-wave sleep and little to no REM sleep.     ICTAL AND INTERICTAL FINDINGS:   No focal or generalized epileptiform activity noted.     No regional slowing or persistent focal asymmetries were seen.    No seizures.    ACTIVATION PROCEDURES:   Intermittent Photic stimulation was performed in a stepwise fashion from 1 to 30 Hz and did not elicited any abnormalities on EEG.     EKG: Sampling of the EKG recording showed sinus rhythm    EVENTS:    No clinical events were captured or reported.    INTERPRETATION:  Abnormal ambulatory EEG recording in the awake, drowsy, and sleep state(s):  Mild to moderate background slowing, with frequent diffuse rhythmic background slowing at 3.5 to 4.5 Hz (GRDA), suggestive of diffuse/multifocal cerebral dysfunction of a nonspecific etiology.  Common causes include toxic/metabolic encephalopathy, neurodegenerative disease, diffuse white matter damage which itself can arise from various causes, and/or nick-ictal or postictal phenomenon,  name a few.  Clinical and radiographic correlation recommended  No persistent focal asymmetries seen.  No definitive epileptiform discharges were seen.  No clinical events reported  It is also worth noting that frequent sleep disturbances were observed throughout the night, either in the form of brief awakenings/arousals or abnormal/excessive movements in sleep.  Furthermore, there was very little to absent non-REM slow-wave sleep and little to no REM sleep.  Clinical correlation advised  Compared to the routine EEG done on 11/27/2024, diffuse background slowing is now present            Christofer Molina MD  Department of Neurology at [facility]  General Neurologist and Epileptologist  Director of [facility] Level III Comprehensive Epilepsy Program  Professor of Clinical Neurology, [facility]  [facility].   Phone: [phone]  Fax: [phone]  E-mail: [email or web address]

## 2025-06-10 ENCOUNTER — NON-PROVIDER VISIT (OUTPATIENT)
Dept: NEUROLOGY | Facility: MEDICAL CENTER | Age: 80
End: 2025-06-10
Attending: STUDENT IN AN ORGANIZED HEALTH CARE EDUCATION/TRAINING PROGRAM
Payer: MEDICARE

## 2025-06-10 DIAGNOSIS — R41.3 MEMORY LOSS: ICD-10-CM

## 2025-06-10 DIAGNOSIS — F03.B0 MODERATE DEMENTIA WITHOUT BEHAVIORAL DISTURBANCE, PSYCHOTIC DISTURBANCE, MOOD DISTURBANCE, OR ANXIETY, UNSPECIFIED DEMENTIA TYPE (HCC): Primary | ICD-10-CM

## 2025-06-10 PROCEDURE — 95719 EEG PHYS/QHP EA INCR W/O VID: CPT | Performed by: STUDENT IN AN ORGANIZED HEALTH CARE EDUCATION/TRAINING PROGRAM

## 2025-06-10 PROCEDURE — 95708 EEG WO VID EA 12-26HR UNMNTR: CPT | Performed by: STUDENT IN AN ORGANIZED HEALTH CARE EDUCATION/TRAINING PROGRAM

## 2025-06-10 NOTE — NON-PROVIDER
Pt with mild breakdown. Mainly in all frontal leads - redness throughout others. All EEG electrodes moved 0.5cm posterior.

## 2025-06-10 NOTE — PROCEDURES
(No note.)   brief awakenings/arousals or abnormal/excessive movements in sleep.  Furthermore, there is very little to absent non-REM slow-wave sleep and little to no REM sleep.     ICTAL AND INTERICTAL FINDINGS:   No focal or generalized epileptiform activity noted.     No regional slowing or persistent focal asymmetries were seen.    No seizures.    ACTIVATION PROCEDURES:   NA    EKG: Sampling of the EKG recording showed sinus rhythm    EVENTS:    No clinical events were captured or reported.    INTERPRETATION:  Abnormal ambulatory EEG recording in the awake, drowsy, and sleep state(s):  Mild to moderate background slowing, with frequent diffuse rhythmic background slowing at 3.5 to 4.5 Hz (GRDA), suggestive of diffuse/multifocal cerebral dysfunction of a nonspecific etiology.  Common causes include toxic/metabolic encephalopathy, neurodegenerative disease, diffuse white matter damage which itself can arise from various causes, and/or nick-ictal or postictal phenomenon,  name a few.  Clinical and radiographic correlation recommended  No persistent focal asymmetries seen.  No definitive epileptiform discharges were seen.  No clinical events reported  Again seen were frequent sleep disturbances throughout the night, either in the form of brief awakenings/arousals or abnormal/excessive movements in sleep.  Furthermore, abnormally low amount of non-REM slow-wave sleep as well as REM sleep was observed.  Clinical correlation advised  Compared to the routine EEG done on 11/27/2024, diffuse background slowing is now seen  Compared to the previous 24 hours, no major differences were seen            Christofer Molina MD  Department of Neurology at [facility]  General Neurologist and Epileptologist  Director of [facility]'s Level III Comprehensive Epilepsy Program  Professor of Clinical Neurology, [facility].   Phone: [phone]  Fax: [phone]  E-mail: [email or web address]

## 2025-06-11 ENCOUNTER — NON-PROVIDER VISIT (OUTPATIENT)
Dept: NEUROLOGY | Facility: MEDICAL CENTER | Age: 80
End: 2025-06-11
Attending: STUDENT IN AN ORGANIZED HEALTH CARE EDUCATION/TRAINING PROGRAM
Payer: MEDICARE

## 2025-06-11 DIAGNOSIS — R41.3 MEMORY LOSS: ICD-10-CM

## 2025-06-11 DIAGNOSIS — F03.B0 MODERATE DEMENTIA WITHOUT BEHAVIORAL DISTURBANCE, PSYCHOTIC DISTURBANCE, MOOD DISTURBANCE, OR ANXIETY, UNSPECIFIED DEMENTIA TYPE (HCC): Primary | ICD-10-CM

## 2025-06-11 DIAGNOSIS — F01.B0 MODERATE MIXED CORTICAL AND SUBCORTICAL VASCULAR DEMENTIA WITHOUT BEHAVIORAL DISTURBANCE, PSYCHOTIC DISTURBANCE, MOOD DISTURBANCE, OR ANXIETY (HCC): ICD-10-CM

## 2025-06-11 PROCEDURE — 95719 EEG PHYS/QHP EA INCR W/O VID: CPT | Performed by: STUDENT IN AN ORGANIZED HEALTH CARE EDUCATION/TRAINING PROGRAM

## 2025-06-11 PROCEDURE — 95708 EEG WO VID EA 12-26HR UNMNTR: CPT | Performed by: STUDENT IN AN ORGANIZED HEALTH CARE EDUCATION/TRAINING PROGRAM

## 2025-06-11 NOTE — PROCEDURES
(No note.)   or persistent focal asymmetries were seen.    No seizures.    ACTIVATION PROCEDURES:   NA    EKG: Sampling of the EKG recording showed sinus rhythm    EVENTS:    No clinical events were captured or reported.    INTERPRETATION:  Abnormal ambulatory EEG recording in the awake, drowsy, and sleep state(s):  Mild to moderate background slowing, with frequent diffuse rhythmic background slowing at 3 to 5 Hz (GRDA), suggestive of diffuse/multifocal cerebral dysfunction of a nonspecific etiology.  Common causes include toxic/metabolic encephalopathy, neurodegenerative disease, diffuse white matter damage which itself can arise from various causes, and/or nick-ictal or postictal phenomenon,  name a few.  Clinical and radiographic correlation recommended  No persistent focal asymmetries seen.  No definitive epileptiform discharges were seen.  No clinical events reported  Compared to the routine EEG done on 11/27/2024, diffuse background slowing is now seen  Compared to the previous 48 hours, some differences were noted:   Sleep disturbances were less frequent  the quantity of non-REM slow-wave sleep and REM sleep were more proportionate and typical   there was very mild improvement in the background, especially in the last 3 hours of the study, due to faster theta/alpha frequencies of the background and a faster posterior dominant rhythm.  Clinical correlation advised            Christofer Molina MD  Department of Neurology at [facility]  General Neurologist and Epileptologist  Director of [facility]'s Level III Comprehensive Epilepsy Program  Professor of Clinical Neurology, [facility] of [facility].   Phone: [phone]  Fax: [phone]  E-mail: [email or web address]

## 2025-06-12 ENCOUNTER — NON-PROVIDER VISIT (OUTPATIENT)
Dept: NEUROLOGY | Facility: MEDICAL CENTER | Age: 80
End: 2025-06-12
Attending: STUDENT IN AN ORGANIZED HEALTH CARE EDUCATION/TRAINING PROGRAM
Payer: MEDICARE

## 2025-06-26 ENCOUNTER — APPOINTMENT (OUTPATIENT)
Facility: MEDICAL CENTER | Age: 80
End: 2025-06-26
Payer: MEDICARE

## 2025-06-26 LAB
ALBUMIN SERPL BCP-MCNC: 4.4 G/DL (ref 3.2–4.9)
ALBUMIN/GLOB SERPL: 1.8 G/DL
ALP SERPL-CCNC: 39 U/L (ref 30–99)
ALT SERPL-CCNC: 29 U/L (ref 2–50)
ANION GAP SERPL CALC-SCNC: 12 MMOL/L (ref 7–16)
AST SERPL-CCNC: 23 U/L (ref 12–45)
BILIRUB SERPL-MCNC: 1.1 MG/DL (ref 0.1–1.5)
BUN SERPL-MCNC: 30 MG/DL (ref 8–22)
CALCIUM ALBUM COR SERPL-MCNC: 9.4 MG/DL (ref 8.5–10.5)
CALCIUM SERPL-MCNC: 9.7 MG/DL (ref 8.5–10.5)
CHLORIDE SERPL-SCNC: 105 MMOL/L (ref 96–112)
CHOLEST SERPL-MCNC: 127 MG/DL (ref 100–199)
CO2 SERPL-SCNC: 23 MMOL/L (ref 20–33)
CREAT SERPL-MCNC: 1.8 MG/DL (ref 0.5–1.4)
FASTING STATUS PATIENT QL REPORTED: NORMAL
GFR SERPLBLD CREATININE-BSD FMLA CKD-EPI: 37 ML/MIN/1.73 M 2
GLOBULIN SER CALC-MCNC: 2.5 G/DL (ref 1.9–3.5)
GLUCOSE SERPL-MCNC: 92 MG/DL (ref 65–99)
HDLC SERPL-MCNC: 33 MG/DL
LDLC SERPL CALC-MCNC: 71 MG/DL
POTASSIUM SERPL-SCNC: 4.6 MMOL/L (ref 3.6–5.5)
PROT SERPL-MCNC: 6.9 G/DL (ref 6–8.2)
SODIUM SERPL-SCNC: 140 MMOL/L (ref 135–145)
TRIGL SERPL-MCNC: 115 MG/DL (ref 0–149)
VIT B12 SERPL-MCNC: 1511 PG/ML (ref 211–911)

## 2025-06-26 PROCEDURE — 80053 COMPREHEN METABOLIC PANEL: CPT

## 2025-06-26 PROCEDURE — 82607 VITAMIN B-12: CPT

## 2025-06-26 PROCEDURE — 80061 LIPID PANEL: CPT

## 2025-06-26 PROCEDURE — 36415 COLL VENOUS BLD VENIPUNCTURE: CPT

## 2025-07-14 ENCOUNTER — OFFICE VISIT (OUTPATIENT)
Dept: CARDIOLOGY | Facility: MEDICAL CENTER | Age: 80
End: 2025-07-14
Attending: INTERNAL MEDICINE
Payer: MEDICARE

## 2025-07-14 VITALS
BODY MASS INDEX: 28.73 KG/M2 | OXYGEN SATURATION: 97 % | DIASTOLIC BLOOD PRESSURE: 60 MMHG | WEIGHT: 194 LBS | RESPIRATION RATE: 14 BRPM | HEIGHT: 69 IN | SYSTOLIC BLOOD PRESSURE: 110 MMHG | HEART RATE: 77 BPM

## 2025-07-14 DIAGNOSIS — I48.0 PAROXYSMAL ATRIAL FIBRILLATION (HCC): Primary | ICD-10-CM

## 2025-07-14 DIAGNOSIS — I34.0 NONRHEUMATIC MITRAL VALVE REGURGITATION: ICD-10-CM

## 2025-07-14 DIAGNOSIS — E78.5 DYSLIPIDEMIA: ICD-10-CM

## 2025-07-14 DIAGNOSIS — I48.92 PAROXYSMAL ATRIAL FLUTTER (HCC): ICD-10-CM

## 2025-07-14 DIAGNOSIS — D68.69 SECONDARY HYPERCOAGULABLE STATE (HCC): ICD-10-CM

## 2025-07-14 PROBLEM — N18.32 CHRONIC KIDNEY DISEASE, STAGE 3B: Status: ACTIVE | Noted: 2025-07-14

## 2025-07-14 PROCEDURE — 99212 OFFICE O/P EST SF 10 MIN: CPT | Performed by: INTERNAL MEDICINE

## 2025-07-14 PROCEDURE — 99214 OFFICE O/P EST MOD 30 MIN: CPT | Performed by: INTERNAL MEDICINE

## 2025-07-14 PROCEDURE — 3078F DIAST BP <80 MM HG: CPT | Performed by: INTERNAL MEDICINE

## 2025-07-14 PROCEDURE — 3074F SYST BP LT 130 MM HG: CPT | Performed by: INTERNAL MEDICINE

## 2025-07-14 PROCEDURE — G2211 COMPLEX E/M VISIT ADD ON: HCPCS | Performed by: INTERNAL MEDICINE

## 2025-07-14 RX ORDER — AMIODARONE HYDROCHLORIDE 200 MG/1
200 TABLET ORAL DAILY
Qty: 100 TABLET | Refills: 3 | Status: SHIPPED | OUTPATIENT
Start: 2025-07-14

## 2025-07-14 ASSESSMENT — FIBROSIS 4 INDEX: FIB4 SCORE: 2.05

## 2025-07-14 NOTE — PROGRESS NOTES
Cardiology Follow Up Consultation Note    Date of note:   7/14/2025  Primary Care Provider: Leticia Wilkerson M.D.  Referring Provider: No ref. provider found    Patient Name: Hardy Roca   YOB: 1945  MRN:              [id number]    No chief complaint on file.      History of Present Illness: Mr. 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    Patient was found to have superficial siderosis and given his history of intraparenchymal hemorrhage, neurology deemed him at high risk for recurrent bleeding so OAC was discontinued altogether.  Patient underwent Watchman device implant in January 2025.  Post watchman implant SAMARA shows that the device is well-seated without evidence of peridevice leak.  Given high risk for bleeding, he was recommended by structural cardiology that aspirin be discontinued as well.  Not currently on any antiplatelet or anticoagulation therapy    He presents to the office with his wife for cardiac follow-up.  He has been doing well since his last follow-up visit.  He has been taking amiodarone as prescribed.  No recurrence of atrial fibrillation.  He denies having chest pain or dyspnea.    Cardiovascular Risk Factors:  1. Smoking status: Denies  2. Type II Diabetes Mellitus: Denies   Lab Results   Component Value Date/Time    HBA1C 4.9 12/20/2024 09:40 AM    HBA1C 5.5 07/01/2024 08:41 AM     3. Hypertension: Denies  4. Dyslipidemia: Yes   Cholesterol,Tot   Date Value Ref Range Status   12/20/2024 125 100 - 199 mg/dL Final     LDL   Date Value Ref Range Status   12/20/2024 72 <100 mg/dL Final     HDL   Date Value Ref Range Status    12/20/2024 32 (A) >=40 mg/dL Final     Triglycerides   Date Value Ref Range Status   12/20/2024 103 0 - 149 mg/dL Final     5. Family history of early Coronary Artery Disease in a first degree relative (Male less than 55 years of age; Female less than 65 years of age): Denies      Review of Systems:  As per HPI.  Review of systems assessed and are negative except as stated above.      Past Medical History:   Diagnosis Date    Atrial fibrillation (HCC) 2024    Atrial flutter (HCC)     BPH (benign prostatic hypertrophy)     CATARACT 2006, 2007    both done, one at a time    Hemorrhagic disorder (HCC) 10/2024    brain bleed    High cholesterol     HLD (hyperlipidemia)     Hypertension     no medication    Thalassemia     Unspecified urinary incontinence     prostate enlargement, per patient         Past Surgical History:   Procedure Laterality Date    RECOVERY  11/23/2015    Procedure: CATH LAB-CARDIOVERSION-KOZTOWSKI-ANESTHESIA-ICD 10: I48.91;  Surgeon: Recoveryonly Surgery;  Location: SURGERY PRE-POST PROC UNIT [facility] [place];  Service:     APPENDECTOMY  2010    INGUINAL HERNIA REPAIR  10/28/2009    Performed by ANGELI CHOI at SURGERY [facility]    RETINAL DETACHMENT REPAIR Right 1998    OTHER  01/01/1982    tonsillectomy    CATARACT EXTRACTION WITH IOL      bilateral 2006.2007         Current Outpatient Medications   Medication Sig Dispense Refill    amiodarone (CORDARONE) 200 MG Tab Take 1 Tablet by mouth every day. Indications: Atrial Fibrillation 100 Tablet 3    therapeutic multivitamin-minerals (THERAGRAN-M) Tab Take 1 Tablet by mouth every day.      Cyanocobalamin (B-12) 1000 MCG Tab Take  by mouth every day.      Ascorbic Acid (VITAMIN C) 1000 MG Tab Take  by mouth every day.      Vitamin D, Cholecalciferol, (CHOLECALCIFEROL) 25 MCG (1000 UT) Tab Take 1,000 Units by mouth every day.      escitalopram (LEXAPRO) 10 MG Tab Take 10 mg by mouth every day. Indications: Generalized Anxiety Disorder      Multiple  "Vitamins-Minerals (OCUVITE PO) Take 1 Capsule by mouth every day. Indications: Nutritional supplement      terazosin (HYTRIN) 10 MG capsule Take 10 mg by mouth every evening. Indications: Benign Enlargement of Prostate      simvastatin (ZOCOR) 20 MG Tab Take 1 Tab by mouth every evening. 30 Tab 11     No current facility-administered medications for this visit.         No Known Allergies      Family History   Problem Relation Age of Onset    Heart Disease Neg Hx     Heart Failure Neg Hx          Social History     Socioeconomic History    Marital status:      Spouse name: Not on file    Number of children: Not on file    Years of education: Not on file    Highest education level: Not on file   Occupational History    Not on file   Tobacco Use    Smoking status: Never    Smokeless tobacco: Never   Vaping Use    Vaping status: Never Used   Substance and Sexual Activity    Alcohol use: Not Currently     Comment: rarely    Drug use: No    Sexual activity: Not on file   Other Topics Concern    Not on file   Social History Narrative    Not on file     Social Drivers of Health     Financial Resource Strain: Not on file   Food Insecurity: Not on file   Transportation Needs: Not on file   Physical Activity: Not on file   Stress: Not on file   Social Connections: Feeling Socially Integrated (6/20/2024)    OASIS : Social Isolation     Frequency of experiencing loneliness or isolation: Never   Intimate Partner Violence: Not on file   Housing Stability: Not on file         Physical Exam:  Ambulatory Vitals  /60 (BP Location: Left arm, Patient Position: Sitting, BP Cuff Size: Adult)   Pulse 77   Resp 14   Ht 1.753 m (5' 9\")   Wt 88 kg (194 lb)   SpO2 97%    Oxygen Therapy:  Pulse Oximetry: 97 %  BP Readings from Last 4 Encounters:   07/14/25 110/60   05/30/25 100/56   05/05/25 102/56   04/01/25 100/58       Weight/BMI: Body mass index is 28.65 kg/m².  Wt Readings from Last 4 Encounters:   07/14/25 88 kg " (194 lb)   05/30/25 89.4 kg (197 lb 1.5 oz)   05/05/25 89.4 kg (197 lb)   04/01/25 90.7 kg (200 lb)         General: Not in acute distress, appears comfortable  HEENT: OP clear   Neck:  No carotid bruits, No JVD appreciated  CVS:  RRR, Normal S1, S2. No murmurs, rubs or gallops  Resp: Normal respiratory effort, lungs CTA bilaterally.  Abdomen: Soft, non-distended  Neurological: Moves all extremities, no focal neurologic deficits  Extremities:   Extremities warm, 2+ bilateral radial pulses.  2+ bilateral dp pulses, no lower extremity edema bilaterally      Lab Data Review:  Lab Results   Component Value Date/Time    CHOLSTRLTOT 127 06/26/2025 10:09 AM    LDL 71 06/26/2025 10:09 AM    HDL 33 (A) 06/26/2025 10:09 AM    TRIGLYCERIDE 115 06/26/2025 10:09 AM       Lab Results   Component Value Date/Time    SODIUM 140 06/26/2025 10:09 AM    POTASSIUM 4.6 06/26/2025 10:09 AM    CHLORIDE 105 06/26/2025 10:09 AM    CO2 23 06/26/2025 10:09 AM    GLUCOSE 92 06/26/2025 10:09 AM    BUN 30 (H) 06/26/2025 10:09 AM    CREATININE 1.80 (H) 06/26/2025 10:09 AM     Lab Results   Component Value Date/Time    ALKPHOSPHAT 39 06/26/2025 10:09 AM    ASTSGOT 23 06/26/2025 10:09 AM    ALTSGPT 29 06/26/2025 10:09 AM    TBILIRUBIN 1.1 06/26/2025 10:09 AM      Lab Results   Component Value Date/Time    WBC 9.4 04/24/2025 10:16 AM    HEMOGLOBIN 11.1 (L) 04/24/2025 10:16 AM     Lab Results   Component Value Date/Time    HBA1C 4.9 12/20/2024 09:40 AM    HBA1C 5.5 07/01/2024 08:41 AM         Cardiac Imaging and Procedures Review:    EKG dated 1/7/2025:   My personal interpretation is sinus rhythm    Echo dated 4/17/2024:   Compared to the images of the prior study 02/27/2019 - the estimate of   right ventricular systolic pressure is increased, previously normal.  Normal left ventricular systolic function.  The ejection fraction is measured to be 72 % by Cuenca's biplane.  Normal diastolic function.  The right ventricle is normal in size and systolic  function.  Mild aortic insufficiency.  Trace tricuspid regurgitation.  Right ventricular systolic pressure is estimated to be 40 mmHg.        Assessment & Plan     1. Paroxysmal atrial fibrillation (HCC)        2. Paroxysmal atrial flutter (HCC)  amiodarone (CORDARONE) 200 MG Tab      3. Secondary hypercoagulable state (HCC)        4. Dyslipidemia        5. Nonrheumatic mitral valve regurgitation              Medical Decision Making:  Mr. 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    1. Paroxysmal atrial fibrillation (HCC)  2. Paroxysmal atrial flutter (HCC)  - Remains in sinus rhythm.  Continue amiodarone 200 mg daily for rhythm control strategy.  - Not currently on anticoagulation given history of intraparenchymal hemorrhage.  Status post watchman implant which is well-seated without evidence of PIYUSH device leak.  Not on antiplatelet therapy given high risk of bleeding.    3. Secondary hypercoagulable state (HCC)  - Status post watchman implant    4. Dyslipidemia  -Continue simvastatin 20 mg daily.    5. Mitral regurgitation  - Mild to moderate mitral regurgitation on SAMARA.  Repeat surface echocardiogram in 2 years.    () Today's E/M visit is associated with medical care services that serve as the continuing focal point for all needed health care services and/or with medical care services that  are part of ongoing care related to a patient's single, serious condition, or a complex condition: This includes  furnishing services to patients on an ongoing basis that result in care that is personalized  to the patient. The services result in a comprehensive, longitudinal, and  continuous  relationship with the patient and involve delivery of team-based care that is accessible, coordinated with other practitioners and providers, and integrated with the broader health care landscape.     It was a pleasure seeing . Hardy Roca in the office today. Return in about 6 months (around 1/14/2026) for Atrial fibrillation. Patient is aware to call the cardiology clinic with any questions or concerns.      Taye Eid MD, [facility]  Cardiologist, [facility], [facility]  [address] [address]  Phone: [phone]  Fax: [phone]    Please note that this dictation was created using voice recognition software. I have made every reasonable attempt to correct obvious errors, but it is possible there are errors of grammar and possibly content that I did not discover before finalizing the note.

## 2025-08-21 DIAGNOSIS — J63.4 SUPERFICIAL SIDEROSIS PRESENT ON MAGNETIC RESONANCE IMAGING (HCC): ICD-10-CM

## 2025-08-21 DIAGNOSIS — F01.B0 MODERATE MIXED CORTICAL AND SUBCORTICAL VASCULAR DEMENTIA WITHOUT BEHAVIORAL DISTURBANCE, PSYCHOTIC DISTURBANCE, MOOD DISTURBANCE, OR ANXIETY (HCC): Primary | ICD-10-CM

## 2025-08-21 RX ORDER — LAMOTRIGINE 50 MG/1
50 TABLET, EXTENDED RELEASE ORAL DAILY
Qty: 30 TABLET | Refills: 4 | Status: SHIPPED | OUTPATIENT
Start: 2025-08-21

## 2025-10-21 ENCOUNTER — APPOINTMENT (OUTPATIENT)
Facility: MEDICAL CENTER | Age: 80
End: 2025-10-21
Payer: MEDICARE